# Patient Record
Sex: FEMALE | Race: BLACK OR AFRICAN AMERICAN | Employment: UNEMPLOYED | URBAN - METROPOLITAN AREA
[De-identification: names, ages, dates, MRNs, and addresses within clinical notes are randomized per-mention and may not be internally consistent; named-entity substitution may affect disease eponyms.]

---

## 2017-07-11 ENCOUNTER — HOSPITAL ENCOUNTER (OUTPATIENT)
Dept: MAMMOGRAPHY | Age: 67
Discharge: HOME | End: 2017-07-11
Attending: INTERNAL MEDICINE
Payer: MEDICARE

## 2017-07-11 DIAGNOSIS — Z12.31 VISIT FOR SCREENING MAMMOGRAM: ICD-10-CM

## 2017-07-11 PROCEDURE — 77067 SCR MAMMO BI INCL CAD: CPT

## 2018-07-12 ENCOUNTER — HOSPITAL ENCOUNTER (OUTPATIENT)
Dept: MAMMOGRAPHY | Age: 68
Discharge: HOME | End: 2018-07-12
Attending: INTERNAL MEDICINE
Payer: MEDICARE

## 2018-07-12 DIAGNOSIS — Z12.39 SCREENING BREAST EXAMINATION: ICD-10-CM

## 2018-07-12 PROCEDURE — 77067 SCR MAMMO BI INCL CAD: CPT

## 2018-09-21 ENCOUNTER — HOSPITAL ENCOUNTER (OUTPATIENT)
Dept: PREADMISSION TESTING | Age: 68
Discharge: HOME | End: 2018-09-21
Payer: MEDICARE

## 2018-09-21 ENCOUNTER — ANESTHESIA EVENT (OUTPATIENT)
Dept: SURGERY | Age: 68
End: 2018-09-21
Payer: MEDICARE

## 2018-09-21 VITALS
TEMPERATURE: 98.2 F | OXYGEN SATURATION: 100 % | RESPIRATION RATE: 16 BRPM | HEART RATE: 71 BPM | BODY MASS INDEX: 30.3 KG/M2 | DIASTOLIC BLOOD PRESSURE: 89 MMHG | HEIGHT: 57 IN | WEIGHT: 140.43 LBS | SYSTOLIC BLOOD PRESSURE: 127 MMHG

## 2018-09-21 LAB
ANION GAP SERPL CALC-SCNC: 7 MMOL/L (ref 5–15)
ATRIAL RATE: 62 BPM
BUN SERPL-MCNC: 26 MG/DL (ref 6–20)
BUN/CREAT SERPL: 17 (ref 12–20)
CALCIUM SERPL-MCNC: 9 MG/DL (ref 8.5–10.1)
CALCULATED P AXIS, ECG09: 42 DEGREES
CALCULATED R AXIS, ECG10: 18 DEGREES
CALCULATED T AXIS, ECG11: 38 DEGREES
CHLORIDE SERPL-SCNC: 102 MMOL/L (ref 97–108)
CO2 SERPL-SCNC: 30 MMOL/L (ref 21–32)
CREAT SERPL-MCNC: 1.49 MG/DL (ref 0.55–1.02)
DIAGNOSIS, 93000: NORMAL
ERYTHROCYTE [DISTWIDTH] IN BLOOD BY AUTOMATED COUNT: 12.8 % (ref 11.5–14.5)
GLUCOSE SERPL-MCNC: 99 MG/DL (ref 65–100)
HCT VFR BLD AUTO: 36.2 % (ref 35–47)
HGB BLD-MCNC: 11.4 G/DL (ref 11.5–16)
MCH RBC QN AUTO: 27.5 PG (ref 26–34)
MCHC RBC AUTO-ENTMCNC: 31.5 G/DL (ref 30–36.5)
MCV RBC AUTO: 87.4 FL (ref 80–99)
NRBC # BLD: 0 K/UL (ref 0–0.01)
NRBC BLD-RTO: 0 PER 100 WBC
P-R INTERVAL, ECG05: 124 MS
PLATELET # BLD AUTO: 310 K/UL (ref 150–400)
PMV BLD AUTO: 9.7 FL (ref 8.9–12.9)
POTASSIUM SERPL-SCNC: 4.4 MMOL/L (ref 3.5–5.1)
Q-T INTERVAL, ECG07: 422 MS
QRS DURATION, ECG06: 66 MS
QTC CALCULATION (BEZET), ECG08: 428 MS
RBC # BLD AUTO: 4.14 M/UL (ref 3.8–5.2)
SODIUM SERPL-SCNC: 139 MMOL/L (ref 136–145)
VENTRICULAR RATE, ECG03: 62 BPM
WBC # BLD AUTO: 6.6 K/UL (ref 3.6–11)

## 2018-09-21 PROCEDURE — 80048 BASIC METABOLIC PNL TOTAL CA: CPT | Performed by: PODIATRIST

## 2018-09-21 PROCEDURE — 36415 COLL VENOUS BLD VENIPUNCTURE: CPT | Performed by: PODIATRIST

## 2018-09-21 PROCEDURE — 93005 ELECTROCARDIOGRAM TRACING: CPT

## 2018-09-21 PROCEDURE — 85027 COMPLETE CBC AUTOMATED: CPT | Performed by: PODIATRIST

## 2018-09-21 RX ORDER — CEFAZOLIN SODIUM IN 0.9 % NACL 2 G/100 ML
2 PLASTIC BAG, INJECTION (ML) INTRAVENOUS
Status: CANCELLED | OUTPATIENT
Start: 2018-09-24 | End: 2018-09-25

## 2018-09-21 NOTE — PERIOP NOTES
T[address] 81  Preoperative Instructions      Surgery Date 09/24/2018               Time of Arrival  0830    1. On the day of your surgery, please report to the [facility]OSPITALS Entrance. If arriving prior to 7 AM please enter through the Emergency Room Entrance. 2. You must have a responsible adult to drive you to the hospital, stay at the hospital during your surgery and drive you home. You should have someone stay with you for the first 24 hours after your surgery. You should not drive a car for 24 hours following surgery. 3. Do not have anything to eat or drink ( including water, gum, mints, coffee, juice) after midnight . This may not apply to medications prescribed by your physician. Please note special instructions, if applicable. If you are currently taking Plavix, Coumadin, or other blood-thinning agents, contact your surgeon for instructions. 4. We recommend you do not drink any alcoholic beverages for 24 hours before and after your surgery. 5. Have a list of all current medications, including vitamins, herbal supplements and any other over the counter medications. Stop Asprin and non-steroidal anti-inflammatory drugs (I.e. Advil, Aleve) as directed by your surgeon's office. Stop all vitamins and herbal supplements seven days prior to your surgery. 6. Wear comfortable clothes. Wear glasses instead of contacts. Do not bring any money or jewelry. Do not wear make-up, particularly mascara, the morning of your surgery. Do not wear nail polish, particularly if you are having foot /hand surgery. Wear your hair loose or down, no ponytails, buns, jae pins or clips. All body piercings must be removed. Please shower before surgery with an antibacterial soap (Safeguard/Dial) and use a clean towel. Do not apply any lotions, powders, cologne, perfume or deodorants afterward.     Do not shave the area around your surgical incision for at least two to three days prior to your surgery. If you wear glasses, contacts, dentures and/or hearing aids, they will be removed prior to surgery. 7. You should understand that if you do not follow these instructions your surgery may be cancelled. If your physical condition changes (I.e. fever, cold or flu) please contact your surgeon as soon as possible. 8. It is important that you be on time. If a situation occurs where you may be late, please call [phone]    9. If you are feeling sick before surgery, call your surgeon. He or she will tell you what to do. If you are sick on the day of your surgery please call [phone].     10. If you have any questions and or problems, please call [phone] (Pre-admission Testing). 11. Your surgery time may be subject to change. You will receive a phone call the evening before your surgery if your time changes. Special Instructions: Bathe with antibacterial soap Sunday night and Monday morning    MEDICATIONS TO TAKE THE MORNING OF SURGERY WITH A SIP OF WATER: Amlodipine     I understand a pre-operative phone call will be made to verify my surgery time. In the event that I am not available, I give permission for a message to be left on my answering service and/or with another person?    yes           ___________________      ___________________  _________  (Signature of Patient)          (Witness)                              (Date and Time)

## 2018-09-21 NOTE — PROGRESS NOTES
ekg completed after instruction and transmitted, pt escorted to lobby for labs.  Call placed to Paul Ackerman to note pt needs labs hard copy  ekg to OR Nurse HARSH

## 2018-09-24 ENCOUNTER — HOSPITAL ENCOUNTER (OUTPATIENT)
Age: 68
Setting detail: OUTPATIENT SURGERY
Discharge: HOME | End: 2018-09-24
Attending: PODIATRIST | Admitting: PODIATRIST
Payer: MEDICARE

## 2018-09-24 ENCOUNTER — ANESTHESIA (OUTPATIENT)
Dept: SURGERY | Age: 68
End: 2018-09-24
Payer: MEDICARE

## 2018-09-24 VITALS
TEMPERATURE: 98.1 F | HEART RATE: 67 BPM | WEIGHT: 140.43 LBS | BODY MASS INDEX: 30.3 KG/M2 | DIASTOLIC BLOOD PRESSURE: 74 MMHG | RESPIRATION RATE: 15 BRPM | OXYGEN SATURATION: 99 % | HEIGHT: 57 IN | SYSTOLIC BLOOD PRESSURE: 125 MMHG

## 2018-09-24 DIAGNOSIS — M20.11 HALLUX VALGUS (ACQUIRED), RIGHT FOOT: Primary | ICD-10-CM

## 2018-09-24 PROCEDURE — 76060000033 HC ANESTHESIA 1 TO 1.5 HR: Performed by: PODIATRIST

## 2018-09-24 PROCEDURE — 88311 DECALCIFY TISSUE: CPT | Performed by: PODIATRIST

## 2018-09-24 PROCEDURE — 74011250636 HC RX REV CODE- 250/636: Performed by: ANESTHESIOLOGY

## 2018-09-24 PROCEDURE — 74011250636 HC RX REV CODE- 250/636

## 2018-09-24 PROCEDURE — 77030002933 HC SUT MCRYL J&J -A: Performed by: PODIATRIST

## 2018-09-24 PROCEDURE — 77030013921: Performed by: PODIATRIST

## 2018-09-24 PROCEDURE — 77030018836 HC SOL IRR NACL ICUM -A: Performed by: PODIATRIST

## 2018-09-24 PROCEDURE — 77030000032 HC CUF TRNQT ZIMM -B: Performed by: PODIATRIST

## 2018-09-24 PROCEDURE — 74011000250 HC RX REV CODE- 250: Performed by: PODIATRIST

## 2018-09-24 PROCEDURE — 76210000063 HC OR PH I REC FIRST 0.5 HR: Performed by: PODIATRIST

## 2018-09-24 PROCEDURE — 88304 TISSUE EXAM BY PATHOLOGIST: CPT | Performed by: PODIATRIST

## 2018-09-24 PROCEDURE — 76010000149 HC OR TIME 1 TO 1.5 HR: Performed by: PODIATRIST

## 2018-09-24 PROCEDURE — 77030031139 HC SUT VCRL2 J&J -A: Performed by: PODIATRIST

## 2018-09-24 PROCEDURE — 74011000250 HC RX REV CODE- 250

## 2018-09-24 PROCEDURE — 77030006788 HC BLD SAW OSC STRY -B: Performed by: PODIATRIST

## 2018-09-24 PROCEDURE — 77030011640 HC PAD GRND REM COVD -A: Performed by: PODIATRIST

## 2018-09-24 PROCEDURE — 74011250636 HC RX REV CODE- 250/636: Performed by: PODIATRIST

## 2018-09-24 PROCEDURE — 76210000020 HC REC RM PH II FIRST 0.5 HR: Performed by: PODIATRIST

## 2018-09-24 RX ORDER — LIDOCAINE HYDROCHLORIDE 10 MG/ML
20 INJECTION, SOLUTION EPIDURAL; INFILTRATION; INTRACAUDAL; PERINEURAL ONCE
Status: COMPLETED | OUTPATIENT
Start: 2018-09-24 | End: 2018-09-24

## 2018-09-24 RX ORDER — SODIUM CHLORIDE 0.9 % (FLUSH) 0.9 %
5-10 SYRINGE (ML) INJECTION AS NEEDED
Status: DISCONTINUED | OUTPATIENT
Start: 2018-09-24 | End: 2018-09-24 | Stop reason: HOSPADM

## 2018-09-24 RX ORDER — PROPOFOL 10 MG/ML
INJECTION, EMULSION INTRAVENOUS
Status: DISCONTINUED | OUTPATIENT
Start: 2018-09-24 | End: 2018-09-24 | Stop reason: HOSPADM

## 2018-09-24 RX ORDER — BUPIVACAINE HYDROCHLORIDE 5 MG/ML
30 INJECTION, SOLUTION EPIDURAL; INTRACAUDAL ONCE
Status: COMPLETED | OUTPATIENT
Start: 2018-09-24 | End: 2018-09-24

## 2018-09-24 RX ORDER — SODIUM CHLORIDE, SODIUM LACTATE, POTASSIUM CHLORIDE, CALCIUM CHLORIDE 600; 310; 30; 20 MG/100ML; MG/100ML; MG/100ML; MG/100ML
50 INJECTION, SOLUTION INTRAVENOUS CONTINUOUS
Status: DISCONTINUED | OUTPATIENT
Start: 2018-09-24 | End: 2018-09-24 | Stop reason: HOSPADM

## 2018-09-24 RX ORDER — IBUPROFEN 200 MG
600 TABLET ORAL
Qty: 90 TAB | Refills: 1 | Status: SHIPPED | OUTPATIENT
Start: 2018-09-24 | End: 2020-12-23 | Stop reason: SDUPTHER

## 2018-09-24 RX ORDER — FENTANYL CITRATE 50 UG/ML
25 INJECTION, SOLUTION INTRAMUSCULAR; INTRAVENOUS
Status: DISCONTINUED | OUTPATIENT
Start: 2018-09-24 | End: 2018-09-24 | Stop reason: HOSPADM

## 2018-09-24 RX ORDER — HYDROMORPHONE HYDROCHLORIDE 1 MG/ML
0.5 INJECTION, SOLUTION INTRAMUSCULAR; INTRAVENOUS; SUBCUTANEOUS
Status: DISCONTINUED | OUTPATIENT
Start: 2018-09-24 | End: 2018-09-24 | Stop reason: HOSPADM

## 2018-09-24 RX ORDER — FENTANYL CITRATE 50 UG/ML
INJECTION, SOLUTION INTRAMUSCULAR; INTRAVENOUS AS NEEDED
Status: DISCONTINUED | OUTPATIENT
Start: 2018-09-24 | End: 2018-09-24 | Stop reason: HOSPADM

## 2018-09-24 RX ORDER — MIDAZOLAM HYDROCHLORIDE 1 MG/ML
INJECTION, SOLUTION INTRAMUSCULAR; INTRAVENOUS AS NEEDED
Status: DISCONTINUED | OUTPATIENT
Start: 2018-09-24 | End: 2018-09-24 | Stop reason: HOSPADM

## 2018-09-24 RX ORDER — CEFAZOLIN SODIUM IN 0.9 % NACL 2 G/100 ML
PLASTIC BAG, INJECTION (ML) INTRAVENOUS
Status: COMPLETED
Start: 2018-09-24 | End: 2018-09-24

## 2018-09-24 RX ORDER — SODIUM CHLORIDE 0.9 % (FLUSH) 0.9 %
5-10 SYRINGE (ML) INJECTION EVERY 8 HOURS
Status: DISCONTINUED | OUTPATIENT
Start: 2018-09-24 | End: 2018-09-24 | Stop reason: HOSPADM

## 2018-09-24 RX ORDER — SODIUM CHLORIDE 9 MG/ML
50 INJECTION, SOLUTION INTRAVENOUS CONTINUOUS
Status: DISCONTINUED | OUTPATIENT
Start: 2018-09-24 | End: 2018-09-24 | Stop reason: HOSPADM

## 2018-09-24 RX ORDER — OXYCODONE AND ACETAMINOPHEN 5; 325 MG/1; MG/1
1 TABLET ORAL
Qty: 30 TAB | Refills: 0 | Status: SHIPPED | OUTPATIENT
Start: 2018-09-24 | End: 2020-12-23 | Stop reason: ALTCHOICE

## 2018-09-24 RX ORDER — LIDOCAINE HYDROCHLORIDE 10 MG/ML
0.1 INJECTION, SOLUTION EPIDURAL; INFILTRATION; INTRACAUDAL; PERINEURAL AS NEEDED
Status: DISCONTINUED | OUTPATIENT
Start: 2018-09-24 | End: 2018-09-24 | Stop reason: HOSPADM

## 2018-09-24 RX ORDER — LIDOCAINE HYDROCHLORIDE 10 MG/ML
INJECTION INFILTRATION; PERINEURAL
Status: DISCONTINUED
Start: 2018-09-24 | End: 2018-09-24 | Stop reason: HOSPADM

## 2018-09-24 RX ORDER — EPHEDRINE SULFATE 50 MG/ML
INJECTION, SOLUTION INTRAVENOUS AS NEEDED
Status: DISCONTINUED | OUTPATIENT
Start: 2018-09-24 | End: 2018-09-24 | Stop reason: HOSPADM

## 2018-09-24 RX ORDER — CEFAZOLIN SODIUM IN 0.9 % NACL 2 G/100 ML
PLASTIC BAG, INJECTION (ML) INTRAVENOUS AS NEEDED
Status: DISCONTINUED | OUTPATIENT
Start: 2018-09-24 | End: 2018-09-24 | Stop reason: HOSPADM

## 2018-09-24 RX ADMIN — MIDAZOLAM HYDROCHLORIDE 2 MG: 1 INJECTION, SOLUTION INTRAMUSCULAR; INTRAVENOUS at 11:52

## 2018-09-24 RX ADMIN — FENTANYL CITRATE 50 MCG: 50 INJECTION, SOLUTION INTRAMUSCULAR; INTRAVENOUS at 11:57

## 2018-09-24 RX ADMIN — FENTANYL CITRATE 25 MCG: 50 INJECTION, SOLUTION INTRAMUSCULAR; INTRAVENOUS at 11:52

## 2018-09-24 RX ADMIN — PROPOFOL 50 MCG/KG/MIN: 10 INJECTION, EMULSION INTRAVENOUS at 11:57

## 2018-09-24 RX ADMIN — Medication 2 G: at 11:55

## 2018-09-24 RX ADMIN — FENTANYL CITRATE 25 MCG: 50 INJECTION, SOLUTION INTRAMUSCULAR; INTRAVENOUS at 11:48

## 2018-09-24 RX ADMIN — EPHEDRINE SULFATE 10 MG: 50 INJECTION, SOLUTION INTRAVENOUS at 12:03

## 2018-09-24 RX ADMIN — SODIUM CHLORIDE, POTASSIUM CHLORIDE, SODIUM LACTATE AND CALCIUM CHLORIDE: 600; 310; 30; 20 INJECTION, SOLUTION INTRAVENOUS at 09:22

## 2018-09-24 NOTE — H&P
History and Physical    Subjective:     Zechariah Arias is a 69 y.o.  female who presents with painful right bunion of several months. . Onset of symptoms was gradual with gradually worsening course since that time. The pain is located 1st right bunion. Patient describes the pain as continuous and rated as moderate. Pain has been associated with walking and wearing shoes. Patient denies any type of truama. Symptoms are aggravated by shoes and walking. Previous studies include xray. Past Medical History:   Diagnosis Date    Arthritis     GERD (gastroesophageal reflux disease)     Hypertension       Past Surgical History:   Procedure Laterality Date    ABDOMEN SURGERY PROC UNLISTED      gallbladder    HX BREAST BIOPSY Left     Stereo Bx Yrs ago - BENIGN    HX OTHER SURGICAL Left     bunionectomy     History reviewed. No pertinent family history. Social History   Substance Use Topics    Smoking status: Former Smoker    Smokeless tobacco: Never Used    Alcohol use No       Prior to Admission medications    Medication Sig Start Date End Date Taking? Authorizing Provider   ibuprofen (MOTRIN) 200 mg tablet Take 3 Tabs by mouth two (2) times a day. 8/1/16  Yes Raylene Cheadle, DPM   amLODIPine (NORVASC) 10 mg tablet Take 10 mg by mouth daily. Yes Historical Provider   lisinopril (PRINIVIL, ZESTRIL) 20 mg tablet Take 20 mg by mouth daily. Yes Historical Provider   potassium citrate (UROCIT-K10) 10 mEq (1,080 mg) TbER Take  by mouth. Historical Provider     No Known Allergies     Review of Systems:  A comprehensive review of systems was negative. Objective:      Intake and Output:            Physical Exam:   Visit Vitals    /76 (BP 1 Location: Left arm, BP Patient Position: At rest;Supine)    Pulse 71    Temp 98.6 °F (37 °C)    Resp 16    Ht 4' 9\" (1.448 m)    Wt 63.7 kg (140 lb 6.9 oz)    SpO2 99%    BMI 30.39 kg/m2     General:  Alert, cooperative, no distress, appears stated age. Head:  Normocephalic, without obvious abnormality, atraumatic. Eyes:  Conjunctivae/corneas clear. PERRL,    Ears:  Normal  external ear canals both ears. Nose: Nares normal. Septum midline. Mucosa normal. No drainage or sinus tenderness. Throat: Lips, mucosa, and tongue normal. Teeth and gums normal.   Neck: Supple, symmetrical, trachea midline, no adenopathy, thyroid: no enlargement/tenderness/nodules,    Back:   Symmetric, no curvature. ROM normal.    Lungs:   Clear to auscultation bilaterally. Chest wall:  No tenderness or deformity. Heart:  Regular rate and rhythm, S1, S2 normal, no murmur, click, rub or gallop. Extremities: Extremities normal, atraumatic, no cyanosis or edema. Pulses: 2+ and symmetric all extremities. Skin: Skin color, texture, turgor normal. No rashes or lesions. Lymph nodes: Cervical, supraclavicular, and axillary nodes normal.   Neurologic: CNII-XII intact. Normal strength, sensation and reflexes throughout. LOWER EXTREMITIES:  Palpable pedal pulses with epicritic sensation intact  Good muscle strength   Pain with ROM and medially 1st right MPJ    XRAY:  Minimal joint space narrowing 1st left MPJ; exostosis dorsal 1st met head  1st IM angle 12 degrees       Data Review:   No results found for this or any previous visit (from the past 24 hour(s)). Assessment:     Active Problems:    Hallux valgus (acquired), right foot (9/24/2018)        Plan:   Scheduled for bunionectomy  with possible implant 1st right MPJ. Discuss risks, benefits, expected outcome as well as post op course.       Signed By: Corrine Irby DPM     September 24, 2018

## 2018-09-24 NOTE — BRIEF OP NOTE
BRIEF OPERATIVE NOTE    Date of Procedure: 9/24/2018   Preoperative Diagnosis: HALLUX RIGIDUS RIGHT FOOT  Postoperative Diagnosis: HALLUX RIGIDUS RIGHT FOOT    Procedure(s):  Silver BUNIONECTOMY RIGHT FOOT  Surgeon(s) and Role:     * Lili Quintero DPM - Primary         Surgical Assistant: none    Surgical Staff:  [facility]  Scrub Tech-1: Cristal Edouard  Event Time In   Incision Start 1205   Incision Close 1249     Anesthesia: MAC   Estimated Blood Loss: none  Specimens:   ID Type Source Tests Collected by Time Destination   1 : Bone Right Foot Preservative Bone  Lili Quintero DPM 9/24/2018 1238 Pathology      Findings: arthritic bone  Complications: none  Implants: * No implants in log *

## 2018-09-24 NOTE — IP AVS SNAPSHOT
Mariia Buciod 
 
 
 [address] Patient: Zana Barboza MRN: [id number] :1950 About your hospitalization You were admitted on:  2018 You last received care in the:  [facility] PACU/[facility] You were discharged on:  2018 Why you were hospitalized Your primary diagnosis was:  Not on File Your diagnoses also included:  Hallux Valgus (Acquired), Right Foot Follow-up Information Follow up With Details Comments Contact Info MD [address] 
 
[facility] [phone] 
[phone] Discharge Orders Procedure Order Date Status Priority Quantity Spec Type Associated Dx SURGICAL BOOT 18 Normal Routine 1  Hallux valgus (acquired), right foot [[id number]] Comments:  Dispense surgical shoe right  foot WEIGHT BEARING: SPECIFY 18 Normal Routine 1  Hallux valgus (acquired), right foot [[id number]] Comments:  Do not walk, stand or bear weight without surgical shoe on right. DO NOT CHANGE OR GET DRESSING WET! A check heather indicates which time of day the medication should be taken. My Medications START taking these medications Instructions Each Dose to Equal  
 Morning Noon Evening Bedtime  
 oxyCODONE-acetaminophen 5-325 mg per tablet Commonly known as:  PERCOCET Your last dose was: Your next dose is: Take 1 Tab by mouth every four (4) hours as needed for Pain. Max Daily Amount: 6 Tabs. 1 Tab CHANGE how you take these medications Instructions Each Dose to Equal  
 Morning Noon Evening Bedtime * ibuprofen 200 mg tablet Commonly known as:  MOTRIN What changed:  Another medication with the same name was added. Make sure you understand how and when to take each. Your last dose was: Your next dose is: Take 3 Tabs by mouth two (2) times a day. 600 mg  
    
   
   
   
  
 * ibuprofen 200 mg tablet Commonly known as:  MOTRIN What changed: You were already taking a medication with the same name, and this prescription was added. Make sure you understand how and when to take each. Your last dose was: Your next dose is: Take 3 Tabs by mouth three (3) times daily as needed for Pain. 600 mg * Notice: This list has 2 medication(s) that are the same as other medications prescribed for you. Read the directions carefully, and ask your doctor or other care provider to review them with you. CONTINUE taking these medications Instructions Each Dose to Equal  
 Morning Noon Evening Bedtime  
 amLODIPine 10 mg tablet Commonly known as:  Dione Fraction Your last dose was: Your next dose is: Take 10 mg by mouth daily. 10 mg  
    
   
   
   
  
 lisinopril 20 mg tablet Commonly known as:  Larson Argyle Your last dose was: Your next dose is: Take 20 mg by mouth daily. 20 mg  
    
   
   
   
  
 potassium citrate 10 mEq (1,080 mg) Slade Arroyo Commonly known as:  [place] Your last dose was: Your next dose is: Take  by mouth. Where to Get Your Medications Information on where to get these meds will be given to you by the nurse or doctor. ! Ask your nurse or doctor about these medications  
  ibuprofen 200 mg tablet  
 oxyCODONE-acetaminophen 5-325 mg per tablet Opioid Education Prescription Opioids: What You Need to Know: 
 
Prescription opioids can be used to help relieve moderate-to-severe pain and are often prescribed following a surgery or injury, or for certain health conditions. These medications can be an important part of treatment but also come with serious risks.   Opioids are strong pain medicines. Examples include hydrocodone, oxycodone, fentanyl, and morphine. Heroin is an example of an illegal opioid. It is important to work with your health care provider to make sure you are getting the safest, most effective care. WHAT ARE THE RISKS AND SIDE EFFECTS OF OPIOID USE? Prescription opioids carry serious risks of addiction and overdose, especially with prolonged use. An opioid overdose, often marked by slow breathing, can cause sudden death. The use of prescription opioids can have a number of side effects as well, even when taken as directed. · Tolerance-meaning you might need to take more of a medication for the same pain relief · Physical dependence-meaning you have symptoms of withdrawal when the medication is stopped. Withdrawal symptoms can include nausea, sweating, chills, diarrhea, stomach cramps, and muscle aches. Withdrawal can last up to several weeks, depending on which drug you took and how long you took it. · Increased sensitivity to pain · Constipation · Nausea, vomiting, and dry mouth · Sleepiness and dizziness · Confusion · Depression · Low levels of testosterone that can result in lower sex drive, energy, and strength · Itching and sweating RISKS ARE GREATER WITH:      
· History of drug misuse, substance use disorder, or overdose · Mental health conditions (such as depression or anxiety) · Sleep apnea · Older age (72 years or older) · Pregnancy Avoid alcohol while taking prescription opioids. Also, unless specifically advised by your health care provider, medications to avoid include: · Benzodiazepines (such as Xanax or Valium) · Muscle relaxants (such as Soma or Flexeril) · Hypnotics (such as Ambien or Lunesta) · Other prescription opioids KNOW YOUR OPTIONS Talk to your health care provider about ways to manage your pain that don't involve prescription opioids.   Some of these options may actually work better and have fewer risks and side effects. Consult your physician before adding or stopping any medications, treatments, or physical activity. Options may include: 
· Pain relievers such as acetaminophen, ibuprofen, and naproxen · Some medications that are also used for depression or seizures · Physical therapy and exercise · Counseling to help patients learn how to cope better with triggers of pain and stress. · Application of heat or cold compress · Massage therapy · Relaxation techniques Be Informed Make sure you know the name of your medication, how much and how often to take it, and its potential risks & side effects. IF YOU ARE PRESCRIBED OPIOIDS FOR PAIN: 
· Never take opioids in greater amounts or more often than prescribed. Remember the goal is not to be pain-free but to manage your pain at a tolerable level. · Follow up with your primary care provider to: · Work together to create a plan on how to manage your pain. · Talk about ways to help manage your pain that don't involve prescription opioids. · Talk about any and all concerns and side effects. · Help prevent misuse and abuse. · Never sell or share prescription opioids · Help prevent misuse and abuse. · Store prescription opioids in a secure place and out of reach of others (this may include visitors, children, friends, and family). · Safely dispose of unused/unwanted prescription opioids: Find your community drug take-back program or your pharmacy mail-back program, or flush them down the toilet, following guidance from the Food and Drug Administration (www.fda.gov/Drugs/ResourcesForYou). · Visit www.cdc.gov/drugoverdose to learn about the risks of opioid abuse and overdose. · If you believe you may be struggling with addiction, tell your health care provider and ask for guidance or call [address] [place], [place] [address] at 1-884-204-ZUVU. Discharge Instructions Narcotic-Analgesic/Acetaminophen (Percocet, Norco, Lorcet HD, Lortab 10/325) - (By mouth) Why this medicine is used:  
Relieves pain. Contact a nurse or doctor right away if you have: 
· Extreme weakness, shallow breathing, slow heartbeat · Severe confusion, lightheadedness, dizziness, fainting · Yellow skin or eyes, dark urine or pale stools · Severe constipation, severe stomach pain, nausea, vomiting, loss of appetite · Sweating or cold, clammy skin Common side effects: · Mild constipation, nausea, vomiting · Sleepiness, tiredness · Itching, rash © 2017 [address]  Information is for End User's use only and may not be sold, redistributed or otherwise used for commercial purposes. DISCHARGE SUMMARY from Nurse PATIENT INSTRUCTIONS: 
 
 
F-face looks uneven A-arms unable to move or move unevenly S-speech slurred or non-existent T-time-call 911 as soon as signs and symptoms begin-DO NOT go Back to bed or wait to see if you get better-TIME IS BRAIN. Warning Signs of HEART ATTACK Call 911 if you have these symptoms: 
? Chest discomfort. Most heart attacks involve discomfort in the center of the chest that lasts more than a few minutes, or that goes away and comes back. It can feel like uncomfortable pressure, squeezing, fullness, or pain. ? Discomfort in other areas of the upper body. Symptoms can include pain or discomfort in one or both arms, the back, neck, jaw, or stomach. ? Shortness of breath with or without chest discomfort. ? Other signs may include breaking out in a cold sweat, nausea, or lightheadedness. Don't wait more than five minutes to call [address]! Fast action can save your life. Calling 911 is almost always the fastest way to get lifesaving treatment.  Emergency Medical Services staff can begin treatment when they arrive  up to an hour sooner than if someone gets to the hospital by car. The discharge information has been reviewed with the patient and daughter. The patient and daughter verbalized understanding. Discharge medications reviewed with the patient and daughter and appropriate educational materials and side effects teaching were provided. ___________________________________________________________________________________________________________________________________ Introducing [facility] & HEALTH SERVICES! [facility] introduces Toldo patient portal. Now you can access parts of your medical record, email your doctor's office, and request medication refills online. 1. In your internet browser, go to [email or web address] 2. Click on the First Time User? Click Here link in the Sign In box. You will see the New Member Sign Up page. 3. Enter your Toldo Access Code exactly as it appears below. You will not need to use this code after youve completed the sign-up process. If you do not sign up before the expiration date, you must request a new code. · Toldo Access Code: [email or web address] Expires: 12/20/2018 11:53 AM 
 
4. Enter the last four digits of your Social Security Number (xxxx) and Date of Birth (mm/dd/yyyy) as indicated and click Submit. You will be taken to the next sign-up page. 5. Create a Tabbert ID. This will be your Toldo login ID and cannot be changed, so think of one that is secure and easy to remember. 6. Create a Toldo password. You can change your password at any time. 7. Enter your Password Reset Question and Answer. This can be used at a later time if you forget your password. 8. Enter your e-mail address. You will receive e-mail notification when new information is available in [address] 9. Click Sign Up. You can now view and download portions of your medical record. 10. Click the Download Summary menu link to download a portable copy of your medical information. If you have questions, please visit the Frequently Asked Questions section of the [employer] website. Remember, [employer] is NOT to be used for urgent needs. For medical emergencies, dial 911. Now available from your iPhone and Android! Introducing Tye Cash As a [employer] patient, I wanted to make you aware of our electronic visit tool called Tye Cash. [employer] 24/7 allows you to connect within minutes with a medical provider 24 hours a day, seven days a week via a mobile device or tablet or logging into a secure website from your computer. You can access Tye Cash from anywhere in the [place]. A virtual visit might be right for you when you have a simple condition and feel like you just dont want to get out of bed, or cant get away from work for an appointment, when your regular [employer] provider is not available (evenings, weekends or holidays), or when youre out of town and need minor care. Electronic visits cost only $49 and if the [employer] 24/7 provider determines a prescription is needed to treat your condition, one can be electronically transmitted to a nearby pharmacy*. Please take a moment to enroll today if you have not already done so. The enrollment process is free and takes just a few minutes. To enroll, please download the [employer] 24/7 vera to your tablet or phone, or visit [email or web address] to enroll on your computer. And, as an [address] [place] [address] patient with a [employer] account, the results of your visits will be scanned into your electronic medical record and your primary care provider will be able to view the scanned results. We urge you to continue to see your regular [employer] provider for your ongoing medical care.   And while your primary care provider may not be the one available when you seek a Tye Cash virtual visit, the peace of mind you get from getting a real diagnosis real time can be priceless. For more information on Tye Cash, view our Frequently Asked Questions (FAQs) at [email or web address]. Sincerely, 
 
Jannie Rivera MD 
Chief Medical Officer [employer] *:  certain medications cannot be prescribed via Tye Cash Providers Seen During Your Hospitalization Provider Specialty Primary office phone Nolberto Smith, [address] [facility] [phone] Your Primary Care Physician (PCP) Primary Care Physician Office Phone Office Fax Michelle Tanner [phone] You are allergic to the following No active allergies Recent Documentation Height Weight BMI OB Status Smoking Status 1.448 m 63.7 kg 30.39 kg/m2 Postmenopausal Former Smoker Emergency Contacts Name Discharge Info Relation Home Work Mobile Jayna Leung DISCHARGE CAREGIVER [3] Other Relative [6] [phone] Patient Belongings The following personal items are in your possession at time of discharge: 
  Dental Appliances: Lowers, Uppers  Visual Aid: Glasses      Home Medications: None   Jewelry: Necklace, Earrings  Clothing: Other (comment) (street clothes)    Other Valuables:  (purse, cell phone and wallet left with daughter in waiting room) Please provide this summary of care documentation to your next provider. Signatures-by signing, you are acknowledging that this After Visit Summary has been reviewed with you and you have received a copy. Patient Signature:  ____________________________________________________________ Date:  ____________________________________________________________  
  
Philippe Capellan Provider Signature:  ____________________________________________________________ Date:  ____________________________________________________________

## 2018-09-24 NOTE — ANESTHESIA PREPROCEDURE EVALUATION
Anesthetic History   No history of anesthetic complications            Review of Systems / Medical History  Patient summary reviewed and pertinent labs reviewed    Pulmonary                   Neuro/Psych   Within defined limits           Cardiovascular    Hypertension              Exercise tolerance: <4 METS     GI/Hepatic/Renal     GERD           Endo/Other        Arthritis     Other Findings              Physical Exam    Airway  Mallampati: II  TM Distance: 4 - 6 cm  Neck ROM: normal range of motion   Mouth opening: Normal     Cardiovascular    Rhythm: regular  Rate: normal         Dental    Dentition: Lower partial plate and Upper partial plate     Pulmonary  Breath sounds clear to auscultation               Abdominal  GI exam deferred       Other Findings            Anesthetic Plan    ASA: 2  Anesthesia type: MAC            Anesthetic plan and risks discussed with: Patient

## 2018-09-24 NOTE — DISCHARGE INSTRUCTIONS
Narcotic-Analgesic/Acetaminophen (Percocet, Norco, Lorcet HD, Lortab 10/325) - (By mouth)   Why this medicine is used:   Relieves pain. Contact a nurse or doctor right away if you have:  · Extreme weakness, shallow breathing, slow heartbeat  · Severe confusion, lightheadedness, dizziness, fainting  · Yellow skin or eyes, dark urine or pale stools  · Severe constipation, severe stomach pain, nausea, vomiting, loss of appetite  · Sweating or cold, clammy skin     Common side effects:  · Mild constipation, nausea, vomiting  · Sleepiness, tiredness  · Itching, rash  © 2017 [facility] Information is for End User's use only and may not be sold, redistributed or otherwise used for commercial purposes. DISCHARGE SUMMARY from Nurse    PATIENT INSTRUCTIONS:    After general anesthesia or intravenous sedation, for 24 hours or while taking prescription Narcotics:  · Limit your activities  · Do not drive and operate hazardous machinery  · Do not make important personal or business decisions  · Do  not drink alcoholic beverages  · If you have not urinated within 8 hours after discharge, please contact your surgeon on call. Report the following to your surgeon:  · Excessive pain, swelling, redness or odor of or around the surgical area  · Temperature over 100.5  · Nausea and vomiting lasting longer than 4 hours or if unable to take medications  · Any signs of decreased circulation or nerve impairment to extremity: change in color, persistent  numbness, tingling, coldness or increase pain  · Any questions      *  Please give a list of your current medications to your Primary Care Provider. *  Please update this list whenever your medications are discontinued, doses are      changed, or new medications (including over-the-counter products) are added. *  Please carry medication information at all times in case of emergency situations.     These are general instructions for a healthy lifestyle:    No smoking/ No tobacco products/ Avoid exposure to second hand smoke  Surgeon General's Warning:  Quitting smoking now greatly reduces serious risk to your health. Obesity, smoking, and sedentary lifestyle greatly increases your risk for illness    A healthy diet, regular physical exercise & weight monitoring are important for maintaining a healthy lifestyle    You may be retaining fluid if you have a history of heart failure or if you experience any of the following symptoms:  Weight gain of 3 pounds or more overnight or 5 pounds in a week, increased swelling in our hands or feet or shortness of breath while lying flat in bed. Please call your doctor as soon as you notice any of these symptoms; do not wait until your next office visit. Recognize signs and symptoms of STROKE:    F-face looks uneven    A-arms unable to move or move unevenly    S-speech slurred or non-existent    T-time-call 911 as soon as signs and symptoms begin-DO NOT go       Back to bed or wait to see if you get better-TIME IS BRAIN. Warning Signs of HEART ATTACK     Call 911 if you have these symptoms:   Chest discomfort. Most heart attacks involve discomfort in the center of the chest that lasts more than a few minutes, or that goes away and comes back. It can feel like uncomfortable pressure, squeezing, fullness, or pain.  Discomfort in other areas of the upper body. Symptoms can include pain or discomfort in one or both arms, the back, neck, jaw, or stomach.  Shortness of breath with or without chest discomfort.  Other signs may include breaking out in a cold sweat, nausea, or lightheadedness. Don't wait more than five minutes to call 911 - MINUTES MATTER! Fast action can save your life. Calling 911 is almost always the fastest way to get lifesaving treatment. Emergency Medical Services staff can begin treatment when they arrive -- up to an hour sooner than if someone gets to the hospital by car.      The discharge information has been reviewed with the patient and daughter. The patient and daughter verbalized understanding. Discharge medications reviewed with the patient and daughter and appropriate educational materials and side effects teaching were provided.   ___________________________________________________________________________________________________________________________________

## 2018-09-24 NOTE — PERIOP NOTES
Handoff Report from Operating Room to PACU    Report received from JUAN JOSE Nichols RN and Dr. Juanita Taylor regarding Yuliana Members. Surgeon(s):  Ashley Mahoney DPM  And Procedure(s) (LRB):  BUNIONECTOMY RIGHT FOOT (Right)  confirmed   with allergies and dressings discussed. Anesthesia type, drugs, patient history, complications, estimated blood loss, vital signs, intake and output, and last pain medication, lines and temperature were reviewed.

## 2018-09-24 NOTE — OP NOTES
[facility]  OPERATIVE REPORT    Roldan Borrego  MR#: [id number]  : 1950  ACCOUNT #: [de-identified]   DATE OF SERVICE: 2018    PREOPERATIVE DIAGNOSIS:  Hallux rigidus, right foot. POSTOPERATIVE DIAGNOSIS:  Hallux rigidus, right foot. PROCEDURE:  Silver bunionectomy, right foot. ANESTHESIA:  IV sedation with local.    ESTIMATED BLOOD LOSS:  Minimal.    COMPLICATIONS:  none    SPECIMENS REMOVED:  Bone. IMPLANTS:  none    SURGEON:  Shamika Alfaro DPM     ASSISTANT:  none    INDICATIONS:  This 58-year-old black female presents with painful right bunion for several months. Conservative therapy has failed to alleviate the patient of her symptoms. At this time,  surgical intervention is the treatment of choice by the patient. Medical history and physical has been performed. Patient is released for surgery. Physical examination revealed palpable pedal pulses with fairly good muscle strength in lower extremities bilateral.  Epicritic sensation intact. There is pain medially and in range of motion first right hallux. X-rays taken reveal fairly good joint space, first right MPJ; however exostosis dorsal first metatarsal head and the perimeter of the metatarsal head has osteophyte formation. DESCRIPTION OF PROCEDURE:  The patient was brought to the operating room and placed on the operating table in supine position. Under the influence of IV sedation, anesthesia was achieved to the first right digit using 2% Xylocaine plain. Right foot was now prepped and draped in usual sterile manner. A successful timeout was completed. The right foot was exsanguinated with an Esmarch bandage, elevated above the operating table and after approximately 30 seconds,  pneumatic ankle tourniquet was inflated to 250 mmHg. Esmarch was released from the foot and legs were brought down to the table.   Attention was now directed to the dorsal medial aspect of the 1st right digit where a linear incision was placed centered over the MPJ. The incision was deepened using sharp and blunt dissection, being careful to identify and retract vital structures. Capsular incision was made medially and the medial eminence was resected. The dorsal exostosis was resected as well as all osteophyte formation around the perimeter of the metatarsal head. There was also significant fragmented osteophyte formation around the base of the proximal phalanx. Cartilaginous base of the phalanx and of the head of the metatarsal was intact. A lateral release was performed. The wound was flushed with copious amounts of sterile saline and freed of all debris. All capsular and superficial fascia were reapproximated using 4-0 Vicryl in a running stitch manner and all skin edges were approximated using 5-0 Monocryl in a subcuticular manner. At this time,  the digit appeared to be in proper anatomical alignment with adequate reduction of the deformity and with good range of motion without crepitus. The patient appeared to tolerate anesthesia and procedure well and was transferred from the operating room to recovery with all vital signs stable and vascular status intact to all digits 1-5, right foot. Preoperatively, she was given 2 grams Ancef IV.       WINTER Acuna  D: 09/24/2018 13:38     T: 09/24/2018 19:16  JOB #: [id number]

## 2019-02-26 NOTE — ANESTHESIA POSTPROCEDURE EVALUATION
Procedure(s):  BUNIONECTOMY RIGHT FOOT.     Anesthesia Post Evaluation        Patient location during evaluation: PACU  Patient participation: complete - patient participated  Level of consciousness: awake and alert  Pain management: adequate  Airway patency: patent  Anesthetic complications: no  Cardiovascular status: acceptable  Respiratory status: acceptable  Hydration status: acceptable  Post anesthesia nausea and vomiting:  none      Visit Vitals  /74 (BP 1 Location: Right arm, BP Patient Position: At rest)   Pulse 67   Temp 36.7 °C (98.1 °F)   Resp 15   Ht 4' 9\" (1.448 m)   Wt 63.7 kg (140 lb 6.9 oz)   SpO2 99%   BMI 30.39 kg/m²

## 2019-07-15 ENCOUNTER — HOSPITAL ENCOUNTER (OUTPATIENT)
Dept: MAMMOGRAPHY | Age: 69
Discharge: HOME | End: 2019-07-15
Attending: INTERNAL MEDICINE
Payer: MEDICARE

## 2019-07-15 DIAGNOSIS — Z12.39 BREAST SCREENING: ICD-10-CM

## 2019-07-15 PROCEDURE — 77067 SCR MAMMO BI INCL CAD: CPT

## 2020-01-31 ENCOUNTER — ANESTHESIA EVENT (OUTPATIENT)
Dept: SURGERY | Age: 70
End: 2020-01-31
Payer: MEDICARE

## 2020-02-03 NOTE — H&P
G I Procedure Note           Endoscopy History and Physical               [facility] [address]  [facility] [address] [facility] [id number]  [id number]    1950  68 y.o.  female      Date of Procedure:   Preoperative Diagnosis:       Procedure:    2/4/2020           RECTAL BLEEDING                              Procedure(s):  COLONOSCOPY      Gastroenterologist:  Anesthesia:           MD CRYS Garcia            History and procedure indication:  Maximiliano Cannon is a 68 y.o. BLACK OR  female who presents with: RECTAL BLEEDING   including the additional history of Screening ,Rectal bleeding,,        Past Medical History:   Diagnosis Date    Arthritis     GERD (gastroesophageal reflux disease)     Hypertension     Menopause     LMP-December, 1999      Prior to Admission medications    Medication Sig Start Date End Date Taking? Authorizing Provider   oxyCODONE-acetaminophen (PERCOCET) 5-325 mg per tablet Take 1 Tab by mouth every four (4) hours as needed for Pain. Max Daily Amount: 6 Tabs. 9/24/18   Bib Coats, DPM   ibuprofen (MOTRIN) 200 mg tablet Take 3 Tabs by mouth three (3) times daily as needed for Pain. 9/24/18   Bib Coats, DPM   ibuprofen (MOTRIN) 200 mg tablet Take 3 Tabs by mouth two (2) times a day. 8/1/16   Bib Coats, DPM   amLODIPine (NORVASC) 10 mg tablet Take 10 mg by mouth daily. Provider, Historical   lisinopril (PRINIVIL, ZESTRIL) 20 mg tablet Take 20 mg by mouth daily. Provider, Historical   potassium citrate (UROCIT-K10) 10 mEq (1,080 mg) TbER Take  by mouth.     Provider, Historical     No Known Allergies    Past Surgical History:   Procedure Laterality Date    ABDOMEN SURGERY PROC UNLISTED      gallbladder    HX OTHER SURGICAL Left     bunionectomy    AGAPITO STEREO  BX BREAST LT 1ST LESION W/CLIP AND SPECIMEN Left long ago    Benign     No family history on file. Social History     Tobacco Use    Smoking status: Former Smoker    Smokeless tobacco: Never Used   Substance Use Topics    Alcohol use: No                                                      PHYSICAL EXAM   There were no vitals taken for this visit. General appearance:  alert, well appearing, and in no distress  Mental status:  normal mood, behavior, speech, dress, motor activity and thought processes  Nose:      normal and patent, no erythema, discharge or polyps  Mouth:- mucous membranes moist, pharynx normal without lesions                  [x]  No Loose teeth      []    Loose teeth  Finger opening:  []1     []1.5    [] 2     [] 2.5     [x] 3      [] 3.5     [] 4   Mallampati:         [] Class 1     [x] Class 2    [] Class 3      [] Class 4      Neck - supple,      [x] Full ROM [] Decreased ROM  [] Short Neck no significant adenopathy    Chest - clear to auscultation, no wheezes, rales or rhonchi, symmetric air entry  Heart: normal rate, regular rhythm, normal S1, S2, no murmurs, rubs, clicks or gallops  Abdomen: abdomen soft, bowel sounds  [x] normal  [] increased  [] hypoactive                       [] no tenderness  [] epigastric tenderness  [] LLQ tenderness   [] RLQ tenderness                      No masses, organomegaly or guarding. Rectal exam: negative without mass, lesions or tenderness  Extremities: peripheral pulses normal, no pedal edema, no clubbing or cyanosis  Neurologic: Alert and oriented to person, place, and time; normal strength and tone.                          Normal symmetric reflexes  Normal gait:                                      Assessement:                                 Pre op dx:  RECTAL BLEEDING   Additional medical problems list below   Patient Active Problem List   Diagnosis Code    Hallux rigidus of left foot M20.22    Hallux valgus (acquired), right foot M20.11 This note documentation was performed prior to this planned procedure       after a history and physical was performed in the office. Date: 1 8 2020   Office exam   2/4/2020 Immediate update no changes in H&P                        Pre Procedure Evaluation (per anesthesia or per h&p)                                                Sedation/Assessment:                                                                                               Mallampati Classification                            []Class 1                    []Class 2                    [] Class 3                  [] Class 4                                              ASA classfication         []     Class I: Normally healthy         []     Class II: Patient with mild systemic disease (e.g. hypertension)         []     Class III: Patient with severe systemic disease (e.g. CHF), non-decompensated         []     Class IV: Patient with severe systemic disease, decompensated         []     Class V: Moribund patient, survival unlikely                     Plan:  []  Egd                                 [x] Colonoscopy                               [] with Moderate Sedation /Conscious Sedation                                 [x] MAC          Patient stable for planned procedure. See orders.      Noah Zimmerman MD

## 2020-02-04 ENCOUNTER — ANESTHESIA (OUTPATIENT)
Dept: SURGERY | Age: 70
End: 2020-02-04
Payer: MEDICARE

## 2020-02-04 ENCOUNTER — HOSPITAL ENCOUNTER (OUTPATIENT)
Age: 70
Setting detail: OUTPATIENT SURGERY
Discharge: HOME | End: 2020-02-04
Attending: INTERNAL MEDICINE | Admitting: INTERNAL MEDICINE
Payer: MEDICARE

## 2020-02-04 VITALS
TEMPERATURE: 97.7 F | OXYGEN SATURATION: 99 % | RESPIRATION RATE: 16 BRPM | SYSTOLIC BLOOD PRESSURE: 123 MMHG | HEART RATE: 57 BPM | BODY MASS INDEX: 32.36 KG/M2 | DIASTOLIC BLOOD PRESSURE: 77 MMHG | HEIGHT: 57 IN | WEIGHT: 150 LBS

## 2020-02-04 PROCEDURE — 76210000063 HC OR PH I REC FIRST 0.5 HR: Performed by: INTERNAL MEDICINE

## 2020-02-04 PROCEDURE — 76210000020 HC REC RM PH II FIRST 0.5 HR: Performed by: INTERNAL MEDICINE

## 2020-02-04 PROCEDURE — 74011250636 HC RX REV CODE- 250/636: Performed by: ANESTHESIOLOGY

## 2020-02-04 PROCEDURE — 76010000154 HC OR TIME FIRST 0.5 HR: Performed by: INTERNAL MEDICINE

## 2020-02-04 PROCEDURE — 76060000031 HC ANESTHESIA FIRST 0.5 HR: Performed by: INTERNAL MEDICINE

## 2020-02-04 RX ORDER — SODIUM CHLORIDE 0.9 % (FLUSH) 0.9 %
5-40 SYRINGE (ML) INJECTION AS NEEDED
Status: DISCONTINUED | OUTPATIENT
Start: 2020-02-04 | End: 2020-02-04 | Stop reason: SDUPTHER

## 2020-02-04 RX ORDER — SODIUM CHLORIDE 0.9 % (FLUSH) 0.9 %
5-40 SYRINGE (ML) INJECTION EVERY 8 HOURS
Status: DISCONTINUED | OUTPATIENT
Start: 2020-02-04 | End: 2020-02-04 | Stop reason: SDUPTHER

## 2020-02-04 RX ORDER — LIDOCAINE HYDROCHLORIDE 20 MG/ML
5 SOLUTION OROPHARYNGEAL AS NEEDED
Status: DISCONTINUED | OUTPATIENT
Start: 2020-02-04 | End: 2020-02-04 | Stop reason: HOSPADM

## 2020-02-04 RX ORDER — HYDROMORPHONE HYDROCHLORIDE 1 MG/ML
0.5 INJECTION, SOLUTION INTRAMUSCULAR; INTRAVENOUS; SUBCUTANEOUS
Status: DISCONTINUED | OUTPATIENT
Start: 2020-02-04 | End: 2020-02-04 | Stop reason: HOSPADM

## 2020-02-04 RX ORDER — FLUMAZENIL 0.1 MG/ML
0.2 INJECTION INTRAVENOUS
Status: DISCONTINUED | OUTPATIENT
Start: 2020-02-04 | End: 2020-02-04 | Stop reason: HOSPADM

## 2020-02-04 RX ORDER — DEXTROSE MONOHYDRATE AND SODIUM CHLORIDE 5; .9 G/100ML; G/100ML
100 INJECTION, SOLUTION INTRAVENOUS CONTINUOUS
Status: DISCONTINUED | OUTPATIENT
Start: 2020-02-04 | End: 2020-02-04 | Stop reason: HOSPADM

## 2020-02-04 RX ORDER — SODIUM CHLORIDE 0.9 % (FLUSH) 0.9 %
5-40 SYRINGE (ML) INJECTION AS NEEDED
Status: DISCONTINUED | OUTPATIENT
Start: 2020-02-04 | End: 2020-02-04 | Stop reason: HOSPADM

## 2020-02-04 RX ORDER — DIPHENHYDRAMINE HYDROCHLORIDE 50 MG/ML
50 INJECTION, SOLUTION INTRAMUSCULAR; INTRAVENOUS ONCE
Status: DISCONTINUED | OUTPATIENT
Start: 2020-02-04 | End: 2020-02-04 | Stop reason: HOSPADM

## 2020-02-04 RX ORDER — NALOXONE HYDROCHLORIDE 0.4 MG/ML
0.4 INJECTION, SOLUTION INTRAMUSCULAR; INTRAVENOUS; SUBCUTANEOUS
Status: DISCONTINUED | OUTPATIENT
Start: 2020-02-04 | End: 2020-02-04 | Stop reason: HOSPADM

## 2020-02-04 RX ORDER — FENTANYL CITRATE 50 UG/ML
25 INJECTION, SOLUTION INTRAMUSCULAR; INTRAVENOUS
Status: DISCONTINUED | OUTPATIENT
Start: 2020-02-04 | End: 2020-02-04 | Stop reason: HOSPADM

## 2020-02-04 RX ORDER — DEXTROMETHORPHAN/PSEUDOEPHED 2.5-7.5/.8
1.2 DROPS ORAL
Status: DISCONTINUED | OUTPATIENT
Start: 2020-02-04 | End: 2020-02-04 | Stop reason: HOSPADM

## 2020-02-04 RX ORDER — SODIUM CHLORIDE 9 MG/ML
50 INJECTION, SOLUTION INTRAVENOUS CONTINUOUS
Status: DISCONTINUED | OUTPATIENT
Start: 2020-02-04 | End: 2020-02-04 | Stop reason: HOSPADM

## 2020-02-04 RX ORDER — SODIUM CHLORIDE 9 MG/ML
100 INJECTION, SOLUTION INTRAVENOUS CONTINUOUS
Status: DISCONTINUED | OUTPATIENT
Start: 2020-02-04 | End: 2020-02-04 | Stop reason: HOSPADM

## 2020-02-04 RX ORDER — MIDAZOLAM HYDROCHLORIDE 1 MG/ML
5 INJECTION, SOLUTION INTRAMUSCULAR; INTRAVENOUS
Status: DISCONTINUED | OUTPATIENT
Start: 2020-02-04 | End: 2020-02-04 | Stop reason: HOSPADM

## 2020-02-04 RX ORDER — SODIUM CHLORIDE 0.9 % (FLUSH) 0.9 %
5-40 SYRINGE (ML) INJECTION EVERY 8 HOURS
Status: DISCONTINUED | OUTPATIENT
Start: 2020-02-04 | End: 2020-02-04 | Stop reason: HOSPADM

## 2020-02-04 RX ORDER — PROPOFOL 10 MG/ML
INJECTION, EMULSION INTRAVENOUS
Status: DISCONTINUED | OUTPATIENT
Start: 2020-02-04 | End: 2020-02-04 | Stop reason: HOSPADM

## 2020-02-04 RX ORDER — ATROPINE SULFATE 0.1 MG/ML
0.5 INJECTION INTRAVENOUS
Status: DISCONTINUED | OUTPATIENT
Start: 2020-02-04 | End: 2020-02-04 | Stop reason: HOSPADM

## 2020-02-04 RX ORDER — PROPOFOL 10 MG/ML
INJECTION, EMULSION INTRAVENOUS AS NEEDED
Status: DISCONTINUED | OUTPATIENT
Start: 2020-02-04 | End: 2020-02-04 | Stop reason: HOSPADM

## 2020-02-04 RX ORDER — FENTANYL CITRATE 50 UG/ML
100 INJECTION, SOLUTION INTRAMUSCULAR; INTRAVENOUS ONCE
Status: DISCONTINUED | OUTPATIENT
Start: 2020-02-04 | End: 2020-02-04 | Stop reason: HOSPADM

## 2020-02-04 RX ORDER — EPINEPHRINE 0.1 MG/ML
1 INJECTION INTRACARDIAC; INTRAVENOUS
Status: DISCONTINUED | OUTPATIENT
Start: 2020-02-04 | End: 2020-02-04 | Stop reason: HOSPADM

## 2020-02-04 RX ORDER — SODIUM CHLORIDE, SODIUM LACTATE, POTASSIUM CHLORIDE, CALCIUM CHLORIDE 600; 310; 30; 20 MG/100ML; MG/100ML; MG/100ML; MG/100ML
50 INJECTION, SOLUTION INTRAVENOUS CONTINUOUS
Status: DISCONTINUED | OUTPATIENT
Start: 2020-02-04 | End: 2020-02-04 | Stop reason: HOSPADM

## 2020-02-04 RX ORDER — LIDOCAINE HYDROCHLORIDE 10 MG/ML
0.1 INJECTION, SOLUTION EPIDURAL; INFILTRATION; INTRACAUDAL; PERINEURAL AS NEEDED
Status: DISCONTINUED | OUTPATIENT
Start: 2020-02-04 | End: 2020-02-04 | Stop reason: HOSPADM

## 2020-02-04 RX ORDER — LORAZEPAM 2 MG/ML
2 INJECTION INTRAMUSCULAR AS NEEDED
Status: DISCONTINUED | OUTPATIENT
Start: 2020-02-04 | End: 2020-02-04 | Stop reason: HOSPADM

## 2020-02-04 RX ADMIN — PROPOFOL 30 MG: 10 INJECTION, EMULSION INTRAVENOUS at 11:19

## 2020-02-04 RX ADMIN — PROPOFOL 50 MG: 10 INJECTION, EMULSION INTRAVENOUS at 11:17

## 2020-02-04 RX ADMIN — PROPOFOL 100 MCG/KG/MIN: 10 INJECTION, EMULSION INTRAVENOUS at 11:13

## 2020-02-04 NOTE — PROCEDURES
G I Procedure Note            COLONOSCOPY   Dr. Putnam [place] office   [address]                                   [phone]                                  [id number]   1950                                      68 y.o.                                    female      Procedure Date: 2/4/2020   [x]  Anesthesia MAC                                                                                                Pre Op Diagnosis:    Indications:                   1. RECTAL BLEEDING                                                                                                                                                                          Post Op Diagnosis:                    1.   RARE DIVERTUCULOSIS                                                           2.   Internal hemorrhoids                           H&p completed: Yes            Anesthesia Assessment: Performed prior to procedure:      No change  Anesthesia Plan: Performed prior to procedure:                   No change       Medications: See Reviewed List and Reconcilation           Informed consent was obtained     Risk Statement:  Prior to the procedure the risks were explained to the patient and/or to the family including but not limited to perforation, bleeding, adverse drug reaction, aspiration, and even the need for possible surgery. A colonoscopy exam is not 100% accurate which may be related to preparation or blind spots during the exam.The possibility that an abnormality and /or cancer could be missed was also discussed as well as alternative x-ray options.          Instrument:    Olympus adult Videocolonoscope                                   Immediate Procedure Reassessment Completed     With the patient in the left lateral position, a rectal examination was performed and the findings were: negative without mass, lesions or tenderness   The Olympus Video colonoscope was inserted under direct vision into the rectum. The colonoscope was passed from the rectum to the cecum, which was identified by the ileocecal valve. The colon findings demonstrated:  ANUS: Anal exam reveals no masses or external hemorrhoids, sphincter tone is normal.   RECTUM: Rectal exam reveals no masses  . SIGMOID COLON: The sigmoid was unremarkable except as noted below   Findings below   DESCENDING COLON:  The videoscolonoscope was advanced carefully. Findings below  SPLENIC FLEXURE: The splenic flexure is normal.   TRANSVERSE COLON:  The typical triangular pattern was noted. Findings below      HEPATIC FLEXURE: The hepatic flexure is normal.   ASCENDING COLON:  No  bleeding Findings below     CECUM:  The ileocecal valve appears normal.   TERMINAL ILEUM: The terminal ileum was not entered. Finding noted      [] mucosa normal      [] Diverticulosis     [] avm     [] Additional findings:       . The colonoscope was slowly withdrawn >6 minute period and the instrument was retroflexed in the rectum. The rectal findings were:Protruding lesions:     -Internal Hemorrhoids  The patient tolerated the entire procedure well. Blood Loss nil  No complications  Anesthesia  MAC  No crystalloids  No Implants  Assistants : per nursing documentation team members     For biopsy  Specimen verification by physician and nurse two sources, name,           social security numbers     Colon preparation was good    Recommendations:     - For colon cancer screening in this average-risk patient, colonoscopy may be repeated in 10 years.       Copies sent to   Je Chin MD  CC:  Marimar Montes De Oca MD

## 2020-02-04 NOTE — ANESTHESIA PREPROCEDURE EVALUATION
Anesthetic History   No history of anesthetic complications            Review of Systems / Medical History  Patient summary reviewed and pertinent labs reviewed    Pulmonary                   Neuro/Psych   Within defined limits           Cardiovascular    Hypertension              Exercise tolerance: <4 METS     GI/Hepatic/Renal     GERD           Endo/Other        Arthritis     Other Findings              Physical Exam    Airway  Mallampati: II  TM Distance: 4 - 6 cm  Neck ROM: normal range of motion   Mouth opening: Normal     Cardiovascular    Rhythm: regular  Rate: normal         Dental    Dentition: Lower partial plate and Upper partial plate     Pulmonary  Breath sounds clear to auscultation               Abdominal  GI exam deferred       Other Findings            Anesthetic Plan    ASA: 2  Anesthesia type: MAC          Induction: Intravenous  Anesthetic plan and risks discussed with: Patient

## 2020-02-04 NOTE — DISCHARGE INSTRUCTIONS
Endoscopy Discharge Instructions     Dr. Michelle Murphy office                                            NAME: Rowan ALARCON XULEJZ:[id number]    AGE:  68 y.o. YOB: 1950                                                              FINAL Discharge Procedure and Diagnosis:       Procedure(s):  COLONOSCOPY       FINDINGS:     Hemorrhoids  Rare diverticulae                                        MEDICATIONS    [x] CONTINUE CURRENT MEDICATIONS     [] NEW MEDICATIONS           1.    2.    3.         Testing   Schedule              Colonoscopy Screening                                   Recommendations       []  Repeat colonoscopy in 6-12 month 2nd        to Inadequate  prep    []  Repeat colonoscopy in 3 years    []  Repeat colonoscopy in 5 years    [x]  Repeat colonoscopy in 10 years         New additional  Tests  Call the office   ([phone]) for the appointment time      []      []      []                                     YOUR NEXT APPOINTMENT WITH DR Esme Cortez:                                                                                                                                [x]   None follow up with pcp   []  1 week       []   2 week    []  1 month    Always keep Richmond Robles MD for regular medical follow up                                                                                                                         If you had a colonoscopy the \"C\" indicates specific instructions        x                                           Diet Instructions :   Ordinarily you may resume your previous diet but your initial diet should be       Light your discharge nurse will go over this with you. Large meals can cause  abdominal discomfort after these procedures.                                                                            Specific Diet Recommendations:        [x] High fiber diet. https://www.[employer]. com/diets/        [] GERD diet: avoid fried and fatty foods, peppermint, chocolate, alcohol,               coffee, citrus fruits and juices, and tomato products. Avoid lying down for            2 to 3  hours after eating. https://www.hernandez.com/. com/diets/            []  FODMAP DIET  DeathUnit.nl              []  All diets eg high fiber, gastroparesis. , weight loss , gluten free             1. Red Zebra              2.  https://www.[employer]. TweetDeck/diets/           __x__  Deadra Fossa may feel quite tired and need to rest and recuperate for several hours    following these procedures. __x__  Due to the fact that sedation was administered for this procedure, do not drive,   operate machinery or sign legal documents for the next 24 hours. __x__  Mild abdominal pain may be experienced after your procedure, but is should   disappear after several hours. Notify your physician if you have persistent pain,   tenderness or abdominal distension. __x__  C    Many patients for the first few hours following the exam may experience         belching or passing gas through the rectum. Walking may help to relieve        distention and gas pains. A warm bath or shower will often help with abdominal  cramping.                                                                                            __x__   Deadra Fossa may return to your normal routine tomorrow, according to how you feel        and depending on your doctors instructions. Be sure to call your doctor to make  an appointment for a post-surgery check-up on the date your doctor has   requested. __x__ C     Rectal bleeding or spotting in small amounts may occur with the first bowel   movement following a colonoscopy or sigmoidoscopy.  If a large amount of blood is noted call immediately     __x__  You may experience a numbness or lack of sensation in throat. If present, do not     eat or drink. Before eating, test your ability to drink with small sips of water. Y     You may try clear liquids or soups. If you tolerate these, you may then eat solid     food which is not greasy or spicy. __x__ C     IF POLYPS REMOVED: Avoid any blood thinning medication such as plavix,   aspirin or coumadin  NSAIDS (like advil or alleve) for 7 days. __x__  Notify your physician if you cough or vomit blood or experience chest pain. Your biopsy or testing result should be available in 7-10 days                                                                                                                      Prescription will be electronically sent to your pharmacy you must     let your nurse know your pharmacy:                                                                                                                                          [address] [place] [address]. TO HELP ENSURE A SMOOTH RECOVERY,       IT IS IMPORTANT TO FOLLOW THEM. _x___Pamphlet /Educational Information provided for diagnostic findings     Additional education information can assessed at the sites below:   [facility]   http://www.digestive. niddk.nih.gov/ddiseases/a-z.asp      Web MD patient information                                                                                                Signature of individual given instructions :   Date: 2/4/2020                                                                                                                                   Bladder Training: Care Instructions  Your Care Instructions    Bladder training is used to treat urge incontinence and stress incontinence.  Urge incontinence means that the need to urinate comes on so fast that you can't get to a toilet in time. Stress incontinence means that you leak urine because of pressure on your bladder. For example, it may happen when you laugh, cough, or lift something heavy. Bladder training can increase how long you can wait before you have to urinate. It can also help your bladder hold more urine. And it can give you better control over the urge to urinate. It is important to remember that bladder training takes a few weeks to a few months to make a difference. You may not see results right away, but don't give up. Follow-up care is a key part of your treatment and safety. Be sure to make and go to all appointments, and call your doctor if you are having problems. It's also a good idea to know your test results and keep a list of the medicines you take. How can you care for yourself at home? Work with your doctor to come up with a bladder training program that is right for you. You may use one or more of the following methods. Delayed urination  · In the beginning, try to keep from urinating for 5 minutes after you first feel the need to go. · While you wait, take deep, slow breaths to relax. Kegel exercises can also help you delay the need to go to the bathroom. · After some practice, when you can easily wait 5 minutes to urinate, try to wait 10 minutes before you urinate. · Slowly increase the waiting period until you are able to control when you have to urinate. Scheduled urination  · Empty your bladder when you first wake up in the morning. · Schedule times throughout the day when you will urinate. · Start by going to the bathroom every hour, even if you don't need to go. · Slowly increase the time between trips to the bathroom. · When you have found a schedule that works well for you, keep doing it. · If you wake up during the night and have to urinate, do it. Apply your schedule to waking hours only.   Kegel exercises  These tighten and strengthen pelvic muscles, which can help you control the flow of urine. To do Kegel exercises:  · Squeeze the same muscles you would use to stop your urine. Your belly and thighs should not move. · Hold the squeeze for 3 seconds, and then relax for 3 seconds. · Start with 3 seconds. Then add 1 second each week until you are able to squeeze for 10 seconds. · Repeat the exercise 10 to 15 times a session. Do three or more sessions a day. When should you call for help? Watch closely for changes in your health, and be sure to contact your doctor if:    · Your incontinence is getting worse.     · You do not get better as expected. Where can you learn more? Go to http://yoshi-ben.info/. Enter Y028 in the search box to learn more about \"Bladder Training: Care Instructions. \"  Current as of: December 19, 2018  Content Version: 12.2  © 3852-1974 [employer] Care instructions adapted under license by [employer] (which disclaims liability or warranty for this information). If you have questions about a medical condition or this instruction, always ask your healthcare professional. [address] any warranty or liability for your use of this information. DISCHARGE SUMMARY from Nurse    PATIENT INSTRUCTIONS:    After general anesthesia or intravenous sedation, for 24 hours or while taking prescription Narcotics:  · Limit your activities  · Do not drive and operate hazardous machinery  · Do not make important personal or business decisions  · Do  not drink alcoholic beverages  · If you have not urinated within 8 hours after discharge, please contact your surgeon on call. *  Please update this list whenever your medications are discontinued, doses are      changed, or new medications (including over-the-counter products) are added. *  Please carry medication information at all times in case of emergency situations.     These are general instructions for a healthy lifestyle:    No smoking/ No tobacco products/ Avoid exposure to second hand smoke  Surgeon General's Warning:  Quitting smoking now greatly reduces serious risk to your health. Obesity, smoking, and sedentary lifestyle greatly increases your risk for illness    A healthy diet, regular physical exercise & weight monitoring are important for maintaining a healthy lifestyle    You may be retaining fluid if you have a history of heart failure or if you experience any of the following symptoms:  Weight gain of 3 pounds or more overnight or 5 pounds in a week, increased swelling in our hands or feet or shortness of breath while lying flat in bed. Please call your doctor as soon as you notice any of these symptoms; do not wait until your next office visit. The discharge information has been reviewed with the patient and spouse. The patient and spouse verbalized understanding. Discharge medications reviewed with the patient and spouse and appropriate educational materials and side effects teaching were provided.   ___________________________________________________________________________________________________________________________________

## 2020-02-04 NOTE — PERIOP NOTES
Handoff Report from Operating Room to PACU    Report received from JUAN JOSE Bueno RN and Nathan Casey MD regarding Sona Sarabia. Surgeon(s):  Nilesh Gonzalez MD  And Procedure(s) (LRB):  COLONOSCOPY (N/A)  confirmed with allergies discussed. Anesthesia type, drugs, patient history, complications, estimated blood loss, vital signs, intake and output, and lines and temperature were reviewed.

## 2020-02-04 NOTE — ANESTHESIA POSTPROCEDURE EVALUATION
Procedure(s):  COLONOSCOPY. MAC    Anesthesia Post Evaluation      Multimodal analgesia: multimodal analgesia used between 6 hours prior to anesthesia start to PACU discharge  Patient location during evaluation: PACU  Patient participation: complete - patient participated  Level of consciousness: awake and alert  Pain management: adequate  Airway patency: patent  Anesthetic complications: no  Cardiovascular status: acceptable  Respiratory status: acceptable  Hydration status: acceptable  Post anesthesia nausea and vomiting:  none      Vitals Value Taken Time   /83 2/4/2020 11:41 AM   Temp 36.7 °C (98.1 °F) 2/4/2020 11:41 AM   Pulse 60 2/4/2020 11:41 AM   Resp 19 2/4/2020 11:41 AM   SpO2 99 % 2/4/2020 11:41 AM   Vitals shown include unvalidated device data.

## 2020-02-04 NOTE — PERIOP NOTES
Patient discharged home to self care. Discharge teaching conducted with the patient and her . Discharge teaching included follow up appointments, diet and activity recommendations, safety after anesthesia, and when to seek medical attention; no new medications were prescribed.   The patient and her  verbalized understanding of all discharge teaching provided and had no further questions or concerns expressed at the time of departure from the hospital.

## 2020-06-10 ENCOUNTER — HOSPITAL ENCOUNTER (OUTPATIENT)
Dept: GENERAL RADIOLOGY | Age: 70
Discharge: HOME | End: 2020-06-10
Payer: MEDICARE

## 2020-06-10 DIAGNOSIS — M54.2 NECK PAIN: ICD-10-CM

## 2020-06-10 PROCEDURE — 72050 X-RAY EXAM NECK SPINE 4/5VWS: CPT

## 2020-12-02 ENCOUNTER — TRANSCRIBE ORDER (OUTPATIENT)
Dept: REGISTRATION | Age: 70
End: 2020-12-02

## 2020-12-02 DIAGNOSIS — Z12.31 VISIT FOR SCREENING MAMMOGRAM: Primary | ICD-10-CM

## 2020-12-09 ENCOUNTER — HOSPITAL ENCOUNTER (OUTPATIENT)
Dept: MAMMOGRAPHY | Age: 70
Discharge: HOME | End: 2020-12-09
Attending: INTERNAL MEDICINE
Payer: MEDICAID

## 2020-12-09 DIAGNOSIS — Z12.31 VISIT FOR SCREENING MAMMOGRAM: ICD-10-CM

## 2020-12-09 PROCEDURE — 77067 SCR MAMMO BI INCL CAD: CPT

## 2020-12-23 ENCOUNTER — OFFICE VISIT (OUTPATIENT)
Dept: PRIMARY CARE CLINIC | Age: 70
End: 2020-12-23
Payer: MEDICAID

## 2020-12-23 VITALS
HEIGHT: 57 IN | HEART RATE: 73 BPM | SYSTOLIC BLOOD PRESSURE: 146 MMHG | RESPIRATION RATE: 14 BRPM | OXYGEN SATURATION: 98 % | BODY MASS INDEX: 33.48 KG/M2 | TEMPERATURE: 98.1 F | WEIGHT: 155.2 LBS | DIASTOLIC BLOOD PRESSURE: 82 MMHG

## 2020-12-23 DIAGNOSIS — Z11.59 NEED FOR HEPATITIS C SCREENING TEST: ICD-10-CM

## 2020-12-23 DIAGNOSIS — D17.1 LIPOMA OF BACK: ICD-10-CM

## 2020-12-23 DIAGNOSIS — M19.90 ARTHRITIS: ICD-10-CM

## 2020-12-23 DIAGNOSIS — K64.4 EXTERNAL HEMORRHOID: ICD-10-CM

## 2020-12-23 DIAGNOSIS — I10 ESSENTIAL HYPERTENSION: Primary | ICD-10-CM

## 2020-12-23 DIAGNOSIS — L30.9 ECZEMA, UNSPECIFIED TYPE: ICD-10-CM

## 2020-12-23 DIAGNOSIS — M81.0 POSTMENOPAUSAL BONE LOSS: ICD-10-CM

## 2020-12-23 PROCEDURE — 99204 OFFICE O/P NEW MOD 45 MIN: CPT | Performed by: INTERNAL MEDICINE

## 2020-12-23 RX ORDER — LISINOPRIL AND HYDROCHLOROTHIAZIDE 20; 25 MG/1; MG/1
TABLET ORAL
COMMUNITY
Start: 2020-12-08 | End: 2021-01-15 | Stop reason: ALTCHOICE

## 2020-12-23 NOTE — PROGRESS NOTES
Written by Clayton Watson, as dictated by Dr. Claudio Walker MD.    Alyx Lay is a 65 y.o. female. HPI  Pt presents today to establish care. She had previously been seeing Dr. Bala Downs. She is here to discuss hemorrhoids which have been giving her trouble for over 2 years now. She has been referred to a surgeon before, but they were not in her insurance network. She requests a referral to a surgeon in her network. She has a knot on her R side which has been getting larger. She showed it to SARA Lebron in his practice before but was told not to worry. She takes Prinzide 20-25 mg every day but missed it today, and her BP is elevated today in office at 143/82, 146/82 on manual repeat in L arm while sitting down. She has a BP cuff at home and checks her BP occasionally. She has taken this medication for years and tends to get HA if her BP is high. She has fhx of heart disease (father). She has constant aches and pains in her joints and is feeling like she has developed arthritis. At night they wake her up from time to time. She had her eye exam done last week with Dr. Mami Ha and has been told she is developing cataracts. She has no glaucoma. She has eczema which flares up from time to time. She applies hydrocortisone ointment to it but has been having trouble finding kind she used to buy OTC anymore. It is not currently bothering her today. Her R forearm has been swollen since January or February of 2020 and she is not sure why. She inquires what is causing this. She does not get flu vaccines and is not interested in getting one this year. She does not think she has ever received a Tdap vaccine, she does not want the Shingrix vaccine at this time, and she will think about the pneumonia vaccine. She prefers not to get vaccines unless they are absolutely necessary.      Patient Active Problem List   Diagnosis Code    Hallux rigidus of left foot M20.22    Hallux valgus (acquired), right foot M20.11    Eczema L30.9    Essential hypertension I10    Arthritis M19.90        Current Outpatient Medications on File Prior to Visit   Medication Sig Dispense Refill    lisinopril-hydroCHLOROthiazide (PRINZIDE, ZESTORETIC) 20-25 mg per tablet       amLODIPine (NORVASC) 10 mg tablet Take 10 mg by mouth daily.  [DISCONTINUED] oxyCODONE-acetaminophen (PERCOCET) 5-325 mg per tablet Take 1 Tab by mouth every four (4) hours as needed for Pain. Max Daily Amount: 6 Tabs. 30 Tab 0    [DISCONTINUED] ibuprofen (MOTRIN) 200 mg tablet Take 3 Tabs by mouth three (3) times daily as needed for Pain. 90 Tab 1    [DISCONTINUED] ibuprofen (MOTRIN) 200 mg tablet Take 3 Tabs by mouth two (2) times a day. 60 Tab 1    potassium citrate (UROCIT-K10) 10 mEq (1,080 mg) TbER Take  by mouth.  [DISCONTINUED] lisinopril (PRINIVIL, ZESTRIL) 20 mg tablet Take 20 mg by mouth daily. No current facility-administered medications on file prior to visit. No Known Allergies    Past Medical History:   Diagnosis Date    Arthritis     GERD (gastroesophageal reflux disease)     Hypertension     Menopause     LMP-December, 1999       Past Surgical History:   Procedure Laterality Date    ABDOMEN SURGERY PROC UNLISTED      gallbladder    COLONOSCOPY N/A 2/4/2020    COLONOS[facility] at [facility] HX OTHER SURGICAL Left     bunionectomy    AGAPITO STEREO  BX BREAST LT 1ST LESION W/CLIP AND SPECIMEN Left long ago    Benign       No family history on file.     Social History     Socioeconomic History    Marital status: LEGALLY      Spouse name: Not on file    Number of children: Not on file    Years of education: Not on file    Highest education level: Not on file   Occupational History    Not on file   Social Needs    Financial resource strain: Not on file    Food insecurity     Worry: Not on file     Inability: Not on file   Mandy Narayanan Transportation needs     Medical: Not on file     Non-medical: Not on file   Tobacco Use    Smoking status: Former Smoker    Smokeless tobacco: Never Used   Substance and Sexual Activity    Alcohol use: No    Drug use: No    Sexual activity: Not on file   Lifestyle    Physical activity     Days per week: Not on file     Minutes per session: Not on file    Stress: Not on file   Relationships    Social connections     Talks on phone: Not on file     Gets together: Not on file     Attends Mandaeism service: Not on file     Active member of club or organization: Not on file     Attends meetings of clubs or organizations: Not on file     Relationship status: Not on file    Intimate partner violence     Fear of current or ex partner: Not on file     Emotionally abused: Not on file     Physically abused: Not on file     Forced sexual activity: Not on file   Other Topics Concern    Not on file   Social History Narrative    Not on file       No visits with results within 3 Month(s) from this visit.    Latest known visit with results is:   Hospital Outpatient Visit on 09/21/2018   Component Date Value Ref Range Status    Ventricular Rate 09/21/2018 62  BPM Final    Atrial Rate 09/21/2018 62  BPM Final    P-R Interval 09/21/2018 124  ms Final    QRS Duration 09/21/2018 66  ms Final    Q-T Interval 09/21/2018 422  ms Final    QTC Calculation (Bezet) 09/21/2018 428  ms Final    Calculated P Axis 09/21/2018 42  degrees Final    Calculated R Axis 09/21/2018 18  degrees Final    Calculated T Axis 09/21/2018 38  degrees Final    Diagnosis 09/21/2018    Final                    Value:Normal sinus rhythm  Normal ECG    Confirmed by Claderon Lauren ([id number]) on 9/21/2018 11:21:54 AM      WBC 09/21/2018 6.6  3.6 - 11.0 K/uL Final    Comment: Due to mathematical rounding between the [address], and the new [employer] analyzers, the reported automated differential may vary by up to +/- 0.5% per cell line. This finding may produce a result that is 100% +/- 3%, which is clinically insignificant.  RBC 09/21/2018 4.14  3.80 - 5.20 M/uL Final    HGB 09/21/2018 11.4* 11.5 - 16.0 g/dL Final    HCT 09/21/2018 36.2  35.0 - 47.0 % Final    MCV 09/21/2018 87.4  80.0 - 99.0 FL Final    MCH 09/21/2018 27.5  26.0 - 34.0 PG Final    MCHC 09/21/2018 31.5  30.0 - 36.5 g/dL Final    RDW 09/21/2018 12.8  11.5 - 14.5 % Final    PLATELET 86/72/6116 777  150 - 400 K/uL Final    MPV 09/21/2018 9.7  8.9 - 12.9 FL Final    NRBC 09/21/2018 0.0  0  WBC Final    ABSOLUTE NRBC 09/21/2018 0.00  0.00 - 0.01 K/uL Final    Sodium 09/21/2018 139  136 - 145 mmol/L Final    Potassium 09/21/2018 4.4  3.5 - 5.1 mmol/L Final    Chloride 09/21/2018 102  97 - 108 mmol/L Final    CO2 09/21/2018 30  21 - 32 mmol/L Final    Anion gap 09/21/2018 7  5 - 15 mmol/L Final    Glucose 09/21/2018 99  65 - 100 mg/dL Final    BUN 09/21/2018 26* 6 - 20 MG/DL Final    Creatinine 09/21/2018 1.49* 0.55 - 1.02 MG/DL Final    BUN/Creatinine ratio 09/21/2018 17  12 - 20   Final    GFR est AA 09/21/2018 42* >60 ml/min/1.73m2 Final    GFR est non-AA 09/21/2018 35* >60 ml/min/1.73m2 Final    Comment: Estimated GFR is calculated using the IDMS-traceable Modification of Diet in Renal Disease (MDRD) Study equation, reported for both  Americans (GFRAA) and non- Americans (GFRNA), and normalized to 1.73m2 body surface area. The physician must decide which value applies to the patient. The MDRD study equation should only be used in individuals age 25 or older. It has not been validated for the following: pregnant women, patients with serious comorbid conditions, or on certain medications, or persons with extremes of body size, muscle mass, or nutritional status.  Calcium 09/21/2018 9.0  8.5 - 10.1 MG/DL Final     Review of Systems   Constitutional: Negative for malaise/fatigue and weight loss.    HENT: Negative for congestion and sore throat. Eyes: Negative for blurred vision and double vision. Respiratory: Negative for cough and shortness of breath. Cardiovascular: Negative for chest pain and leg swelling. Gastrointestinal: Negative for constipation and heartburn. +hemorrhoids   Genitourinary: Negative for frequency and urgency. Musculoskeletal: Positive for joint pain. Negative for back pain and myalgias. Skin:        +bump on R side   Neurological: Negative for dizziness and headaches. Psychiatric/Behavioral: Negative for depression. The patient is not nervous/anxious and does not have insomnia. Visit Vitals  BP (!) 146/82 (BP 1 Location: Left arm, BP Patient Position: Sitting)   Pulse 73   Temp 98.1 °F (36.7 °C) (Oral)   Resp 14   Ht 4' 9\" (1.448 m)   Wt 155 lb 3.2 oz (70.4 kg)   SpO2 98%   BMI 33.58 kg/m²     Physical Exam  Vitals signs and nursing note reviewed. Constitutional:       General: She is not in acute distress. Appearance: Normal appearance. She is well-developed and well-groomed. She is not diaphoretic. HENT:      Right Ear: External ear normal.      Left Ear: External ear normal.   Eyes:      General: No scleral icterus. Right eye: No discharge. Left eye: No discharge. Extraocular Movements: Extraocular movements intact. Neck:      Musculoskeletal: Normal range of motion and neck supple. Cardiovascular:      Rate and Rhythm: Normal rate and regular rhythm. Pulmonary:      Effort: Pulmonary effort is normal.      Breath sounds: Normal breath sounds. No wheezing. Musculoskeletal:        Arms:       Right lower leg: No edema. Left lower leg: No edema. Lymphadenopathy:      Cervical: No cervical adenopathy. Neurological:      Mental Status: She is alert and oriented to person, place, and time.    Psychiatric:         Mood and Affect: Mood and affect normal.         Behavior: Behavior normal.       ASSESSMENT and PLAN ICD-10-CM ICD-9-CM    1. Essential hypertension  P94 747.1 METABOLIC PANEL, COMPREHENSIVE      CBC W/O DIFF      LIPID PANEL    Ordered fasting labs for pt to complete today in office. Waiting on results. 2. External hemorrhoid  K64.4 455.3 REFERRAL TO GENERAL SURGERY    I provided a referral to general surgery for her hemorrhoids. 3. Need for hepatitis C screening test  Z11.59 V73.89 HEPATITIS C AB    Labs drawn in office today. 4. Arthritis  M19.90 716.90 I ordered a DEXA scan to evaluate for underlying bone density loss. 5. Postmenopausal bone loss  M81.0 733.01 DEXA BONE DENSITY STUDY AXIAL    I ordered a DEXA scan for health maintenance. 6. Eczema, unspecified type  L30.9 692.9 I let her know that managing this with OTC hydrocortisone cream is fine. She is not having any flare up right now. 7. Lipoma of back  D17.1 214.8 I explained that the bump on her R side is a lipoma and is nothing to be concerned about. This plan was reviewed with the patient and patient agrees. All questions were answered. This scribe documentation was reviewed by me and accurately reflects the examination and decisions made by me.

## 2020-12-23 NOTE — PROGRESS NOTES
Chief Complaint   Patient presents with    Other     hemorrhoids  since 2 1/2 years     1. Have you been to the ER, urgent care clinic since your last visit? Hospitalized since your last visit? No    2. Have you seen or consulted any other health care providers outside of the [address] [place] [address] since your last visit? Include any pap smears or colon screening.  No     Health Maintenance Due   Topic Date Due    Hepatitis C Screening  1950    DTaP/Tdap/Td series (1 - Tdap) 10/11/1969    Lipid Screen  10/11/1988    Shingrix Vaccine Age 50> (1 of 2) 10/11/1998    GLAUCOMA SCREENING Q2Y  10/11/2013    Bone Densitometry (Dexa) Screening  10/11/2013    Pneumococcal 65+ years (1 of 1 - PPSV23) 10/11/2013    Flu Vaccine (1) 09/01/2020

## 2020-12-28 ENCOUNTER — OFFICE VISIT (OUTPATIENT)
Dept: SURGERY | Age: 70
End: 2020-12-28
Payer: MEDICAID

## 2020-12-28 VITALS
TEMPERATURE: 98.1 F | WEIGHT: 153 LBS | HEART RATE: 82 BPM | RESPIRATION RATE: 16 BRPM | OXYGEN SATURATION: 98 % | HEIGHT: 57 IN | SYSTOLIC BLOOD PRESSURE: 107 MMHG | BODY MASS INDEX: 33.01 KG/M2 | DIASTOLIC BLOOD PRESSURE: 73 MMHG

## 2020-12-28 DIAGNOSIS — K64.2 GRADE III HEMORRHOIDS: Primary | ICD-10-CM

## 2020-12-28 PROBLEM — K64.9 HEMORRHOIDS: Status: ACTIVE | Noted: 2020-12-28

## 2020-12-28 PROCEDURE — 99201 PR OFFICE OUTPATIENT NEW 10 MINUTES: CPT | Performed by: SURGERY

## 2020-12-28 NOTE — PROGRESS NOTES
Jay Mistry is a 65 y.o. female who is referred by Dr. Nhung Zuñiga for further evaluation of hemorrhoids. Ms. Virgen Anderson tells me that she has been having problems with hemorrhoids for several years now. The hemorrhoids \"come out\" but she is able to \"push them back in. \" Associated bleeding. No constipation or diarrhea. Colonoscopy in February, 2020 was significant only for internal hemorrhoids. Next colonoscopy in ten years. She has otherwise been in her usual state of health. Past Medical History:   Diagnosis Date    Arthritis     GERD (gastroesophageal reflux disease)     Hemorrhoids 12/28/2020    Hypertension     Menopause     LMP-December, 1999     Past Surgical History:   Procedure Laterality Date    ABDOMEN SURGERY PROC UNLISTED      gallbladder    COLONOSCOPY N/A 2/4/2020    COLONOSCOPY performed by Billy Fernando [facility] at [facility] HX OTHER SURGICAL Left     bunionectomy    AGAPITO STEREO  BX BREAST LT 1ST LESION W/CLIP AND SPECIMEN Left long ago    Benign     History reviewed. No pertinent family history. Social History     Socioeconomic History    Marital status: LEGALLY      Spouse name: Not on file    Number of children: Not on file    Years of education: Not on file    Highest education level: Not on file   Tobacco Use    Smoking status: Former Smoker    Smokeless tobacco: Never Used   Substance and Sexual Activity    Alcohol use: No    Drug use: No     Review of systems negative except as noted. Review of Systems   Constitutional: Negative for chills and fever. Gastrointestinal: Negative for constipation and diarrhea. Physical Exam  Vitals signs reviewed. Constitutional:       General: She is not in acute distress. Appearance: Normal appearance. She is obese. HENT:      Head: Normocephalic and atraumatic. Eyes:      General: No scleral icterus. Cardiovascular:      Rate and Rhythm: Normal rate and regular rhythm.    Pulmonary:      Effort: Pulmonary effort is normal.      Breath sounds: Normal breath sounds. Abdominal:      General: There is no distension. Palpations: Abdomen is soft. Tenderness: There is no abdominal tenderness. Genitourinary:     Comments: No apparent perianal abscess or fistula-in-ano. No external hemorrhoids. On anoscopy an internal hemorrhoid was noted. No active bleeding. The hemorrhoid was reducible. Musculoskeletal: Normal range of motion. Neurological:      General: No focal deficit present. Mental Status: She is alert. ASSESSMENT and PLAN  In view of the findings on H and P, Ms. Paramjit Terry should benefit from examination under anesthesia, rigid sigmoidoscopy and hemorrhoidectomy. Discussed procedure with her including risks of bleeding and infection. She understands and wishes to proceed. I have tentatively scheduled Ms. Paramjit Terry for surgery on January 12, 2021 at [facility] and will see her back in the office postoperatively. She is agreeable to this plan of action and is most certainly free to contact the office should any questions or concerns arise. Follow up with Dr. Doylene Ormond as scheduled.        CC: Alberto Alexander MD

## 2020-12-28 NOTE — PROGRESS NOTES
1. Have you been to the ER, urgent care clinic since your last visit? Hospitalized since your last visit? No    2. Have you seen or consulted any other health care providers outside of the [address] [place] [address] since your last visit? Include any pap smears or colon screening.  No

## 2020-12-29 ENCOUNTER — HOSPITAL ENCOUNTER (OUTPATIENT)
Dept: PREADMISSION TESTING | Age: 70
Discharge: HOME | End: 2020-12-29
Payer: MEDICAID

## 2020-12-29 VITALS
SYSTOLIC BLOOD PRESSURE: 124 MMHG | HEIGHT: 57 IN | HEART RATE: 66 BPM | TEMPERATURE: 97.7 F | DIASTOLIC BLOOD PRESSURE: 79 MMHG | BODY MASS INDEX: 32.63 KG/M2 | WEIGHT: 151.24 LBS

## 2020-12-29 LAB
ATRIAL RATE: 66 BPM
CALCULATED P AXIS, ECG09: 60 DEGREES
CALCULATED R AXIS, ECG10: -5 DEGREES
CALCULATED T AXIS, ECG11: 39 DEGREES
DIAGNOSIS, 93000: NORMAL
P-R INTERVAL, ECG05: 92 MS
Q-T INTERVAL, ECG07: 438 MS
QRS DURATION, ECG06: 70 MS
QTC CALCULATION (BEZET), ECG08: 459 MS
VENTRICULAR RATE, ECG03: 66 BPM

## 2020-12-29 PROCEDURE — 93005 ELECTROCARDIOGRAM TRACING: CPT

## 2020-12-29 NOTE — PERIOP NOTES
PATIENT MADE AWARE OF NEED FOR COVID-19 TESTING WITHIN 96 HOURS OF SURGERY. PATIENT INSTRUCTED TO EXPECT A CALL TO SCHEDULE APPT FOR TEST. PATIENT INSTRUCTED TO SELF QUARANTINE BETWEEN TESTING AND ARRIVAL TIME DAY OF SURGERY. Patient verbalizes understanding of preoperative instructions:  Given skin prep chlorhexidine wipes-given written and verbal instructions on use. Pre-Operative Instructions        Patient given surgical site infection FAQs handout and hand hygiene tips sheet. Pre-operative instructions reviewed and patient verbalizes understanding of instructions. Patient has been given the opportunity to ask additional questions.

## 2020-12-30 RX ORDER — BUPIVACAINE HYDROCHLORIDE 2.5 MG/ML
30 INJECTION, SOLUTION EPIDURAL; INFILTRATION; INTRACAUDAL ONCE
Status: CANCELLED | OUTPATIENT
Start: 2020-12-30 | End: 2020-12-30

## 2020-12-30 RX ORDER — ACETAMINOPHEN 325 MG/1
1000 TABLET ORAL ONCE
Status: CANCELLED | OUTPATIENT
Start: 2020-12-30 | End: 2020-12-30

## 2021-01-06 ENCOUNTER — HOSPITAL ENCOUNTER (OUTPATIENT)
Dept: MAMMOGRAPHY | Age: 71
Discharge: HOME | End: 2021-01-06
Attending: INTERNAL MEDICINE
Payer: MEDICAID

## 2021-01-06 DIAGNOSIS — M81.0 POSTMENOPAUSAL BONE LOSS: ICD-10-CM

## 2021-01-06 PROCEDURE — 77080 DXA BONE DENSITY AXIAL: CPT

## 2021-01-08 ENCOUNTER — TRANSCRIBE ORDER (OUTPATIENT)
Dept: REGISTRATION | Age: 71
End: 2021-01-08

## 2021-01-08 ENCOUNTER — HOSPITAL ENCOUNTER (OUTPATIENT)
Dept: PREADMISSION TESTING | Age: 71
Discharge: HOME | End: 2021-01-08
Payer: MEDICAID

## 2021-01-08 DIAGNOSIS — Z01.812 PRE-PROCEDURE LAB EXAM: ICD-10-CM

## 2021-01-08 DIAGNOSIS — Z01.812 PRE-PROCEDURE LAB EXAM: Primary | ICD-10-CM

## 2021-01-08 PROCEDURE — 87635 SARS-COV-2 COVID-19 AMP PRB: CPT

## 2021-01-09 LAB — SARS-COV-2, COV2NT: NOT DETECTED

## 2021-01-10 NOTE — PROGRESS NOTES
Ronny Schreiber, hope you are enjoying your weekend. Please make a follow up appointment with me after your procedure to discuss Bone density result.

## 2021-01-12 ENCOUNTER — ANESTHESIA EVENT (OUTPATIENT)
Dept: SURGERY | Age: 71
End: 2021-01-12
Payer: MEDICARE

## 2021-01-12 ENCOUNTER — HOSPITAL ENCOUNTER (OUTPATIENT)
Age: 71
Setting detail: OUTPATIENT SURGERY
Discharge: HOME | End: 2021-01-12
Attending: SURGERY | Admitting: SURGERY
Payer: MEDICARE

## 2021-01-12 ENCOUNTER — ANESTHESIA (OUTPATIENT)
Dept: SURGERY | Age: 71
End: 2021-01-12
Payer: MEDICARE

## 2021-01-12 VITALS
TEMPERATURE: 96.3 F | WEIGHT: 151.24 LBS | BODY MASS INDEX: 32.63 KG/M2 | DIASTOLIC BLOOD PRESSURE: 54 MMHG | RESPIRATION RATE: 20 BRPM | HEART RATE: 81 BPM | HEIGHT: 57 IN | SYSTOLIC BLOOD PRESSURE: 85 MMHG | OXYGEN SATURATION: 98 %

## 2021-01-12 DIAGNOSIS — K64.2 GRADE III HEMORRHOIDS: Primary | ICD-10-CM

## 2021-01-12 PROCEDURE — 74011250637 HC RX REV CODE- 250/637

## 2021-01-12 PROCEDURE — 74011250637 HC RX REV CODE- 250/637: Performed by: SURGERY

## 2021-01-12 PROCEDURE — 76010000149 HC OR TIME 1 TO 1.5 HR: Performed by: SURGERY

## 2021-01-12 PROCEDURE — 74011000250 HC RX REV CODE- 250

## 2021-01-12 PROCEDURE — 74011000250 HC RX REV CODE- 250: Performed by: SURGERY

## 2021-01-12 PROCEDURE — 77030002888 HC SUT CHRMC J&J -A: Performed by: SURGERY

## 2021-01-12 PROCEDURE — 76210000000 HC OR PH I REC 2 TO 2.5 HR: Performed by: SURGERY

## 2021-01-12 PROCEDURE — 2709999900 HC NON-CHARGEABLE SUPPLY: Performed by: SURGERY

## 2021-01-12 PROCEDURE — 77030042556 HC PNCL CAUT -B: Performed by: SURGERY

## 2021-01-12 PROCEDURE — 74011250636 HC RX REV CODE- 250/636: Performed by: ANESTHESIOLOGY

## 2021-01-12 PROCEDURE — 74011000250 HC RX REV CODE- 250: Performed by: ANESTHESIOLOGY

## 2021-01-12 PROCEDURE — 88304 TISSUE EXAM BY PATHOLOGIST: CPT

## 2021-01-12 PROCEDURE — 74011000250 HC RX REV CODE- 250: Performed by: NURSE ANESTHETIST, CERTIFIED REGISTERED

## 2021-01-12 PROCEDURE — 74011250636 HC RX REV CODE- 250/636: Performed by: NURSE ANESTHETIST, CERTIFIED REGISTERED

## 2021-01-12 PROCEDURE — 77030031753 HC SHR ENDO COAG HARM J&J -E: Performed by: SURGERY

## 2021-01-12 PROCEDURE — 74011000258 HC RX REV CODE- 258: Performed by: SURGERY

## 2021-01-12 PROCEDURE — 77030040361 HC SLV COMPR DVT MDII -B: Performed by: SURGERY

## 2021-01-12 PROCEDURE — 77030019908 HC STETH ESOPH SIMS -A: Performed by: NURSE ANESTHETIST, CERTIFIED REGISTERED

## 2021-01-12 PROCEDURE — 76060000033 HC ANESTHESIA 1 TO 1.5 HR: Performed by: SURGERY

## 2021-01-12 PROCEDURE — P9045 ALBUMIN (HUMAN), 5%, 250 ML: HCPCS | Performed by: NURSE ANESTHETIST, CERTIFIED REGISTERED

## 2021-01-12 PROCEDURE — 77030026438 HC STYL ET INTUB CARD -A: Performed by: NURSE ANESTHETIST, CERTIFIED REGISTERED

## 2021-01-12 PROCEDURE — 46255 REMOVE INT/EXT HEM 1 GROUP: CPT | Performed by: SURGERY

## 2021-01-12 PROCEDURE — 77030040922 HC BLNKT HYPOTHRM STRY -A

## 2021-01-12 PROCEDURE — 77030008684 HC TU ET CUF COVD -B: Performed by: NURSE ANESTHETIST, CERTIFIED REGISTERED

## 2021-01-12 PROCEDURE — 2709999900 HC NON-CHARGEABLE SUPPLY

## 2021-01-12 RX ORDER — SODIUM CHLORIDE 9 MG/ML
25 INJECTION, SOLUTION INTRAVENOUS CONTINUOUS
Status: DISCONTINUED | OUTPATIENT
Start: 2021-01-12 | End: 2021-01-12 | Stop reason: HOSPADM

## 2021-01-12 RX ORDER — SODIUM CHLORIDE 0.9 % (FLUSH) 0.9 %
5-40 SYRINGE (ML) INJECTION EVERY 8 HOURS
Status: DISCONTINUED | OUTPATIENT
Start: 2021-01-12 | End: 2021-01-12 | Stop reason: HOSPADM

## 2021-01-12 RX ORDER — EPHEDRINE SULFATE/0.9% NACL/PF 50 MG/5 ML
SYRINGE (ML) INTRAVENOUS
Status: COMPLETED
Start: 2021-01-12 | End: 2021-01-12

## 2021-01-12 RX ORDER — EPHEDRINE SULFATE/0.9% NACL/PF 50 MG/5 ML
SYRINGE (ML) INTRAVENOUS AS NEEDED
Status: DISCONTINUED | OUTPATIENT
Start: 2021-01-12 | End: 2021-01-12 | Stop reason: HOSPADM

## 2021-01-12 RX ORDER — ROPIVACAINE HYDROCHLORIDE 5 MG/ML
30 INJECTION, SOLUTION EPIDURAL; INFILTRATION; PERINEURAL ONCE
Status: DISCONTINUED | OUTPATIENT
Start: 2021-01-12 | End: 2021-01-12 | Stop reason: HOSPADM

## 2021-01-12 RX ORDER — LIDOCAINE HYDROCHLORIDE 20 MG/ML
INJECTION, SOLUTION EPIDURAL; INFILTRATION; INTRACAUDAL; PERINEURAL AS NEEDED
Status: DISCONTINUED | OUTPATIENT
Start: 2021-01-12 | End: 2021-01-12 | Stop reason: HOSPADM

## 2021-01-12 RX ORDER — OXYCODONE HYDROCHLORIDE 5 MG/1
5 TABLET ORAL
Qty: 10 TAB | Refills: 0 | Status: SHIPPED | OUTPATIENT
Start: 2021-01-12 | End: 2021-01-15 | Stop reason: ALTCHOICE

## 2021-01-12 RX ORDER — MORPHINE SULFATE 10 MG/ML
2 INJECTION, SOLUTION INTRAMUSCULAR; INTRAVENOUS
Status: DISCONTINUED | OUTPATIENT
Start: 2021-01-12 | End: 2021-01-12 | Stop reason: HOSPADM

## 2021-01-12 RX ORDER — ROCURONIUM BROMIDE 10 MG/ML
INJECTION, SOLUTION INTRAVENOUS AS NEEDED
Status: DISCONTINUED | OUTPATIENT
Start: 2021-01-12 | End: 2021-01-12 | Stop reason: HOSPADM

## 2021-01-12 RX ORDER — PROPOFOL 10 MG/ML
INJECTION, EMULSION INTRAVENOUS AS NEEDED
Status: DISCONTINUED | OUTPATIENT
Start: 2021-01-12 | End: 2021-01-12 | Stop reason: HOSPADM

## 2021-01-12 RX ORDER — ACETAMINOPHEN 500 MG
1000 TABLET ORAL ONCE
Status: COMPLETED | OUTPATIENT
Start: 2021-01-12 | End: 2021-01-12

## 2021-01-12 RX ORDER — OXYCODONE HYDROCHLORIDE 5 MG/1
TABLET ORAL
Status: COMPLETED
Start: 2021-01-12 | End: 2021-01-12

## 2021-01-12 RX ORDER — ONDANSETRON 2 MG/ML
4 INJECTION INTRAMUSCULAR; INTRAVENOUS AS NEEDED
Status: DISCONTINUED | OUTPATIENT
Start: 2021-01-12 | End: 2021-01-12 | Stop reason: HOSPADM

## 2021-01-12 RX ORDER — ACETAMINOPHEN 325 MG/1
650 TABLET ORAL ONCE
Status: DISCONTINUED | OUTPATIENT
Start: 2021-01-12 | End: 2021-01-12 | Stop reason: SDUPTHER

## 2021-01-12 RX ORDER — MIDAZOLAM HYDROCHLORIDE 1 MG/ML
1 INJECTION, SOLUTION INTRAMUSCULAR; INTRAVENOUS AS NEEDED
Status: DISCONTINUED | OUTPATIENT
Start: 2021-01-12 | End: 2021-01-12 | Stop reason: HOSPADM

## 2021-01-12 RX ORDER — FENTANYL CITRATE 50 UG/ML
50 INJECTION, SOLUTION INTRAMUSCULAR; INTRAVENOUS AS NEEDED
Status: DISCONTINUED | OUTPATIENT
Start: 2021-01-12 | End: 2021-01-12 | Stop reason: HOSPADM

## 2021-01-12 RX ORDER — DIPHENHYDRAMINE HYDROCHLORIDE 50 MG/ML
12.5 INJECTION, SOLUTION INTRAMUSCULAR; INTRAVENOUS AS NEEDED
Status: DISCONTINUED | OUTPATIENT
Start: 2021-01-12 | End: 2021-01-12 | Stop reason: HOSPADM

## 2021-01-12 RX ORDER — ALBUMIN HUMAN 50 G/1000ML
SOLUTION INTRAVENOUS AS NEEDED
Status: DISCONTINUED | OUTPATIENT
Start: 2021-01-12 | End: 2021-01-12 | Stop reason: HOSPADM

## 2021-01-12 RX ORDER — SODIUM CHLORIDE, SODIUM LACTATE, POTASSIUM CHLORIDE, CALCIUM CHLORIDE 600; 310; 30; 20 MG/100ML; MG/100ML; MG/100ML; MG/100ML
125 INJECTION, SOLUTION INTRAVENOUS CONTINUOUS
Status: DISCONTINUED | OUTPATIENT
Start: 2021-01-12 | End: 2021-01-12 | Stop reason: HOSPADM

## 2021-01-12 RX ORDER — GLYCOPYRROLATE 0.2 MG/ML
INJECTION INTRAMUSCULAR; INTRAVENOUS AS NEEDED
Status: DISCONTINUED | OUTPATIENT
Start: 2021-01-12 | End: 2021-01-12 | Stop reason: HOSPADM

## 2021-01-12 RX ORDER — EPHEDRINE SULFATE/0.9% NACL/PF 50 MG/5 ML
10 SYRINGE (ML) INTRAVENOUS ONCE
Status: COMPLETED | OUTPATIENT
Start: 2021-01-12 | End: 2021-01-12

## 2021-01-12 RX ORDER — HYDROMORPHONE HYDROCHLORIDE 1 MG/ML
0.2 INJECTION, SOLUTION INTRAMUSCULAR; INTRAVENOUS; SUBCUTANEOUS
Status: DISCONTINUED | OUTPATIENT
Start: 2021-01-12 | End: 2021-01-12 | Stop reason: HOSPADM

## 2021-01-12 RX ORDER — DEXMEDETOMIDINE HYDROCHLORIDE 100 UG/ML
INJECTION, SOLUTION INTRAVENOUS AS NEEDED
Status: DISCONTINUED | OUTPATIENT
Start: 2021-01-12 | End: 2021-01-12 | Stop reason: HOSPADM

## 2021-01-12 RX ORDER — BUPIVACAINE HYDROCHLORIDE 2.5 MG/ML
30 INJECTION, SOLUTION EPIDURAL; INFILTRATION; INTRACAUDAL ONCE
Status: DISCONTINUED | OUTPATIENT
Start: 2021-01-12 | End: 2021-01-12 | Stop reason: HOSPADM

## 2021-01-12 RX ORDER — FENTANYL CITRATE 50 UG/ML
25 INJECTION, SOLUTION INTRAMUSCULAR; INTRAVENOUS
Status: DISCONTINUED | OUTPATIENT
Start: 2021-01-12 | End: 2021-01-12 | Stop reason: HOSPADM

## 2021-01-12 RX ORDER — LIDOCAINE HYDROCHLORIDE 10 MG/ML
0.1 INJECTION, SOLUTION EPIDURAL; INFILTRATION; INTRACAUDAL; PERINEURAL AS NEEDED
Status: DISCONTINUED | OUTPATIENT
Start: 2021-01-12 | End: 2021-01-12 | Stop reason: HOSPADM

## 2021-01-12 RX ORDER — MIDAZOLAM HYDROCHLORIDE 1 MG/ML
0.5 INJECTION, SOLUTION INTRAMUSCULAR; INTRAVENOUS
Status: DISCONTINUED | OUTPATIENT
Start: 2021-01-12 | End: 2021-01-12 | Stop reason: HOSPADM

## 2021-01-12 RX ORDER — ONDANSETRON 2 MG/ML
INJECTION INTRAMUSCULAR; INTRAVENOUS AS NEEDED
Status: DISCONTINUED | OUTPATIENT
Start: 2021-01-12 | End: 2021-01-12 | Stop reason: HOSPADM

## 2021-01-12 RX ORDER — FENTANYL CITRATE 50 UG/ML
INJECTION, SOLUTION INTRAMUSCULAR; INTRAVENOUS AS NEEDED
Status: DISCONTINUED | OUTPATIENT
Start: 2021-01-12 | End: 2021-01-12 | Stop reason: HOSPADM

## 2021-01-12 RX ORDER — BUPIVACAINE HYDROCHLORIDE AND EPINEPHRINE 2.5; 5 MG/ML; UG/ML
30 INJECTION, SOLUTION EPIDURAL; INFILTRATION; INTRACAUDAL; PERINEURAL ONCE
Status: CANCELLED | OUTPATIENT
Start: 2021-01-12 | End: 2021-01-12

## 2021-01-12 RX ORDER — EPHEDRINE SULFATE/0.9% NACL/PF 50 MG/5 ML
25 SYRINGE (ML) INTRAVENOUS ONCE
Status: COMPLETED | OUTPATIENT
Start: 2021-01-12 | End: 2021-01-12

## 2021-01-12 RX ORDER — SUCCINYLCHOLINE CHLORIDE 20 MG/ML
INJECTION INTRAMUSCULAR; INTRAVENOUS AS NEEDED
Status: DISCONTINUED | OUTPATIENT
Start: 2021-01-12 | End: 2021-01-12 | Stop reason: HOSPADM

## 2021-01-12 RX ORDER — SODIUM CHLORIDE 0.9 % (FLUSH) 0.9 %
5-40 SYRINGE (ML) INJECTION AS NEEDED
Status: DISCONTINUED | OUTPATIENT
Start: 2021-01-12 | End: 2021-01-12 | Stop reason: HOSPADM

## 2021-01-12 RX ORDER — PHENYLEPHRINE HCL IN 0.9% NACL 0.4MG/10ML
SYRINGE (ML) INTRAVENOUS AS NEEDED
Status: DISCONTINUED | OUTPATIENT
Start: 2021-01-12 | End: 2021-01-12 | Stop reason: HOSPADM

## 2021-01-12 RX ORDER — SODIUM CHLORIDE, SODIUM LACTATE, POTASSIUM CHLORIDE, CALCIUM CHLORIDE 600; 310; 30; 20 MG/100ML; MG/100ML; MG/100ML; MG/100ML
75 INJECTION, SOLUTION INTRAVENOUS CONTINUOUS
Status: DISCONTINUED | OUTPATIENT
Start: 2021-01-12 | End: 2021-01-12 | Stop reason: HOSPADM

## 2021-01-12 RX ORDER — DEXAMETHASONE SODIUM PHOSPHATE 4 MG/ML
INJECTION, SOLUTION INTRA-ARTICULAR; INTRALESIONAL; INTRAMUSCULAR; INTRAVENOUS; SOFT TISSUE AS NEEDED
Status: DISCONTINUED | OUTPATIENT
Start: 2021-01-12 | End: 2021-01-12 | Stop reason: HOSPADM

## 2021-01-12 RX ORDER — OXYCODONE HYDROCHLORIDE 5 MG/1
5 TABLET ORAL
Status: COMPLETED | OUTPATIENT
Start: 2021-01-12 | End: 2021-01-12

## 2021-01-12 RX ORDER — EPHEDRINE SULFATE/0.9% NACL/PF 50 MG/5 ML
5 SYRINGE (ML) INTRAVENOUS ONCE
Status: COMPLETED | OUTPATIENT
Start: 2021-01-12 | End: 2021-01-12

## 2021-01-12 RX ORDER — PHENYLEPHRINE HCL IN 0.9% NACL 0.4MG/10ML
SYRINGE (ML) INTRAVENOUS
Status: DISCONTINUED | OUTPATIENT
Start: 2021-01-12 | End: 2021-01-12 | Stop reason: HOSPADM

## 2021-01-12 RX ADMIN — Medication 80 MCG: at 09:21

## 2021-01-12 RX ADMIN — FENTANYL CITRATE 25 MCG: 50 INJECTION, SOLUTION INTRAMUSCULAR; INTRAVENOUS at 09:15

## 2021-01-12 RX ADMIN — SODIUM CHLORIDE, POTASSIUM CHLORIDE, SODIUM LACTATE AND CALCIUM CHLORIDE 500 ML: 600; 310; 30; 20 INJECTION, SOLUTION INTRAVENOUS at 11:10

## 2021-01-12 RX ADMIN — Medication 10 MG: at 11:34

## 2021-01-12 RX ADMIN — Medication 5 MG: at 11:10

## 2021-01-12 RX ADMIN — ONDANSETRON HYDROCHLORIDE 4 MG: 2 INJECTION, SOLUTION INTRAMUSCULAR; INTRAVENOUS at 09:26

## 2021-01-12 RX ADMIN — OXYCODONE HYDROCHLORIDE 5 MG: 5 TABLET ORAL at 10:51

## 2021-01-12 RX ADMIN — Medication 40 MCG/MIN: at 09:27

## 2021-01-12 RX ADMIN — LIDOCAINE HYDROCHLORIDE 50 MG: 20 INJECTION, SOLUTION EPIDURAL; INFILTRATION; INTRACAUDAL; PERINEURAL at 09:15

## 2021-01-12 RX ADMIN — Medication 120 MCG: at 09:19

## 2021-01-12 RX ADMIN — Medication 40 MCG: at 09:14

## 2021-01-12 RX ADMIN — CEFOTETAN DISODIUM 2 G: 2 INJECTION, POWDER, FOR SOLUTION INTRAMUSCULAR; INTRAVENOUS at 09:30

## 2021-01-12 RX ADMIN — GLYCOPYRROLATE 0.2 MG: 0.2 INJECTION, SOLUTION INTRAMUSCULAR; INTRAVENOUS at 09:42

## 2021-01-12 RX ADMIN — Medication 80 MCG: at 10:11

## 2021-01-12 RX ADMIN — Medication 80 MCG: at 09:26

## 2021-01-12 RX ADMIN — DEXMEDETOMIDINE HYDROCHLORIDE 5 MCG: 100 INJECTION, SOLUTION, CONCENTRATE INTRAVENOUS at 09:50

## 2021-01-12 RX ADMIN — Medication 40 MCG: at 09:15

## 2021-01-12 RX ADMIN — DEXMEDETOMIDINE HYDROCHLORIDE 5 MCG: 100 INJECTION, SOLUTION, CONCENTRATE INTRAVENOUS at 09:53

## 2021-01-12 RX ADMIN — FENTANYL CITRATE 25 MCG: 50 INJECTION, SOLUTION INTRAMUSCULAR; INTRAVENOUS at 09:57

## 2021-01-12 RX ADMIN — PROPOFOL 20 MG: 10 INJECTION, EMULSION INTRAVENOUS at 09:16

## 2021-01-12 RX ADMIN — SUCCINYLCHOLINE CHLORIDE 120 MG: 20 INJECTION, SOLUTION INTRAMUSCULAR; INTRAVENOUS at 09:16

## 2021-01-12 RX ADMIN — PROPOFOL 100 MG: 10 INJECTION, EMULSION INTRAVENOUS at 09:15

## 2021-01-12 RX ADMIN — Medication 40 MCG: at 09:13

## 2021-01-12 RX ADMIN — ONDANSETRON 4 MG: 2 INJECTION INTRAMUSCULAR; INTRAVENOUS at 10:50

## 2021-01-12 RX ADMIN — ALBUMIN (HUMAN) 250 ML: 12.5 INJECTION, SOLUTION INTRAVENOUS at 09:40

## 2021-01-12 RX ADMIN — OXYCODONE 5 MG: 5 TABLET ORAL at 10:51

## 2021-01-12 RX ADMIN — Medication 10 MG: at 09:21

## 2021-01-12 RX ADMIN — SODIUM CHLORIDE, POTASSIUM CHLORIDE, SODIUM LACTATE AND CALCIUM CHLORIDE 125 ML/HR: 600; 310; 30; 20 INJECTION, SOLUTION INTRAVENOUS at 08:56

## 2021-01-12 RX ADMIN — ACETAMINOPHEN 1000 MG: 500 TABLET ORAL at 08:55

## 2021-01-12 RX ADMIN — ROCURONIUM BROMIDE 10 MG: 10 SOLUTION INTRAVENOUS at 09:15

## 2021-01-12 RX ADMIN — Medication 25 MG: at 11:40

## 2021-01-12 RX ADMIN — DEXAMETHASONE SODIUM PHOSPHATE 4 MG: 4 INJECTION, SOLUTION INTRAMUSCULAR; INTRAVENOUS at 09:26

## 2021-01-12 NOTE — H&P
Date of Surgery Update:  Lyla Dutton was seen and examined. History and physical has been reviewed. The patient has been examined. There have been no significant clinical changes since the completion of the originally dated History and Physical.    Signed By: Fausto Franks MD     2021 9:00 AM         Please note from the office and include the additional information below:    Past Medical History  Past Medical History:   Diagnosis Date    Arthritis     GERD (gastroesophageal reflux disease)     Hemorrhoids 2020    Hypertension     Menopause     LMP-        Past Surgical History  Past Surgical History:   Procedure Laterality Date    COLONOSCOPY N/A 2020    COLONOSCOPY performed by Stevie [facility] at [facility] HX OTHER SURGICAL Bilateral     bunionectomy    AGAPITO STEREO  BX BREAST LT 1ST LESION W/CLIP AND SPECIMEN Left long ago    Benign    NM ABDOMEN SURGERY [address] UNLISTED      gallbladder        Social History  The patient Lyla Dutton  reports that she has never smoked. She has never used smokeless tobacco. She reports that she does not drink alcohol or use drugs.      Family History  Family History   Problem Relation Age of Onset   [address] Hypertension Mother     Heart Disease Father          OF HEART ATTACK IN EARLY 60'S    Heart Disease Sister     Hypertension Sister     Hypertension Brother     Alcohol abuse Sister     Alcohol abuse Sister     Gout Sister     Stroke Sister     Heart Disease Sister     Other Sister         ADDICTION TO NARCOTICS    No Known Problems Sister     Stroke Sister     Heart Disease Sister     Hypertension Sister     No Known Problems Sister     Cancer Sister         OVARIAN    Anesth Problems Neg Hx

## 2021-01-12 NOTE — ANESTHESIA POSTPROCEDURE EVALUATION
Post-Anesthesia Evaluation and Assessment    Patient: Yvonne Parks MRN: [id number]  SSN: [id number]    YOB: 1950  Age: 65 y.o. Sex: female      I have evaluated the patient and they are stable and ready for discharge from the PACU. Cardiovascular Function/Vital Signs  Visit Vitals  /61   Pulse 95   Temp (!) 35.7 °C (96.3 °F)   Resp 16   Ht 4' 9\" (1.448 m)   Wt 68.6 kg (151 lb 3.8 oz)   SpO2 100%   BMI 32.73 kg/m²       Patient is status post General anesthesia for Procedure(s):  RECTAL EXAM UNDER ANESTHESIA, RIGID SIGMOIDOSCOPY, HEMORRHOIDECTOY. Nausea/Vomiting: None    Postoperative hydration reviewed and adequate. Pain:  Pain Scale 1: Numeric (0 - 10) (01/12/21 0846)  Pain Intensity 1: 0 (01/12/21 0846)   Managed    Neurological Status:   Neuro (WDL): Within Defined Limits (01/12/21 0852)   At baseline    Mental Status, Level of Consciousness: Alert and  oriented to person, place, and time    Pulmonary Status:   O2 Device: CO2 nasal cannula (01/12/21 1023)   Adequate oxygenation and airway patent    Complications related to anesthesia: None    Post-anesthesia assessment completed. No concerns    Signed By: Vivien Hudson MD     January 12, 2021              Procedure(s):  RECTAL EXAM UNDER ANESTHESIA, RIGID SIGMOIDOSCOPY, HEMORRHOIDECTOY. general    <BSHSIANPOST>    INITIAL Post-op Vital signs:   Vitals Value Taken Time   BP 87/49 01/12/21 1030   Temp 35.7 °C (96.3 °F) 01/12/21 1023   Pulse 88 01/12/21 1032   Resp 18 01/12/21 1032   SpO2 100 % 01/12/21 1032   Vitals shown include unvalidated device data.

## 2021-01-12 NOTE — DISCHARGE INSTRUCTIONS
Patient Discharge Instructions    Keisha Oliver / [id number] : 1950    Admitted 2021 Discharged: 2021       · It is important that you take the medication exactly as they are prescribed. · Keep your medication in the bottles provided by the pharmacist and keep a list of the medication names, dosages, and times to be taken in your wallet. · Do not take other medications without consulting your doctor. What to do at Home    Recommended diet: Regular. Recommended activity: No Restrictions. No Driving While Taking Oxycodone. Tylenol 1000mg every 6 hours as needed for pain. Oxycodone as needed for severe pain. May Take Shower or [place] Roxo after removing dressing. Can remove dressing later today. If you experience any of the following symptoms Fevers, Chills, Nausea, Vomitting, Redness or Drainage at Surgical Site(s) or Any Other Questions or Concerns Please Call -  [phone]. Follow-up with Dr. Jeanette Reina in 10-14 days. Information obtained by :  I understand that if any problems occur once I am at home I am to contact my physician. I understand and acknowledge receipt of the instructions indicated above.                                                                                                                                            Physician's or R.N.'s Signature                                                                  Date/Time                                                                                                                                              Patient or Representative Signature                                                          Date/Time        ______________________________________________________________________    Anesthesia Discharge Instructions    After general anesthesia or intervenous sedation, for 24 hours or while taking prescription Narcotics:  · Limit your activities  · Do not drive or operate hazardous machinery  · If you have not urinated within 8 hours after discharge, please contact your surgeon on call. · Do not make important personal or business decisions  · Do not drink alcoholic beverages    Report the following to your surgeon:  · Excessive pain, swelling, redness or odor of or around the surgical area  · Temperature over 100.5 degrees  · Nausea and vomiting lasting longer than 4 hours or if unable to take medication  · Any signs of decreased circulation or nerve impairment to extremity:  Change in color, persistent numbness, tingling, coldness or increased pain.   · Any questions      ** You were given Roxicodone 5mg in the recovery room at 10:51 am.    ** You were given Tylenol 1,000 mg at 8:55 am.

## 2021-01-12 NOTE — OP NOTES
[facility]  OPERATIVE REPORT    Name:  Louise Alfaro  MR#:  [id number]  :  1950  ACCOUNT #:  [de-identified]  DATE OF SERVICE:  2021      PREOPERATIVE DIAGNOSIS:  Hemorrhoids. POSTOPERATIVE DIAGNOSIS:  Hemorrhoids. PROCEDURES PERFORMED:  1. Examination under anesthesia. 2.  Rigid sigmoidoscopy. 3.  Hemorrhoidectomy. SURGEON:  Karolina Morejon MD    ASSISTANT:  Antony Gamble RN    ANESTHESIA:  General endotracheal.    COMPLICATIONS:  None. SPECIMENS REMOVED:  Hemorrhoid to Pathology. ESTIMATED BLOOD LOSS:  Approximately 10 mL. IV FLUIDS:  Crystalloid 500 mL, albumin 250 mL. DRAINS:  None. INDICATIONS FOR SURGERY:  The patient is a 78-year-old female with a symptomatic internal hemorrhoid. Ms. Leighton Ferrer is brought to the operating room at this time for examination under anesthesia, rigid sigmoidoscopy and hemorrhoidectomy. The risks of the procedure, including but not limited to, infection, bleeding and recurrence were discussed in detail with the patient. Ms. Leighton Ferrer understood and wished to proceed. PROCEDURE:  After consent was obtained, the patient was brought to the operating room where she was intubated while in the supine position on the stretcher. Following the induction of an adequate level of general anesthesia via endotracheal tube, compression devices were placed on both lower extremities. The skin was then placed in the prone position on the operating room table. After ensuring that all pressure points were well padded, the buttocks were taped apart, prepped with Betadine and draped as a sterile field. On examination, there did not appear to be a perianal abscess or fistula in ano. Furthermore, no external hemorrhoids were identified. Digital rectal examination was performed and no suspicious mass lesions were identified. The anal speculum was then brought on the field and carefully inserted.   A nonbleeding internal hemorrhoid was identified. There were no other mucosal lesions. The rigid sigmoidoscope was then brought on the field and carefully advanced per rectum to approximately 10 cm. As the scope was withdrawn, the mucosa was inspected. The mucosa appeared grossly normal.  No suspicious-appearing mass lesions were identified. The rigid sigmoidoscope was removed and the anal speculum inserted again. The internal hemorrhoid was grasped and excised with the Harmonic scalpel. The specimen was passed off the field and submitted for histopathologic evaluation. The mucosa was then closed with a running locked 3-0 chromic suture. Pressure was held and the suture line was inspected and found to be intact and hemostatic. Local anesthetic was infiltrated. A piece of Gelfoam was placed over the suture line and a dry dressing applied. The patient was then returned to the supine position on the stretcher. She was awakened from her general anesthetic and extubated in the operating room. The patient was brought to the recovery room in stable condition, having tolerated the procedure well. At the conclusion of the procedure, all sponge counts, instrument counts and needle counts were reported as correct x2. Linette Ya MD      [id number]  D:  01/12/2021 10:24  T:  01/12/2021 13:55  JOB #:  [id number]  CC:   Ganga House MD

## 2021-01-12 NOTE — BRIEF OP NOTE
Brief Postoperative Note    Patient: Star Dias  YOB: 1950  MRN: [id number]    Date of Procedure: 1/12/2021     Pre-Op Diagnosis:  Hemorrhoids. Post-Op Diagnosis:  Same. Procedure(s):   Examination Under Anesthesia. Rigid Sigmoidoscopy. Hemorrhoidectomy. Surgeon(s):  Imani Plata MD    Surgical Assistant:  Bertha Sinclair RN    Anesthesia: General     Estimated Blood Loss (mL): Approximately 10 ml. Complications: None    Specimens:   ID Type Source Tests Collected by Time Destination   1 : Hemorrhoid Fresh Other                  Imani Plata MD 1/12/2021 2059 Pathology        Implants: * No implants in log *    Drains: * No LDAs found *    Findings: No perianal abscess. No fistula-in-ano. No external hemorrhoids. Internal hemorrhoid. Rigid sigmoidoscopy to approximately 10 cm.     Electronically Signed by Asmita Couch MD on 1/12/2021 at 10:17 AM

## 2021-01-12 NOTE — ANESTHESIA PREPROCEDURE EVALUATION
Anesthetic History   No history of anesthetic complications            Review of Systems / Medical History  Patient summary reviewed and pertinent labs reviewed    Pulmonary                   Neuro/Psych   Within defined limits           Cardiovascular    Hypertension              Exercise tolerance: <4 METS     GI/Hepatic/Renal     GERD           Endo/Other        Arthritis     Other Findings              Physical Exam    Airway  Mallampati: II  TM Distance: 4 - 6 cm  Neck ROM: normal range of motion   Mouth opening: Normal     Cardiovascular    Rhythm: regular  Rate: normal         Dental    Dentition: Lower partial plate and Upper partial plate     Pulmonary  Breath sounds clear to auscultation               Abdominal  GI exam deferred       Other Findings            Anesthetic Plan    ASA: 2  Anesthesia type: general          Induction: Intravenous  Anesthetic plan and risks discussed with: Patient

## 2021-01-14 ENCOUNTER — TRANSCRIBE ORDER (OUTPATIENT)
Dept: FAMILY MEDICINE CLINIC | Age: 71
End: 2021-01-14

## 2021-01-14 ENCOUNTER — TELEPHONE (OUTPATIENT)
Dept: SURGERY | Age: 71
End: 2021-01-14

## 2021-01-14 NOTE — TELEPHONE ENCOUNTER
Patient identified with two patient identifiers. Patient questioning if she has packing states she hadn't moved her bowels because she thought she had packing s/p hemorrhoidectomy. Patient informed she does not have packing she has dissolvable sutures. Patient informed she needs to start over the counter stool softener now to help with moving bowels. Patient informed moving bowels will be painful at first due to incision and sutures patient instructed to performs sitz baths and or soaks as needed. She has no current complaints of pain, purulent drainage, fever, or increased swelling. Patient expressed understanding will return call if any other questions or concerns.

## 2021-01-15 ENCOUNTER — OFFICE VISIT (OUTPATIENT)
Dept: PRIMARY CARE CLINIC | Age: 71
End: 2021-01-15
Payer: MEDICARE

## 2021-01-15 VITALS
DIASTOLIC BLOOD PRESSURE: 75 MMHG | HEIGHT: 57 IN | BODY MASS INDEX: 32.97 KG/M2 | TEMPERATURE: 97.1 F | RESPIRATION RATE: 18 BRPM | HEART RATE: 70 BPM | SYSTOLIC BLOOD PRESSURE: 114 MMHG | OXYGEN SATURATION: 100 % | WEIGHT: 152.8 LBS

## 2021-01-15 DIAGNOSIS — Z98.890 S/P HEMORRHOIDECTOMY: ICD-10-CM

## 2021-01-15 DIAGNOSIS — Z87.19 S/P HEMORRHOIDECTOMY: ICD-10-CM

## 2021-01-15 DIAGNOSIS — M85.89 OSTEOPENIA OF MULTIPLE SITES: ICD-10-CM

## 2021-01-15 DIAGNOSIS — E66.9 OBESITY (BMI 30.0-34.9): ICD-10-CM

## 2021-01-15 DIAGNOSIS — E78.2 MIXED HYPERLIPIDEMIA: ICD-10-CM

## 2021-01-15 DIAGNOSIS — R79.89 ELEVATED SERUM CREATININE: ICD-10-CM

## 2021-01-15 DIAGNOSIS — I10 ESSENTIAL HYPERTENSION: Primary | ICD-10-CM

## 2021-01-15 PROCEDURE — 1090F PRES/ABSN URINE INCON ASSESS: CPT | Performed by: INTERNAL MEDICINE

## 2021-01-15 PROCEDURE — G8752 SYS BP LESS 140: HCPCS | Performed by: INTERNAL MEDICINE

## 2021-01-15 PROCEDURE — 99214 OFFICE O/P EST MOD 30 MIN: CPT | Performed by: INTERNAL MEDICINE

## 2021-01-15 PROCEDURE — G8427 DOCREV CUR MEDS BY ELIG CLIN: HCPCS | Performed by: INTERNAL MEDICINE

## 2021-01-15 PROCEDURE — G9899 SCRN MAM PERF RSLTS DOC: HCPCS | Performed by: INTERNAL MEDICINE

## 2021-01-15 PROCEDURE — G8417 CALC BMI ABV UP PARAM F/U: HCPCS | Performed by: INTERNAL MEDICINE

## 2021-01-15 PROCEDURE — G8510 SCR DEP NEG, NO PLAN REQD: HCPCS | Performed by: INTERNAL MEDICINE

## 2021-01-15 PROCEDURE — 1101F PT FALLS ASSESS-DOCD LE1/YR: CPT | Performed by: INTERNAL MEDICINE

## 2021-01-15 PROCEDURE — 3017F COLORECTAL CA SCREEN DOC REV: CPT | Performed by: INTERNAL MEDICINE

## 2021-01-15 PROCEDURE — G8754 DIAS BP LESS 90: HCPCS | Performed by: INTERNAL MEDICINE

## 2021-01-15 PROCEDURE — G8536 NO DOC ELDER MAL SCRN: HCPCS | Performed by: INTERNAL MEDICINE

## 2021-01-15 PROCEDURE — G8399 PT W/DXA RESULTS DOCUMENT: HCPCS | Performed by: INTERNAL MEDICINE

## 2021-01-15 RX ORDER — LISINOPRIL AND HYDROCHLOROTHIAZIDE 10; 12.5 MG/1; MG/1
1 TABLET ORAL DAILY
Qty: 90 TAB | Refills: 0 | Status: SHIPPED | OUTPATIENT
Start: 2021-01-15 | End: 2021-04-15

## 2021-01-15 NOTE — PROGRESS NOTES
Written by James Nowak, as dictated by Dr. Delmis Brown MD.    Julee Naylor (: 1950) is a 65 y.o. female, established patient, here for evaluation of the following chief complaint(s):  Follow-up (disuss lab work/post surgery )       SUBJECTIVE/OBJECTIVE:  HPI  Pt presents today to follow up on her rectal exam under anesthesia done with Dr. Liza Valverde. She had this rigid sigmoidoscopy and hemorrhoidectomy,  done on 21 and is feeling much better now that she had it remove. Her labs drawn on 20 showed elevated BUN (22) and creatinine (1.31) and depressed GFR est AA (48) and GFR est non-AA (40). She notes that she drinks plenty of water. She is currently taking lisinopril-HCTZ 20-25 mg and amlodipine 10 mg every day. Her lipid panel from 20 showed elevated total cholesterol (234) and LDL (153.8). She would prefer to work on diet and exercise before taking a medication for this. She has been getting severe cramps in her hands and abdomen. She inquires what she can take to help with these. Patient Active Problem List   Diagnosis Code    Hallux rigidus of left foot M20.22    Hallux valgus (acquired), right foot M20.11    Eczema L30.9    Essential hypertension I10    Arthritis M19.90    Hemorrhoids K64.9        Current Outpatient Medications on File Prior to Visit   Medication Sig Dispense Refill    amLODIPine (NORVASC) 10 mg tablet Take 10 mg by mouth daily.  oxyCODONE IR (ROXICODONE) 5 mg immediate release tablet Take 1 Tab by mouth every four (4) hours as needed for Pain for up to 3 days. Max Daily Amount: 30 mg. 10 Tab 0    [DISCONTINUED] lisinopril-hydroCHLOROthiazide (PRINZIDE, ZESTORETIC) 20-25 mg per tablet        No current facility-administered medications on file prior to visit.         Allergies   Allergen Reactions    Aspirin Other (comments)     \"TIGHTNESS OF CHEST\"       Past Medical History:   Diagnosis Date    Arthritis     GERD (gastroesophageal reflux disease)     Hemorrhoids 2020    Hypertension     Menopause     LMP-       Past Surgical History:   Procedure Laterality Date    COLONOSCOPY N/A 2020    COLONOS[facility] performed by [facility]a [facility]on [facility] at [facility] HX OTHER SURGICAL Bilateral     bunionectomy    AGAPITO STEREO  BX BREAST LT 1ST LESION W/CLIP AND SPECIMEN Left long ago    Benign    MN ABDOMEN SURGERY PROC UNLISTED      gallbladder       Family History   Problem Relation Age of Onset    Hypertension Mother     Heart Disease Father          OF HEART ATTACK IN EARLY 60'S    Heart Disease Sister     Hypertension Sister     Hypertension Brother     Alcohol abuse Sister     Alcohol abuse Sister     Gout Sister     Stroke Sister     Heart Disease Sister     Other Sister         ADDICTION TO NARCOTICS    No Known Problems Sister     Stroke Sister     Heart Disease Sister     Hypertension Sister     No Known Problems Sister     Cancer Sister         OVARIAN    Anesth Problems Neg Hx        Social History     Socioeconomic History    Marital status:      Spouse name: Not on file    Number of children: Not on file    Years of education: Not on file    Highest education level: Not on file   Occupational History    Not on file   Social Needs    Financial resource strain: Not on file    Food insecurity     Worry: Not on file     Inability: Not on file    Transportation needs     Medical: Not on file     Non-medical: Not on file   Tobacco Use    Smoking status: Never Smoker    Smokeless tobacco: Never Used    Tobacco comment: \"TRIED A FEW TIMES\"   Substance and Sexual Activity    Alcohol use: No    Drug use: No    Sexual activity: Not on file   Lifestyle    Physical activity     Days per week: Not on file     Minutes per session: Not on file    Stress: Not on file   Relationships    Social connections     Talks on phone: Not on file     Gets together: Not on file Attends Alevism service: Not on file     Active member of club or organization: Not on file     Attends meetings of clubs or organizations: Not on file     Relationship status: Not on file    Intimate partner violence     Fear of current or ex partner: Not on file     Emotionally abused: Not on file     Physically abused: Not on file     Forced sexual activity: Not on file   Other Topics Concern    Not on file   Social History Narrative    Not on file       Hospital Outpatient Visit on 01/08/2021   Component Date Value Ref Range Status    SARS-CoV-2 01/08/2021 Not Detected  Not Detected   Final    Comment: (NOTE)  This nucleic acid amplification test was developed and its  performance characteristics determined by [facility]. Nucleic acid amplification tests include PCR and TMA. This test has  not been FDA cleared or approved. This test has been authorized by  FDA under an Emergency Use Authorization (EUA). This test is only  authorized for the duration of time the declaration that  circumstances exist justifying the authorization of the emergency use  of in vitro diagnostic tests for detection of SARS-CoV-2 virus and/or  diagnosis of COVID-19 infection under section 564(b)(1) of the Act,  21 U. S.C. 156HXK-3(R) (1), unless the authorization is terminated or  revoked sooner. When diagnostic testing is negative, the possibility of a false  negative result should be considered in the context of a patient's  recent exposures and the presence of clinical signs and symptoms  consistent with COVID-19. An individual without symptoms of COVID-  19 and who is not shedding SARS-CoV-2 vi                           lakeisha would expect to have a  negative (not detected) result in this assay.   Performed At: [address] [id number]  KALI Rosa 97 [id number]     Hospital Outpatient Visit on 12/29/2020   Component Date Value Ref Range Status    Ventricular Rate 12/29/2020 66  BPM Final    Atrial Rate 12/29/2020 66  BPM Final    P-R Interval 12/29/2020 92  ms Final    QRS Duration 12/29/2020 70  ms Final    Q-T Interval 12/29/2020 438  ms Final    QTC Calculation (Bezet) 12/29/2020 459  ms Final    Calculated P Axis 12/29/2020 60  degrees Final    Calculated R Axis 12/29/2020 -5  degrees Final    Calculated T Axis 12/29/2020 39  degrees Final    Diagnosis 12/29/2020    Final                    Value:Sinus rhythm with short VA  Voltage criteria for left ventricular hypertrophy  Nonspecific ST and T wave abnormality  Abnormal ECG  When compared with ECG of 21-SEP-2018 10:10,  ST now depressed in Anterior leads  Confirmed by Lin Pyle MD, Sterling Purvis ([id number]) on 12/29/2020 7:39:58 PM     Orders Only on 12/23/2020   Component Date Value Ref Range Status    Hep C virus Ab Interp. 12/23/2020 NONREACTIVE  NONREACTIVE   Final    Hep C  virus Ab comment 12/23/2020 Method used is [place] [employer]    Final    LIPID PROFILE 12/23/2020        Final    Cholesterol, total 12/23/2020 234* <200 MG/DL Final    Triglyceride 12/23/2020 106  <150 MG/DL Final    Comment: Based on NCEP-ATP III:  Triglycerides <150 mg/dL  is considered normal, 150-199  mg/dL  borderline high,  200-499 mg/dL high and  greater than or equal to 500  mg/dL very high.  HDL Cholesterol 12/23/2020 59  MG/DL Final    Comment: Based on NCEP ATP III, HDL Cholesterol <40 mg/dL is considered low and >60  mg/dL is elevated.       LDL, calculated 12/23/2020 153.8* 0 - 100 MG/DL Final    Comment: Based on the NCEP-ATP: LDL-C concentrations are considered  optimal <100 mg/dL,  near optimal/above Normal 100-129 mg/dL Borderline High: 130-159, High: 160-189  mg/dL Very High: Greater than or equal to 190 mg/dL      VLDL, calculated 12/23/2020 21.2  MG/DL Final    CHOL/HDL Ratio 12/23/2020 4.0  0.0 - 5.0   Final    WBC 12/23/2020 7.0  3.6 - 11.0 K/uL Final    RBC 12/23/2020 4.48  3.80 - 5.20 M/uL Final    HGB 12/23/2020 12.4  11.5 - 16.0 g/dL Final    HCT 12/23/2020 39.7  35.0 - 47.0 % Final    MCV 12/23/2020 88.6  80.0 - 99.0 FL Final    MCH 12/23/2020 27.7  26.0 - 34.0 PG Final    MCHC 12/23/2020 31.2  30.0 - 36.5 g/dL Final    RDW 12/23/2020 12.5  11.5 - 14.5 % Final    PLATELET 54/82/8858 771  150 - 400 K/uL Final    MPV 12/23/2020 10.5  8.9 - 12.9 FL Final    NRBC 12/23/2020 0.0  0  WBC Final    ABSOLUTE NRBC 12/23/2020 0.00  0.00 - 0.01 K/uL Final    Sodium 12/23/2020 136  136 - 145 mmol/L Final    Potassium 12/23/2020 4.2  3.5 - 5.1 mmol/L Final    Chloride 12/23/2020 102  97 - 108 mmol/L Final    CO2 12/23/2020 29  21 - 32 mmol/L Final    Anion gap 12/23/2020 5  5 - 15 mmol/L Final    Glucose 12/23/2020 100  65 - 100 mg/dL Final    BUN 12/23/2020 22* 6 - 20 MG/DL Final    Creatinine 12/23/2020 1.31* 0.55 - 1.02 MG/DL Final    BUN/Creatinine ratio 12/23/2020 17  12 - 20   Final    GFR est AA 12/23/2020 48* >60 ml/min/1.73m2 Final    GFR est non-AA 12/23/2020 40* >60 ml/min/1.73m2 Final    Comment: Estimated GFR is calculated using the IDMS-traceable Modification of Diet in  Renal Disease (MDRD) Study equation, reported for both  Americans  (GFRAA) and non- Americans (GFRNA), and normalized to 1.73m2 body  surface area. The physician must decide which value applies to the patient.  Calcium 12/23/2020 9.4  8.5 - 10.1 MG/DL Final    Bilirubin, total 12/23/2020 0.8  0.2 - 1.0 MG/DL Final    ALT (SGPT) 12/23/2020 30  12 - 78 U/L Final    AST (SGOT) 12/23/2020 19  15 - 37 U/L Final    Alk. phosphatase 12/23/2020 84  45 - 117 U/L Final    Protein, total 12/23/2020 8.9* 6.4 - 8.2 g/dL Final    Albumin 12/23/2020 4.5  3.5 - 5.0 g/dL Final    Globulin 12/23/2020 4.4* 2.0 - 4.0 g/dL Final    A-G Ratio 12/23/2020 1.0* 1.1 - 2.2   Final     Review of Systems   Constitutional: Negative for diaphoresis and fatigue.    Respiratory: Negative for chest tightness and shortness of breath. Cardiovascular: Negative for chest pain and leg swelling. Gastrointestinal: Negative for abdominal pain and blood in stool. Genitourinary: Negative for dysuria and frequency. Musculoskeletal: Negative for back pain and joint swelling. Neurological: Negative for dizziness and headaches. Psychiatric/Behavioral: Negative for behavioral problems. The patient is not nervous/anxious. Visit Vitals  /75 (BP 1 Location: Left arm, BP Patient Position: Sitting)   Pulse 70   Temp 97.1 °F (36.2 °C) (Temporal)   Resp 18   Ht 4' 9\" (1.448 m)   Wt 152 lb 12.8 oz (69.3 kg)   SpO2 100%   BMI 33.07 kg/m²     Physical Exam  Nursing note reviewed. Exam conducted with a chaperone present. Constitutional:       Appearance: Normal appearance. She is obese. HENT:      Nose: Nose normal. No congestion. Eyes:      General:         Right eye: No discharge. Left eye: No discharge. Pupils: Pupils are equal, round, and reactive to light. Neck:      Musculoskeletal: Normal range of motion and neck supple. Cardiovascular:      Rate and Rhythm: Normal rate and regular rhythm. Abdominal:      General: Bowel sounds are normal.      Palpations: Abdomen is soft. There is no mass. Musculoskeletal: Normal range of motion. General: No tenderness. Skin:     General: Skin is warm and dry. Coloration: Skin is not jaundiced. Neurological:      General: No focal deficit present. Mental Status: She is oriented to person, place, and time. Cranial Nerves: No cranial nerve deficit. Psychiatric:         Mood and Affect: Mood normal.         Behavior: Behavior normal.       ASSESSMENT/PLAN:  1. Essential hypertension  -     lisinopril-hydroCHLOROthiazide (PRINZIDE, ZESTORETIC) 10-12.5 mg per tablet; Take 1 Tab by mouth daily for 90 days. , Normal, Disp-90 Tab, R-0  -     METABOLIC PANEL, COMPREHENSIVE;  Future  I decreased her dose of lisinopril-HCTZ from 20-25 mg to 10-12.5 mg every day. Repeat labs in 8 weeks. 2. Osteopenia of multiple sites  Bone density scan result reviewed with her. I instructed her to take calcium 500 mg twice a week and vitamin D3 1000 iu daily. 3. Obesity (BMI 30.0-34. 9)  She will start working on low carb diet & exercise. 4. Mixed hyperlipidemia  She wants to try diet and exercise for few months if no improvement then will consider medication.  -     LIPID PANEL; Future    5. Elevated serum creatinine  -     METABOLIC PANEL, COMPREHENSIVE; Future  -     PROTEIN ELECTROPHORESIS; Future    6. S/P hemorrhoidectomy   Doing well after the surgery. Follow up with surgeon in 2 weeks. I instructed her to start taking vitamin B12 1000 mcg every day to help with her cramping    This plan was reviewed with the patient and patient agrees. All questions were answered. This scribe documentation was reviewed by me and accurately reflects the examination and decisions made by me. An electronic signature was used to authenticate this note.   -- Prasanth Zaldivar

## 2021-01-27 ENCOUNTER — OFFICE VISIT (OUTPATIENT)
Dept: SURGERY | Age: 71
End: 2021-01-27
Payer: MEDICARE

## 2021-01-27 VITALS
RESPIRATION RATE: 16 BRPM | HEART RATE: 78 BPM | OXYGEN SATURATION: 96 % | SYSTOLIC BLOOD PRESSURE: 113 MMHG | TEMPERATURE: 98.4 F | BODY MASS INDEX: 32.1 KG/M2 | WEIGHT: 148.8 LBS | HEIGHT: 57 IN | DIASTOLIC BLOOD PRESSURE: 77 MMHG

## 2021-01-27 DIAGNOSIS — K64.2 GRADE III HEMORRHOIDS: Primary | ICD-10-CM

## 2021-01-27 DIAGNOSIS — Z09 POSTOPERATIVE EXAMINATION: ICD-10-CM

## 2021-01-27 DIAGNOSIS — Z51.89 ENCOUNTER FOR WOUND CARE: ICD-10-CM

## 2021-01-27 PROCEDURE — 99024 POSTOP FOLLOW-UP VISIT: CPT | Performed by: NURSE PRACTITIONER

## 2021-01-27 NOTE — PROGRESS NOTES
1. Have you been to the ER, urgent care clinic since your last visit? Hospitalized since your last visit? No    2. Have you seen or consulted any other health care providers outside of the [address] [place] [address] since your last visit? Include any pap smears or colon screening. No    Julius Salcedo  Body composition    female  65 y.o. Vitals:    01/27/21 1009   BP: 113/77   Pulse: 78   Resp: 16   Temp: 98.4 °F (36.9 °C)   TempSrc: Oral   SpO2: 96%   Weight: 148 lb 12.8 oz (67.5 kg)   Height: 4' 9\" (1.448 m)     Body mass index is 32.2 kg/m².

## 2021-01-27 NOTE — PROGRESS NOTES
Subjective:    Blossom Romberg is a 65 y.o. female presents for postop care following hemorrhoidectomy, EUS, rigid sigmoidectomy by Dr. Glen Chance. She is receiving wound care by self who reports no problems with wound care. The patient is not having any pain. She never had any pain after surgery. She has been able to move her bowels without straining during this post op period. Has been eating healthier. Objective:     Visit Vitals  /77 (BP 1 Location: Left arm, BP Patient Position: Sitting)   Pulse 78   Temp 98.4 °F (36.9 °C) (Oral)   Resp 16   Ht 4' 9\" (1.448 m)   Wt 148 lb 12.8 oz (67.5 kg)   SpO2 96%   BMI 32.20 kg/m²         Wound:  Location: rectum  clean, dry, no drainage, healed  periwound skin intact, no erythema or induration        Assessment:     S/P same. Doing well postoperatively. Plan:     1. Wound care discussed. Healing well. 2. Pt is to increase activities as tolerated. Continue high fiber, high fluid intake and avoid straining for at least the next month, preferably indefinitely. No heavy lifting for another 2 weeks. 3. Follow-up prn    Ms. Mena Vu has a reminder for a \"due or due soon\" health maintenance. I have asked that she contact her primary care provider for follow-up on this health maintenance. Patient verbalized understanding and agreement.

## 2021-01-27 NOTE — LETTER
1/27/2021 Patient: Dennys Walker YOB: 1950 Date of Visit: 1/27/2021 Yee Foster MD 
[address] [place] [id number] [address] [employer] Dear Yee Foster MD, Thank you for referring Ms. Antwon Yates to [facility] [address] [facility] for evaluation. My notes for this consultation are attached. If you have questions, please do not hesitate to call me. I look forward to following your patient along with you. Sincerely, Belle Casey NP

## 2021-01-27 NOTE — PATIENT INSTRUCTIONS
Hemorrhoidectomy: What to Expect at [facility]     After you have hemorrhoids removed, you can expect to feel better each day. Your anal area will be painful or ache for 2 to 4 weeks. And you may need pain medicine. It is common to have some light bleeding and clear or yellow fluids from your anus. This is most likely when you have a bowel movement. These symptoms may last for 1 to 2 months after surgery. After 1 to 2 weeks, you should be able to do most of your normal activities. But don't do things that require a lot of effort. It is important to avoid heavy lifting and straining with bowel movements while you recover. This care sheet gives you a general idea about how long it will take for you to recover. But each person recovers at a different pace. Follow the steps below to get better as quickly as possible. How can you care for yourself at home? Activity    · Rest when you feel tired.     · Be active. Walking is a good choice.     · Allow your body to heal. Don't move quickly or lift anything heavy until you are feeling better.     · You may take showers and baths as usual. Pat your anal area dry when you are done.     · You will probably need to take 1 to 2 weeks off work. It depends on the type of work you do and how you feel. Diet    · Follow your doctor's instructions about eating after surgery.     · Start adding high-fiber foods to your diet 2 or 3 days after your surgery. This will make bowel movements easier. And it lowers the chance that you will get hemorrhoids again.     · If your bowel movements are not regular right after surgery, try to avoid constipation and straining. Drink plenty of water. Your doctor may suggest fiber, a stool softener, or a mild laxative. Medications    · Your doctor will tell you if and when you can restart your medicines.  He or she will also give you instructions about taking any new medicines.     · If you take aspirin or some other blood thinner, ask your doctor if and when to start taking it again. Make sure that you understand exactly what your doctor wants you to do.     · Be safe with medicines. Read and follow all instructions on the label. ? If the doctor gave you a prescription medicine for pain, take it as prescribed. ? If you are not taking a prescription pain medicine, ask your doctor if you can take an over-the-counter medicine.     · If your doctor prescribed antibiotics, take them as directed. Do not stop taking them just because you feel better. You need to take the full course of antibiotics.     · You may apply numbing medicines before and after bowel movements to relieve pain. Other instructions    · Sit in a few inches of warm water (sitz bath) for 15 to 20 minutes 3 times a day and after bowel movements. Then pat the area dry. Do this as long as you have pain in your anal area.     · Avoid sitting on the toilet for long periods of time or straining during bowel movements.     · Keep your anal area clean.     · Support your feet with a small step stool when you sit on the toilet. This helps flex your hips and places your pelvis in a squatting position. This can make bowel movements easier after surgery.     · Use baby wipes or medicated pads, such as Tucks, instead of toilet paper after a bowel movement. These products do not irritate the anus.     · If your doctor recommends it, use an over-the-counter hydrocortisone cream on the skin in your anal area. This can reduce pain and itching after surgery.     · Apply ice several times a day for 10 minutes at a time.     · Try lying on your stomach with a pillow under your hips to decrease swelling. Follow-up care is a key part of your treatment and safety. Be sure to make and go to all appointments, and call your doctor if you are having problems. It's also a good idea to know your test results and keep a list of the medicines you take. When should you call for help?    Call 911 anytime you think you may need emergency care. For example, call if:    · You passed out (lost consciousness).     · You are short of breath. Call your doctor now or seek immediate medical care if:    · You have signs of infection, such as:  ? Increased pain, swelling, warmth, or redness. ? Red streaks leading from the area. ? Pus draining from the area. ? A fever.     · You have pain that does not get better after you take your pain medicine.     · You are sick to your stomach and cannot keep fluids down.     · You have signs of a blood clot in your leg (called a deep vein thrombosis), such as:  ? Pain in your calf, back of the knee, thigh, or groin. ? Redness and swelling in your leg or groin.     · You cannot pass stools or gas. Watch closely for changes in your health, and be sure to contact your doctor if you have any problems. Where can you learn more? Go to http://www.gray.com/  Enter C417 in the search box to learn more about \"Hemorrhoidectomy: What to Expect at Home. \"  Current as of: April 15, 2020               Content Version: 12.6  © 5106-2965 [employer]. Care instructions adapted under license by [employer] (which disclaims liability or warranty for this information). If you have questions about a medical condition or this instruction, always ask your healthcare professional. [address] any warranty or liability for your use of this information.

## 2021-04-06 ENCOUNTER — APPOINTMENT (OUTPATIENT)
Dept: GENERAL RADIOLOGY | Age: 71
End: 2021-04-06
Attending: EMERGENCY MEDICINE
Payer: MEDICARE

## 2021-04-06 ENCOUNTER — HOSPITAL ENCOUNTER (EMERGENCY)
Age: 71
Discharge: HOME | End: 2021-04-06
Attending: EMERGENCY MEDICINE
Payer: MEDICARE

## 2021-04-06 VITALS
HEIGHT: 57 IN | OXYGEN SATURATION: 100 % | DIASTOLIC BLOOD PRESSURE: 76 MMHG | TEMPERATURE: 97.5 F | SYSTOLIC BLOOD PRESSURE: 109 MMHG | WEIGHT: 143 LBS | HEART RATE: 64 BPM | BODY MASS INDEX: 30.85 KG/M2 | RESPIRATION RATE: 16 BRPM

## 2021-04-06 DIAGNOSIS — M70.32 BURSITIS OF LEFT ELBOW, UNSPECIFIED BURSA: Primary | ICD-10-CM

## 2021-04-06 PROCEDURE — 99282 EMERGENCY DEPT VISIT SF MDM: CPT

## 2021-04-06 PROCEDURE — 73080 X-RAY EXAM OF ELBOW: CPT

## 2021-04-06 NOTE — ED NOTES
MD Tera William reviewed discharge instructions with the patient. The patient verbalized understanding.

## 2021-04-06 NOTE — ED PROVIDER NOTES
HPI .  Patient has a history of arthritis, GERD, hemorrhoids, hypertension. Patient banged her left lateral elbow on the bathroom sink about 5 days ago. Pain initially was quite slight. Yesterday pain increased. Pain is localized over a small area lateral to the olecranon. There is no significant swelling. Extending elbow increases the pain. Direct palpation increases the pain. Pain is described as moderate. Patient has no history of gout.     Past Medical History:   Diagnosis Date    Arthritis     GERD (gastroesophageal reflux disease)     Hemorrhoids 2020    Hypertension     Menopause     LMP-       Past Surgical History:   Procedure Laterality Date    COLONOSCOPY N/A 2020    COLONOSCOPY performed by Efren Riley MD at [facility] HX OTHER SURGICAL Bilateral     bunionectomy    HX OTHER SURGICAL  2021    EUS,  Rigid sigmoidoscopy, Hemorrhoidectomy-Dr. Fela James AGAPITO STEREO  BX BREAST LT 1ST LESION W/CLIP AND SPECIMEN Left long ago    Benign    MT ABDOMEN SURGERY PROC UNLISTED      gallbladder         Family History:   Problem Relation Age of Onset    Hypertension Mother     Heart Disease Father          OF HEART ATTACK IN EARLY 60'S    Heart Disease Sister     Hypertension Sister     Hypertension Brother     Alcohol abuse Sister     Alcohol abuse Sister     Gout Sister     Stroke Sister     Heart Disease Sister     Other Sister         ADDICTION TO NARCOTICS    No Known Problems Sister     Stroke Sister     Heart Disease Sister     Hypertension Sister     No Known Problems Sister     Cancer Sister         OVARIAN    Anesth Problems Neg Hx        Social History     Socioeconomic History    Marital status:      Spouse name: Not on file    Number of children: Not on file    Years of education: Not on file    Highest education level: Not on file   Occupational History    Not on file   Social Needs    Financial resource strain: Not on file    Food insecurity     Worry: Not on file     Inability: Not on file    Transportation needs     Medical: Not on file     Non-medical: Not on file   Tobacco Use    Smoking status: Never Smoker    Smokeless tobacco: Never Used    Tobacco comment: \"TRIED A FEW TIMES\"   Substance and Sexual Activity    Alcohol use: No    Drug use: No    Sexual activity: Not on file   Lifestyle    Physical activity     Days per week: Not on file     Minutes per session: Not on file    Stress: Not on file   Relationships    Social connections     Talks on phone: Not on file     Gets together: Not on file     Attends Shinto service: Not on file     Active member of club or organization: Not on file     Attends meetings of clubs or organizations: Not on file     Relationship status: Not on file    Intimate partner violence     Fear of current or ex partner: Not on file     Emotionally abused: Not on file     Physically abused: Not on file     Forced sexual activity: Not on file   Other Topics Concern    Not on file   Social History Narrative    Not on file         ALLERGIES: Aspirin    Review of Systems   All other systems reviewed and are negative. Vitals:    04/06/21 1612   BP: 109/76   Pulse: 64   Resp: 16   Temp: 97.5 °F (36.4 °C)   SpO2: 100%   Weight: 64.9 kg (143 lb)   Height: 4' 9\" (1.448 m)            Physical Exam  Vitals signs and nursing note reviewed. Constitutional:       Appearance: She is well-developed. HENT:      Head: Normocephalic and atraumatic. Eyes:      Pupils: Pupils are equal, round, and reactive to light. Neck:      Musculoskeletal: Normal range of motion and neck supple. Cardiovascular:      Rate and Rhythm: Normal rate and regular rhythm. Heart sounds: Normal heart sounds. No murmur. No friction rub. No gallop. Pulmonary:      Effort: Pulmonary effort is normal. No respiratory distress. Breath sounds: No wheezing or rales.    Abdominal:      Palpations: Abdomen is soft. Tenderness: There is no abdominal tenderness. There is no rebound. Musculoskeletal:         General: No tenderness. Comments: Left elbow tender lateral to the olecranon; passive extension increases the pain   Skin:     Findings: No erythema. Neurological:      Mental Status: She is alert. Cranial Nerves: No cranial nerve deficit.       Comments: Motor; symmetric   Psychiatric:         Behavior: Behavior normal.          MDM       Procedures

## 2021-04-06 NOTE — ED TRIAGE NOTES
Arrives with cc of elbow pain that started yesterday and radiates toward wrist. Reports hitting her elbow against the sink last week. Took a \"pill they gave me after surgery\" for the pain with no relief. No deformities noted.

## 2021-04-06 NOTE — DISCHARGE INSTRUCTIONS
Apply moist heat 3 times a day; take Tylenol every 4 hours as needed for pain; gentle range of motion 3 times a day

## 2021-04-12 ENCOUNTER — OFFICE VISIT (OUTPATIENT)
Dept: INTERNAL MEDICINE CLINIC | Age: 71
End: 2021-04-12
Payer: MEDICARE

## 2021-04-12 DIAGNOSIS — Z23 ENCOUNTER FOR IMMUNIZATION: Primary | ICD-10-CM

## 2021-04-12 PROCEDURE — 0001A COVID-19, MRNA, LNP-S, PF, 30MCG/0.3ML DOSE(PFIZER): CPT | Performed by: FAMILY MEDICINE

## 2021-04-12 PROCEDURE — 91300 COVID-19, MRNA, LNP-S, PF, 30MCG/0.3ML DOSE(PFIZER): CPT | Performed by: FAMILY MEDICINE

## 2021-04-15 ENCOUNTER — TELEPHONE (OUTPATIENT)
Dept: PRIMARY CARE CLINIC | Age: 71
End: 2021-04-15

## 2021-04-15 ENCOUNTER — OFFICE VISIT (OUTPATIENT)
Dept: PRIMARY CARE CLINIC | Age: 71
End: 2021-04-15
Payer: MEDICARE

## 2021-04-15 VITALS
HEIGHT: 57 IN | SYSTOLIC BLOOD PRESSURE: 118 MMHG | DIASTOLIC BLOOD PRESSURE: 77 MMHG | BODY MASS INDEX: 31.63 KG/M2 | RESPIRATION RATE: 16 BRPM | HEART RATE: 58 BPM | TEMPERATURE: 97.8 F | WEIGHT: 146.6 LBS | OXYGEN SATURATION: 99 %

## 2021-04-15 DIAGNOSIS — R77.9 ELEVATED SERUM PROTEIN LEVEL: ICD-10-CM

## 2021-04-15 DIAGNOSIS — Z71.89 ACP (ADVANCE CARE PLANNING): ICD-10-CM

## 2021-04-15 DIAGNOSIS — L30.9 ECZEMA OF BOTH HANDS: ICD-10-CM

## 2021-04-15 DIAGNOSIS — M70.22 OLECRANON BURSITIS OF LEFT ELBOW: ICD-10-CM

## 2021-04-15 DIAGNOSIS — R79.89 ELEVATED SERUM CREATININE: ICD-10-CM

## 2021-04-15 DIAGNOSIS — E78.2 MIXED HYPERLIPIDEMIA: ICD-10-CM

## 2021-04-15 DIAGNOSIS — I10 ESSENTIAL HYPERTENSION: ICD-10-CM

## 2021-04-15 DIAGNOSIS — Z00.00 MEDICARE ANNUAL WELLNESS VISIT, SUBSEQUENT: Primary | ICD-10-CM

## 2021-04-15 LAB
ALBUMIN SERPL-MCNC: 4.4 G/DL (ref 3.5–5)
ALBUMIN/GLOB SERPL: 1.1 {RATIO} (ref 1.1–2.2)
ALP SERPL-CCNC: 75 U/L (ref 45–117)
ALT SERPL-CCNC: 21 U/L (ref 12–78)
ANION GAP SERPL CALC-SCNC: 8 MMOL/L (ref 5–15)
AST SERPL-CCNC: 13 U/L (ref 15–37)
BILIRUB SERPL-MCNC: 0.6 MG/DL (ref 0.2–1)
BUN SERPL-MCNC: 16 MG/DL (ref 6–20)
BUN/CREAT SERPL: 15 (ref 12–20)
CALCIUM SERPL-MCNC: 10.3 MG/DL (ref 8.5–10.1)
CHLORIDE SERPL-SCNC: 104 MMOL/L (ref 97–108)
CHOLEST SERPL-MCNC: 203 MG/DL
CO2 SERPL-SCNC: 28 MMOL/L (ref 21–32)
CREAT SERPL-MCNC: 1.08 MG/DL (ref 0.55–1.02)
ERYTHROCYTE [DISTWIDTH] IN BLOOD BY AUTOMATED COUNT: 12 % (ref 11.5–14.5)
GLOBULIN SER CALC-MCNC: 4.1 G/DL (ref 2–4)
GLUCOSE SERPL-MCNC: 105 MG/DL (ref 65–100)
HCT VFR BLD AUTO: 39.6 % (ref 35–47)
HDLC SERPL-MCNC: 49 MG/DL
HDLC SERPL: 4.1 {RATIO} (ref 0–5)
HGB BLD-MCNC: 12.1 G/DL (ref 11.5–16)
LDLC SERPL CALC-MCNC: 128.4 MG/DL (ref 0–100)
LIPID PROFILE,FLP: ABNORMAL
MCH RBC QN AUTO: 28.3 PG (ref 26–34)
MCHC RBC AUTO-ENTMCNC: 30.6 G/DL (ref 30–36.5)
MCV RBC AUTO: 92.5 FL (ref 80–99)
NRBC # BLD: 0 K/UL (ref 0–0.01)
NRBC BLD-RTO: 0 PER 100 WBC
PLATELET # BLD AUTO: 329 K/UL (ref 150–400)
PMV BLD AUTO: 10.4 FL (ref 8.9–12.9)
POTASSIUM SERPL-SCNC: 4.4 MMOL/L (ref 3.5–5.1)
PROT SERPL-MCNC: 8.5 G/DL (ref 6.4–8.2)
RBC # BLD AUTO: 4.28 M/UL (ref 3.8–5.2)
SODIUM SERPL-SCNC: 140 MMOL/L (ref 136–145)
TRIGL SERPL-MCNC: 128 MG/DL (ref ?–150)
VLDLC SERPL CALC-MCNC: 25.6 MG/DL
WBC # BLD AUTO: 6.8 K/UL (ref 3.6–11)

## 2021-04-15 PROCEDURE — 99214 OFFICE O/P EST MOD 30 MIN: CPT | Performed by: INTERNAL MEDICINE

## 2021-04-15 PROCEDURE — G9899 SCRN MAM PERF RSLTS DOC: HCPCS | Performed by: INTERNAL MEDICINE

## 2021-04-15 PROCEDURE — G0438 PPPS, INITIAL VISIT: HCPCS | Performed by: INTERNAL MEDICINE

## 2021-04-15 PROCEDURE — 1101F PT FALLS ASSESS-DOCD LE1/YR: CPT | Performed by: INTERNAL MEDICINE

## 2021-04-15 PROCEDURE — 3017F COLORECTAL CA SCREEN DOC REV: CPT | Performed by: INTERNAL MEDICINE

## 2021-04-15 PROCEDURE — G8510 SCR DEP NEG, NO PLAN REQD: HCPCS | Performed by: INTERNAL MEDICINE

## 2021-04-15 PROCEDURE — G8417 CALC BMI ABV UP PARAM F/U: HCPCS | Performed by: INTERNAL MEDICINE

## 2021-04-15 PROCEDURE — 1090F PRES/ABSN URINE INCON ASSESS: CPT | Performed by: INTERNAL MEDICINE

## 2021-04-15 PROCEDURE — G8536 NO DOC ELDER MAL SCRN: HCPCS | Performed by: INTERNAL MEDICINE

## 2021-04-15 PROCEDURE — G8427 DOCREV CUR MEDS BY ELIG CLIN: HCPCS | Performed by: INTERNAL MEDICINE

## 2021-04-15 PROCEDURE — G8754 DIAS BP LESS 90: HCPCS | Performed by: INTERNAL MEDICINE

## 2021-04-15 PROCEDURE — G8752 SYS BP LESS 140: HCPCS | Performed by: INTERNAL MEDICINE

## 2021-04-15 PROCEDURE — G8399 PT W/DXA RESULTS DOCUMENT: HCPCS | Performed by: INTERNAL MEDICINE

## 2021-04-15 RX ORDER — AMLODIPINE BESYLATE 5 MG/1
5 TABLET ORAL DAILY
Qty: 90 TAB | Refills: 0 | Status: SHIPPED | OUTPATIENT
Start: 2021-04-15 | End: 2021-07-20 | Stop reason: SDUPTHER

## 2021-04-15 RX ORDER — DICLOFENAC SODIUM 10 MG/G
2 GEL TOPICAL 4 TIMES DAILY
Qty: 100 G | Refills: 0 | Status: SHIPPED | OUTPATIENT
Start: 2021-04-15 | End: 2021-05-15

## 2021-04-15 RX ORDER — HYDROCORTISONE 25 MG/G
CREAM TOPICAL 2 TIMES DAILY
Qty: 30 G | Refills: 0 | Status: SHIPPED | OUTPATIENT
Start: 2021-04-15 | End: 2022-07-10 | Stop reason: ALTCHOICE

## 2021-04-15 RX ORDER — LISINOPRIL 10 MG/1
10 TABLET ORAL DAILY
Qty: 90 TAB | Refills: 0 | Status: SHIPPED | OUTPATIENT
Start: 2021-04-15 | End: 2021-06-15 | Stop reason: SDUPTHER

## 2021-04-15 NOTE — PROGRESS NOTES
Chief Complaint   Patient presents with    Hypertension     Visit Vitals  /77 (BP 1 Location: Left arm, BP Patient Position: Sitting, BP Cuff Size: Small adult)   Pulse (!) 58   Temp 97.8 °F (36.6 °C) (Oral)   Resp 16   Ht 4' 9\" (1.448 m)   Wt 146 lb 9.6 oz (66.5 kg)   SpO2 99%   BMI 31.72 kg/m²       3 most recent PHQ Screens 4/15/2021   Little interest or pleasure in doing things Not at all   Feeling down, depressed, irritable, or hopeless Not at all   Total Score PHQ 2 0     Abuse Screening Questionnaire 4/15/2021   Do you ever feel afraid of your partner? N   Are you in a relationship with someone who physically or mentally threatens you? N   Is it safe for you to go home? Y             1. Have you been to the ER, urgent care clinic since your last visit? Hospitalized since your last visit? yes 04/06/21 left side of elbow    2. Have you seen or consulted any other health care providers outside of the [address] [place] [address] since your last visit? Include any pap smears or colon screening.  no

## 2021-04-15 NOTE — PROGRESS NOTES
Giovanni Flannery is a 65 y.o. female and presents for Annual Medicare Wellness Visit. Assessment of cognitive impairment: Alert and oriented x 3    Depression Screen:   3 most recent PHQ Screens 4/15/2021   Little interest or pleasure in doing things Not at all   Feeling down, depressed, irritable, or hopeless Not at all   Total Score PHQ 2 0       Fall Risk Assessment:    Fall Risk Assessment, last 12 mths 4/15/2021   Able to walk? Yes   Fall in past 12 months? 0   Do you feel unsteady? -   Are you worried about falling -   Is the gait abnormal? -   Number of falls in past 12 months -   Fall with injury? -       Abuse Screen:   Abuse Screening Questionnaire 4/15/2021   Do you ever feel afraid of your partner? N   Are you in a relationship with someone who physically or mentally threatens you? N   Is it safe for you to go home? Y       Activities of Daily Living:  Self-care. Requires assistance with: no ADLs  Patient handle his/her own medications  yes Use of pill box  no  Activities of Daily Living:   ADL Assessment 4/15/2021   Feeding yourself No Help Needed   Getting from bed to chair No Help Needed   Getting dressed No Help Needed   Bathing or showering No Help Needed   Walk across the room (includes cane/walker) No Help Needed   Using the telphone No Help Needed   Taking your medications No Help Needed   Preparing meals No Help Needed   Managing money (expenses/bills) No Help Needed   Moderately strenuous housework (laundry) No Help Needed   Shopping for personal items (toiletries/medicines) No Help Needed   Shopping for groceries No Help Needed   Driving No Help Needed   Climbing a flight of stairs No Help Needed   Getting to places beyond walking distances No Help Needed       Health Maintenance:  Daily Aspirin: no   Bone Density: up to date   Glaucoma Screening: yes, done in 01/21   Immunizations:    Tetanus: patient declines. Influenza: patient declines. Shingles: patient declines.   PPSV-23: patient declines. Prevnar-13: patient declines . Cancer screening:    Cervical: no longer required. Breast: up to date. Colon: up to date in 02/20    Alcohol Risk Screen:   On any occasion during the past 3 months, have you had more than 3 drinks(female) or 4 drinks (male) containing alcohol in one? No  Do you average more than 7 drinks (female) or 14 drinks (male) per week? No  Type and amount:n/a    Hearing Loss:  Hearing is good. denies any hearing loss wears hearing aides    Vision Loss:   Wears glasses, contact lenses, or have any other visual impairment  Yes glasses    Adult Nutrition Screen:  No risk factors noted. Advance Care Planning:   End of Life Planning: has NO advanced directive  - add't info requested. Referral to : yes,   Vy Zapata ACP-Facilitator appointment yes      Medications/Allergies: Reviewed with patient  Prior to Admission medications    Medication Sig Start Date End Date Taking? Authorizing Provider   lisinopril-hydroCHLOROthiazide (PRINZIDE, ZESTORETIC) 10-12.5 mg per tablet Take 1 Tab by mouth daily for 90 days. 1/15/21 4/15/21 Yes Silvino Mcmahon MD   amLODIPine (NORVASC) 10 mg tablet Take 10 mg by mouth daily. Yes Provider, Historical       Allergies   Allergen Reactions    Aspirin Other (comments)     \"TIGHTNESS OF CHEST\"       Objective:  Visit Vitals  /77 (BP 1 Location: Left arm, BP Patient Position: Sitting, BP Cuff Size: Small adult)   Pulse (!) 58   Temp 97.8 °F (36.6 °C) (Oral)   Resp 16   Ht 4' 9\" (1.448 m)   Wt 146 lb 9.6 oz (66.5 kg)   SpO2 99%   BMI 31.72 kg/m²    Body mass index is 31.72 kg/m². Problem List: Reviewed with patient and discussed risk factors.     Patient Active Problem List   Diagnosis Code    Hallux rigidus of left foot M20.22    Hallux valgus (acquired), right foot M20.11    Eczema L30.9    Essential hypertension I10    Arthritis M19.90    Hemorrhoids K64.9       PSH: Reviewed with patient  Past Surgical History:   Procedure Laterality Date    COLONOSCOPY N/A 2020    COLON[facility] performed [facility] at [facility] HX HEMORRHOIDECTOMY      HX OTHER SURGICAL Bilateral     bunionectomy    HX OTHER SURGICAL  2021    EUS,  Rigid sigmoidoscopy, Hemorrhoidectomy-Dr. Fela James AGAPITO STEREO  BX BREAST LT 1ST LESION W/CLIP AND SPECIMEN Left long ago    Benign    VT ABDOMEN SURGERY PROC UNLISTED      gallbladder        SH: Reviewed with patient  Social History     Tobacco Use    Smoking status: Never Smoker    Smokeless tobacco: Never Used    Tobacco comment: \"TRIED A FEW TIMES\"   Substance Use Topics    Alcohol use: No    Drug use: No       FH: Reviewed with patient  Family History   Problem Relation Age of Onset    Hypertension Mother     Heart Disease Father          OF HEART ATTACK IN EARLY 60'S    Heart Disease Sister     Hypertension Sister     Hypertension Brother     Alcohol abuse Sister     Alcohol abuse Sister     Gout Sister     Stroke Sister     Heart Disease Sister     Other Sister         ADDICTION TO NARCOTICS    No Known Problems Sister     Stroke Sister     Heart Disease Sister     Hypertension Sister     No Known Problems Sister     Cancer Sister         OVARIAN    Anesth Problems Neg Hx        Current medical providers:    Patient Care Team:  Tasha Aaron MD as PCP - General (Internal Medicine)  Tasha Aaron [facility]  Kaylin Olmos MD (General Surgery)    Plan:    Diagnoses and all orders for this visit:    Medicare annual wellness visit, subsequent  Immunization and health screening discussed with her. She declines vaccinations and is aware of the consequences of not getting vaccines. She has received Moderna Ist dose.      ACP (advance care planning)  -     REFERRAL TO ACP CLINICAL SPECIALIST    Health Maintenance   Topic Date Due    Medicare Yearly Exam  Never done    Pneumococcal 65+ years (1 of 1 - PPSV23) 2021 (Originally 10/11/2013)    Flu Vaccine (Season Ended) 2024 (Originally 2021)    Shingrix Vaccine Age 50> (1 of 2) 2024 (Originally 10/11/1998)    DTaP/Tdap/Td series (1 - Tdap) 2025 (Originally 10/11/1969)    COVID-19 Vaccine (2 - Pfizer 2-dose series) 2021    Breast Cancer Screen Mammogram  2022    Lipid Screen  2025    Colorectal Cancer Screening Combo  2030    Hepatitis C Screening  Completed    Bone Densitometry (Dexa) Screening  Completed          Urinary/ Fecal Incontinence: none    Regular physical exercise: yes walking on treadmill and stationary bike    Patient verbalized understanding of information presented. AVS and Medicare Part B Preventive Services Table printed and given to pt and reviewed. See table for findings under Recommendation and Scheduled. All of the patient's questions were answered. ritten by Karen Ramos, as dictated by Dr. Eduardo Steven MD.    Jules Barclay (: 1950) is a 65 y.o. female, established patient, here for evaluation of the following chief complaint(s):  Hypertension, Arm Pain (left side of elbo-ER visit 21), and Annual Wellness Visit       ASSESSMENT/PLAN:  1. Essential hypertension  -     CBC W/O DIFF; Future  -     lisinopriL (PRINIVIL, ZESTRIL) 10 mg tablet; Take 1 Tab by mouth daily for 90 days. , Normal, Disp-90 Tab, R-0  -     amLODIPine (NORVASC) 5 mg tablet; Take 1 Tab by mouth daily for 90 days. , Normal, Disp-90 Tab, R-0 sent to pharmacy. I stopped her lisinopril-HCTZ and switched her to taking lisinopril only. I also decreased her amlodipine from 10 mg to 5 mg every day and instructed her to continue checking her BP. If her BP is still running low at home, she can take a half tablet of lisinopril every day. Labs drawn in office today for medication management. 2. Olecranon bursitis of left elbow  -     diclofenac (VOLTAREN) 1 % gel;  Apply 2 g to affected area four (4) times daily for 30 days. , Normal, Disp-100 g, R-0 sent to pharmacy. Potential side effects were discussed. I prescribed Voltaren gel and instructed her to apply it to her elbow for pain. She should let me know if her pain persists so I can refer her to orthopedics for injections. 3. Mixed hyperlipidemia  -     LIPID PANEL; Future  Ordered fasting labs for pt to complete today in office. Waiting on results. 4. Elevated serum creatinine  -     METABOLIC PANEL, COMPREHENSIVE; Future  -     GAMMOPATHY EVAL, SPEP/MOLLY, IG QT/FLC; Future  I ordered labs to monitor her elevated serum creatinine. 5. Elevated serum protein level  -     GAMMOPATHY EVAL, SPEP/MOLLY, IG QT/FLC; Future  I ordered labs to monitor her elevated serum protein level. 6. Eczema of both hands  -     hydrocortisone (HYTONE) 2.5 % topical cream; Apply  to affected area two (2) times a day. use thin layer, Normal, Disp-30 g, R-0 sent to pharmacy. Potential side effects were discussed. I prescribed hydrocortisone cream for her eczema. SUBJECTIVE/OBJECTIVE:  HPI  Pt presents today to follow up. She was recently seen in the [place] ED on 04/06/21 for L elbow pain. She was diagnosed with bursitis and has been continuing to have pain. She takes vitamin D every day and calcium 2x a week. She had her hemorrhoidectomy done with Dr. Malika Sanchez and has been doing well since then. She has been taking a half tablet of lisinopril-HCTZ every day recently and her BP is normal in office today at 118/77. Her last labs done on 12/23/20 showed elevated total cholesterol (234) and LDL (153.8). She has been walking regularly for exercise and watching her diet.      Her eczema has been flaring up on her hands, and she requests a prescription for hydrocortisone cream.    Patient Active Problem List   Diagnosis Code    Hallux rigidus of left foot M20.22    Hallux valgus (acquired), right foot M20.11    Eczema L30.9    Essential hypertension I10    Arthritis M19.90    Hemorrhoids K64.9        Current Outpatient Medications on File Prior to Visit   Medication Sig Dispense Refill    lisinopril-hydroCHLOROthiazide (PRINZIDE, ZESTORETIC) 10-12.5 mg per tablet Take 1 Tab by mouth daily for 90 days. 90 Tab 0    [DISCONTINUED] amLODIPine (NORVASC) 10 mg tablet Take 10 mg by mouth daily. No current facility-administered medications on file prior to visit.         Allergies   Allergen Reactions    Aspirin Other (comments)     \"TIGHTNESS OF CHEST\"       Past Medical History:   Diagnosis Date    Arthritis     GERD (gastroesophageal reflux disease)     Hemorrhoids 2020    Hypertension     Menopause     LMP-       Past Surgical History:   Procedure Laterality Date    COLONOSCOPY N/A 2020    [facility] HX HEMORRHOIDECTOMY      HX OTHER SURGICAL Bilateral     bunionectomy    HX OTHER SURGICAL  2021    EUS,  Rigid sigmoidoscopy, Hemorrhoidectomy-Dr. Fela James AGAPITO STEREO  BX BREAST LT 1ST LESION W/CLIP AND SPECIMEN Left long ago    Benign    VT ABDOMEN SURGERY PROC UNLISTED      gallbladder       Family History   Problem Relation Age of Onset    Hypertension Mother     Heart Disease Father          OF HEART ATTACK IN EARLY 60'S    Heart Disease Sister     Hypertension Sister     Hypertension Brother     Alcohol abuse Sister     Alcohol abuse Sister     Gout Sister     Stroke Sister     Heart Disease Sister     Other Sister         ADDICTION TO NARCOTICS    No Known Problems Sister     Stroke Sister     Heart Disease Sister     Hypertension Sister     No Known Problems Sister     Cancer Sister         OVARIAN    Anesth Problems Neg Hx        Social History     Socioeconomic History    Marital status:      Spouse name: Not on file    Number of children: Not on file    Years of education: Not on file    Highest education level: Not on file Occupational History    Not on file   Social Needs    Financial resource strain: Not on file    Food insecurity     Worry: Not on file     Inability: Not on file    Transportation needs     Medical: Not on file     Non-medical: Not on file   Tobacco Use    Smoking status: Never Smoker    Smokeless tobacco: Never Used    Tobacco comment: \"TRIED A FEW TIMES\"   Substance and Sexual Activity    Alcohol use: No    Drug use: No    Sexual activity: Not on file   Lifestyle    Physical activity     Days per week: Not on file     Minutes per session: Not on file    Stress: Not on file   Relationships    Social connections     Talks on phone: Not on file     Gets together: Not on file     Attends Synagogue service: Not on file     Active member of club or organization: Not on file     Attends meetings of clubs or organizations: Not on file     Relationship status: Not on file    Intimate partner violence     Fear of current or ex partner: Not on file     Emotionally abused: Not on file     Physically abused: Not on file     Forced sexual activity: Not on file   Other Topics Concern    Not on file   Social History Narrative    Not on file       No visits with results within 3 Month(s) from this visit. Latest known visit with results is:   Hospital Outpatient Visit on 01/08/2021   Component Date Value Ref Range Status    SARS-CoV-2 01/08/2021 Not Detected  Not Detected   Final    Comment: (NOTE)  This nucleic acid amplification test was developed and its  performance characteristics determined by "[employer]. Nucleic acid amplification tests include PCR and TMA. This test has  not been FDA cleared or approved. This test has been authorized by  FDA under an Emergency Use Authorization (EUA).  This test is only  authorized for the duration of time the declaration that  circumstances exist justifying the authorization of the emergency use  of in vitro diagnostic tests for detection of SARS-CoV-2 virus and/or  diagnosis of COVID-19 infection under section 564(b)(1) of the Act,  21 U. S.C. 962JYZ-5(Y) (1), unless the authorization is terminated or  revoked sooner. When diagnostic testing is negative, the possibility of a false  negative result should be considered in the context of a patient's  recent exposures and the presence of clinical signs and symptoms  consistent with COVID-19. An individual without symptoms of COVID-  19 and who is not shedding SARS-CoV-2 vi                           lakeisha would expect to have a  negative (not detected) result in this assay. Performed At: [address] [id number]  Westley Mccarthy MD QB:[id number]       Review of Systems   Constitutional: Negative for activity change, fatigue and unexpected weight change. HENT: Negative for congestion, hearing loss, rhinorrhea and sore throat. Eyes: Negative for discharge. Respiratory: Negative for cough, chest tightness and shortness of breath. Cardiovascular: Negative for leg swelling. Gastrointestinal: Negative for abdominal pain, constipation and diarrhea. Genitourinary: Negative for dysuria, flank pain, frequency and urgency. Musculoskeletal: Positive for arthralgias (L elbow). Negative for back pain and myalgias. Skin: Positive for rash (eczema). Negative for color change. Neurological: Negative for dizziness, light-headedness and headaches. Psychiatric/Behavioral: Negative for dysphoric mood and sleep disturbance. The patient is not nervous/anxious. Visit Vitals  /77 (BP 1 Location: Left arm, BP Patient Position: Sitting, BP Cuff Size: Small adult)   Pulse (!) 58   Temp 97.8 °F (36.6 °C) (Oral)   Resp 16   Ht 4' 9\" (1.448 m)   Wt 146 lb 9.6 oz (66.5 kg)   SpO2 99%   BMI 31.72 kg/m²     Physical Exam  Vitals signs and nursing note reviewed. Constitutional:       General: She is not in acute distress. Appearance: Normal appearance. She is normal weight.  She is not diaphoretic. HENT:      Right Ear: Tympanic membrane, ear canal and external ear normal.      Left Ear: Tympanic membrane, ear canal and external ear normal.      Mouth/Throat:      Mouth: Mucous membranes are moist.      Pharynx: Oropharynx is clear. Eyes:      General:         Right eye: No discharge. Left eye: No discharge. Extraocular Movements: Extraocular movements intact. Conjunctiva/sclera: Conjunctivae normal.   Neck:      Thyroid: No thyromegaly. Cardiovascular:      Rate and Rhythm: Normal rate and regular rhythm. Pulses: Normal pulses. Dorsalis pedis pulses are 2+ on the right side and 2+ on the left side. Pulmonary:      Effort: Pulmonary effort is normal.      Breath sounds: Normal breath sounds. No wheezing. Abdominal:      General: Bowel sounds are normal. There is no distension. Palpations: Abdomen is soft. Tenderness: There is no abdominal tenderness. Musculoskeletal:      Left elbow: She exhibits decreased range of motion. Tenderness found. Olecranon process tenderness noted. Right lower leg: No edema. Left lower leg: No edema. Comments: B/L knees without crepitus   Lymphadenopathy:      Cervical: No cervical adenopathy. Skin:     Findings: Rash (B/L hands) present. Neurological:      Deep Tendon Reflexes:      Reflex Scores:       Patellar reflexes are 2+ on the right side and 2+ on the left side. Psychiatric:         Mood and Affect: Mood and affect normal.         An electronic signature was used to authenticate this note.   -- Torito Patel

## 2021-04-18 NOTE — PROGRESS NOTES
Errol Thomas you are doing well. Your blood report is back & cholesterol numbers have improved significantly. Kidney numbers are improving as well. I am still waiting for few more labs and will inform you as soon as I get it.

## 2021-04-19 ENCOUNTER — TELEPHONE (OUTPATIENT)
Dept: CASE MANAGEMENT | Age: 71
End: 2021-04-19

## 2021-04-19 LAB
ALBUMIN SERPL ELPH-MCNC: 4.1 G/DL (ref 2.9–4.4)
ALBUMIN/GLOB SERPL: 1.1 {RATIO} (ref 0.7–1.7)
ALPHA1 GLOB SERPL ELPH-MCNC: 0.2 G/DL (ref 0–0.4)
ALPHA2 GLOB SERPL ELPH-MCNC: 0.7 G/DL (ref 0.4–1)
B-GLOBULIN SERPL ELPH-MCNC: 1.3 G/DL (ref 0.7–1.3)
GAMMA GLOB SERPL ELPH-MCNC: 1.8 G/DL (ref 0.4–1.8)
GLOBULIN SER-MCNC: 4 G/DL (ref 2.2–3.9)
IGA SERPL-MCNC: 353 MG/DL (ref 64–422)
IGG SERPL-MCNC: 1907 MG/DL (ref 586–1602)
IGM SERPL-MCNC: 84 MG/DL (ref 26–217)
INTERPRETATION SERPL IEP-IMP: ABNORMAL
KAPPA LC FREE SER-MCNC: 32.1 MG/L (ref 3.3–19.4)
KAPPA LC FREE/LAMBDA FREE SER: 1.36 {RATIO} (ref 0.26–1.65)
LAMBDA LC FREE SERPL-MCNC: 23.6 MG/L (ref 5.7–26.3)
M PROTEIN SERPL ELPH-MCNC: ABNORMAL G/DL
PROT SERPL-MCNC: 8.1 G/DL (ref 6–8.5)

## 2021-04-19 NOTE — ACP (ADVANCE CARE PLANNING)
Ambulatory ACP Specialist Patient Outreach    Date:  4/19/2021  ACP Specialist: Bon Borges LCSW      Outreach call to patient in follow-up to ACP Specialist referral from:  [x] PCP  [] Provider   [] Ambulatory Care Management [] Other for:  [x] Continued Conversation for ACP decision making / Goals of Care  [] Code Status Discussion  [] Completion of Adv Directive  [] Completion of Portable DNR order  [] Other (Specify)    Date Referral Received:4/16/2021  Todays Outreach:  [x] First   [] Second  [] Third                               Third outreach made by []  phone  [] email []   [employer]   Intervention:  [x] Spoke with Patient  [] Left VM requesting return call    Outcome: Pt agreed to receive information to review.      Next Step:   [] ACP scheduled conversation  [x] Outreach again in one week               [x] Email / Mail ACP Info Sheets  [] Email / Mail Advance Directive  Bon Borges LCSW

## 2021-04-19 NOTE — TELEPHONE ENCOUNTER
Date:  4/19/2021  ACP Specialist: Taryn Knowles LCSW      Outreach call to patient in follow-up to ACP Specialist referral from:  [x] PCP  [] Provider   [] Ambulatory Care Management [] Other for:  [x] Continued Conversation for ACP decision making / Goals of Care  [] Code Status Discussion  [] Completion of Adv Directive  [] Completion of Portable DNR order  [] Other (Specify)    Date Referral Received:4/16/2021  Todays Outreach:  [x] First   [] Second  [] Third                               Third outreach made by []  phone  [] email []   Redeemhart   Intervention:  [x] Spoke with Patient  [] Left VM requesting return call    Outcome: Pt agreed to receive information to review.      Next Step:   [] ACP scheduled conversation  [x] Outreach again in one week               [x] Email / Mail ACP Info Sheets  [] Email / Mail Advance Directive  Taryn Knowles LCSW

## 2021-04-20 DIAGNOSIS — R77.9 ELEVATED BLOOD PROTEIN: Primary | ICD-10-CM

## 2021-04-20 DIAGNOSIS — D47.2 GAMMOPATHY: ICD-10-CM

## 2021-05-03 ENCOUNTER — IMMUNIZATION (OUTPATIENT)
Dept: INTERNAL MEDICINE CLINIC | Age: 71
End: 2021-05-03
Payer: MEDICARE

## 2021-05-03 DIAGNOSIS — Z23 ENCOUNTER FOR IMMUNIZATION: Primary | ICD-10-CM

## 2021-05-03 PROCEDURE — 91300 COVID-19, MRNA, LNP-S, PF, 30MCG/0.3ML DOSE(PFIZER): CPT | Performed by: FAMILY MEDICINE

## 2021-05-03 PROCEDURE — 0002A COVID-19, MRNA, LNP-S, PF, 30MCG/0.3ML DOSE(PFIZER): CPT | Performed by: FAMILY MEDICINE

## 2021-05-17 ENCOUNTER — TELEPHONE (OUTPATIENT)
Dept: PRIMARY CARE CLINIC | Age: 71
End: 2021-05-17

## 2021-05-17 NOTE — TELEPHONE ENCOUNTER
Elza Martinezorest called on behalf of her mom to request that an order be sent to the lab for bloodwork to be taken. She would like to re-do the test that was done about a month ago prior to going to the oncologist next month.

## 2021-05-18 ENCOUNTER — DOCUMENTATION ONLY (OUTPATIENT)
Dept: CASE MANAGEMENT | Age: 71
End: 2021-05-18

## 2021-05-18 NOTE — ACP (ADVANCE CARE PLANNING)
Advance Care Planning   Ambulatory ACP Specialist Patient Outreach    Date:  5/18/2021    ACP Specialist:  Bon Borges LCSW    Outreach call to patient in follow-up to ACP Specialist referral from:    [x] PCP  [] Provider   [] Ambulatory Care Management [] Other     For:                  [x] Continued Conversation for ACP decision making / Goals of Care             [] Code Status Discussion             [] Completion of Adv Directive             [] Completion of Portable DNR order             [] Other (Specify)    Date Referral Received:4/16/2021    Today's Outreach:  [] First   [] Second  [x] Third                                           Third outreach made by [x]  phone  [] email []   Zencoderhart     Intervention:  [x] Spoke with Patient   [] Left VM requesting return call      Outcome:pt and daughter called on 4/20/2021 for clarification of ACP service. I called and spoke with pt who had received the materials via email on 4/19/2021. SHe stated she is planning to schedule appt but not quite ready. She has my contact info to call if/when appt is desired. Next Step:   [] ACP scheduled conversation  [] Outreach again in one week               [] Email / Mail ACP Info Sheets  [] Email / Mail Advance Directive   [x]  Closing referral.  Routing closure to referring provider/staff and to ACP Specialist .      Thank you for this referral.  Bon Borges LCSW

## 2021-06-07 ENCOUNTER — DOCUMENTATION ONLY (OUTPATIENT)
Dept: ONCOLOGY | Age: 71
End: 2021-06-07

## 2021-06-07 NOTE — PROGRESS NOTES
Kind referral received from Dr. Lubna Arce for abnormal SPEP    I have reviewed the patients gammopathy evaluation  - This shows no abnormal protein      Please call her and let her know that she does not need to be seen by Heme Onc as I have discussed her labs with Dr. Lubna Arce

## 2021-06-15 DIAGNOSIS — I10 ESSENTIAL HYPERTENSION: ICD-10-CM

## 2021-06-15 RX ORDER — LISINOPRIL 10 MG/1
10 TABLET ORAL DAILY
Qty: 90 TABLET | Refills: 0 | Status: SHIPPED | OUTPATIENT
Start: 2021-06-15 | End: 2021-10-14 | Stop reason: SDUPTHER

## 2021-06-15 NOTE — TELEPHONE ENCOUNTER
Requested Prescriptions     Pending Prescriptions Disp Refills    lisinopriL (PRINIVIL, ZESTRIL) 10 mg tablet 90 Tablet 0     Sig: Take 1 Tablet by mouth daily for 90 days.         Last Visit 4/15/21  Last Refill 4/15/21

## 2021-07-20 DIAGNOSIS — I10 ESSENTIAL HYPERTENSION: ICD-10-CM

## 2021-07-21 RX ORDER — AMLODIPINE BESYLATE 5 MG/1
5 TABLET ORAL DAILY
Qty: 90 TABLET | Refills: 0 | Status: SHIPPED | OUTPATIENT
Start: 2021-07-21 | End: 2021-10-13 | Stop reason: SDUPTHER

## 2021-10-13 DIAGNOSIS — I10 ESSENTIAL HYPERTENSION: ICD-10-CM

## 2021-10-14 DIAGNOSIS — I10 ESSENTIAL HYPERTENSION: ICD-10-CM

## 2021-10-14 RX ORDER — AMLODIPINE BESYLATE 5 MG/1
5 TABLET ORAL DAILY
Qty: 90 TABLET | Refills: 0 | Status: SHIPPED | OUTPATIENT
Start: 2021-10-14 | End: 2022-01-12

## 2021-10-14 RX ORDER — LISINOPRIL 10 MG/1
10 TABLET ORAL DAILY
Qty: 90 TABLET | Refills: 0 | Status: SHIPPED | OUTPATIENT
Start: 2021-10-14 | End: 2022-01-12

## 2021-10-14 NOTE — TELEPHONE ENCOUNTER
Requested Prescriptions     Pending Prescriptions Disp Refills    lisinopriL (PRINIVIL, ZESTRIL) 10 mg tablet 90 Tablet 0     Sig: Take 1 Tablet by mouth daily for 90 days.         Last Visit 4/15/21  Last Refill 6/15/21

## 2021-10-14 NOTE — TELEPHONE ENCOUNTER
Requested Prescriptions     Pending Prescriptions Disp Refills    amLODIPine (NORVASC) 5 mg tablet 90 Tablet 0     Sig: Take 1 Tablet by mouth daily for 90 days.         Last Visit 4/15/21  Last Refill 7/21/21

## 2021-11-03 ENCOUNTER — OFFICE VISIT (OUTPATIENT)
Dept: PRIMARY CARE CLINIC | Age: 71
End: 2021-11-03
Payer: MEDICARE

## 2021-11-03 VITALS
HEIGHT: 57 IN | TEMPERATURE: 97.3 F | BODY MASS INDEX: 31.89 KG/M2 | SYSTOLIC BLOOD PRESSURE: 136 MMHG | WEIGHT: 147.8 LBS | HEART RATE: 73 BPM | RESPIRATION RATE: 15 BRPM | OXYGEN SATURATION: 99 % | DIASTOLIC BLOOD PRESSURE: 85 MMHG

## 2021-11-03 DIAGNOSIS — M19.90 ARTHRITIS: ICD-10-CM

## 2021-11-03 DIAGNOSIS — E66.9 OBESITY (BMI 30.0-34.9): ICD-10-CM

## 2021-11-03 DIAGNOSIS — E83.52 HYPERCALCEMIA: ICD-10-CM

## 2021-11-03 DIAGNOSIS — E78.2 MIXED HYPERLIPIDEMIA: ICD-10-CM

## 2021-11-03 DIAGNOSIS — M85.89 OSTEOPENIA OF MULTIPLE SITES: ICD-10-CM

## 2021-11-03 DIAGNOSIS — I10 PRIMARY HYPERTENSION: Primary | ICD-10-CM

## 2021-11-03 PROBLEM — K64.9 HEMORRHOIDS: Status: RESOLVED | Noted: 2020-12-28 | Resolved: 2021-11-03

## 2021-11-03 PROCEDURE — G8427 DOCREV CUR MEDS BY ELIG CLIN: HCPCS | Performed by: INTERNAL MEDICINE

## 2021-11-03 PROCEDURE — G9899 SCRN MAM PERF RSLTS DOC: HCPCS | Performed by: INTERNAL MEDICINE

## 2021-11-03 PROCEDURE — G8417 CALC BMI ABV UP PARAM F/U: HCPCS | Performed by: INTERNAL MEDICINE

## 2021-11-03 PROCEDURE — 99214 OFFICE O/P EST MOD 30 MIN: CPT | Performed by: INTERNAL MEDICINE

## 2021-11-03 PROCEDURE — G8754 DIAS BP LESS 90: HCPCS | Performed by: INTERNAL MEDICINE

## 2021-11-03 PROCEDURE — 3017F COLORECTAL CA SCREEN DOC REV: CPT | Performed by: INTERNAL MEDICINE

## 2021-11-03 PROCEDURE — G8536 NO DOC ELDER MAL SCRN: HCPCS | Performed by: INTERNAL MEDICINE

## 2021-11-03 PROCEDURE — G8510 SCR DEP NEG, NO PLAN REQD: HCPCS | Performed by: INTERNAL MEDICINE

## 2021-11-03 PROCEDURE — 1090F PRES/ABSN URINE INCON ASSESS: CPT | Performed by: INTERNAL MEDICINE

## 2021-11-03 PROCEDURE — 1101F PT FALLS ASSESS-DOCD LE1/YR: CPT | Performed by: INTERNAL MEDICINE

## 2021-11-03 PROCEDURE — G8399 PT W/DXA RESULTS DOCUMENT: HCPCS | Performed by: INTERNAL MEDICINE

## 2021-11-03 PROCEDURE — G8752 SYS BP LESS 140: HCPCS | Performed by: INTERNAL MEDICINE

## 2021-11-03 NOTE — PROGRESS NOTES
Chief Complaint   Patient presents with    Physical   Declined flu shot in office    Visit Vitals  /85 (BP 1 Location: Right arm)   Pulse 73   Temp 97.3 °F (36.3 °C)   Resp 15   Ht 4' 9\" (1.448 m)   Wt 147 lb 12.8 oz (67 kg)   SpO2 99%   BMI 31.98 kg/m²       1. Have you been to the ER, urgent care clinic since your last visit? Hospitalized since your last visit? No    2. Have you seen or consulted any other health care providers outside of the [address] [place] [address] since your last visit? Include any pap smears or colon screening.  No

## 2021-11-03 NOTE — PROGRESS NOTES
Pretty Turner (: 1950) is a 66 y.o. female, established patient, here for evaluation of the following chief complaint(s):  Follow-up     Written by Marina Barrow, as dictated by Dr. Charo Rea MD.      ASSESSMENT/PLAN:  Below is the assessment and plan developed based on review of pertinent history, physical exam, labs, studies, and medications. 1. Primary hypertension  Well controlled on current medication. Continue taking amlodipine 5 mg and lisinopril 10 mg as prescribed. Ordered labs to check CBC levels. Waiting for results. -     CBC W/O DIFF; Future    2. Arthritis  She does not take any pain medications. Advised her to take OTC Tylenol Arthritis if she has pain. 3. Mixed hyperlipidemia  Ordered labs to check lipid panel. Waiting for results. -     LIPID PANEL; Future    4. Hypercalcemia  Ordered labs to check metabolic panel. Waiting for results. Recommended staying Hydrated. -     METABOLIC PANEL, COMPREHENSIVE; Future    5. Obesity (BMI 30.0-34.9)  I commended the pt on her healthy lifestyle choices and routines. Explained that 5,000 steps are needed daily for healthy lifestyle and to maintain conditioning, and she will need to increase to 10,000 a day to work on weight loss. 6. Osteopenia of multiple sites  Continue taking OTC Vitamin D daily and calcium 2x/week as prescribed. Advised her to complete weight bearing exercises. SUBJECTIVE/OBJECTIVE:  HPI     Patient presents today for a follow up visit. She takes amlodipine 5 mg and lisinopril 10 mg for HTN. Her BP today is 136/85. She walks daily for exercise. Hematologist didn`t want to see her as her  Gammopathy evaluation came back unremarkable. She uses hydrocortisone 2.5% topical cream for eczema. She completed a colonoscopy in  and results were unremarkable. She completed a mammogram in 2020 and results were unremarkable.      She completed a DEXA scan in January and results revealed osteopenia. She takes OTC Vitamin D daily and calcium 2x/week. She does not remember the last time she completed a Pap smear. She is not followed by OBGYN. Patient Active Problem List   Diagnosis Code    Hallux rigidus of left foot M20.22    Hallux valgus (acquired), right foot M20.11    Eczema L30.9    Essential hypertension I10    Arthritis M19.90    Osteopenia of multiple sites M85.89        Current Outpatient Medications on File Prior to Visit   Medication Sig Dispense Refill    amLODIPine (NORVASC) 5 mg tablet Take 1 Tablet by mouth daily for 90 days. 90 Tablet 0    lisinopriL (PRINIVIL, ZESTRIL) 10 mg tablet Take 1 Tablet by mouth daily for 90 days. 90 Tablet 0    hydrocortisone (HYTONE) 2.5 % topical cream Apply  to affected area two (2) times a day. use thin layer (Patient not taking: Reported on 11/3/2021) 30 g 0     No current facility-administered medications on file prior to visit.        Allergies   Allergen Reactions    Aspirin Other (comments)     \"TIGHTNESS OF CHEST\"       Past Medical History:   Diagnosis Date    Arthritis     GERD (gastroesophageal reflux disease)     Hemorrhoids 2020    Hypertension     Menopause     LMP-       Past Surgical History:   Procedure Laterality Date    COLONOSCOPY N/A 2020    COL[facility] HX HEMORRHOIDECTOMY      HX OTHER SURGICAL Bilateral     bunionectomy    HX OTHER SURGICAL  2021    EUS,  Rigid sigmoidoscopy, Hemorrhoidectomy-Dr. Yulia Junior AGAPITO STEREO  BX BREAST LT 1ST LESION W/CLIP AND SPECIMEN Left long ago    Benign    MO ABDOMEN SURGERY PROC UNLISTED      gallbladder       Family History   Problem Relation Age of Onset    Hypertension Mother     Heart Disease Father          OF HEART ATTACK IN EARLY 60'S    Heart Disease Sister     Hypertension Sister     Hypertension Brother     Alcohol abuse Sister     Alcohol abuse Sister     Gout Sister     Stroke Sister     Heart Disease Sister     Other Sister         ADDICTION TO NARCOTICS    No Known Problems Sister     Stroke Sister     Heart Disease Sister     Hypertension Sister     No Known Problems Sister     Cancer Sister         OVARIAN    Anesth Problems Neg Hx        Social History     Socioeconomic History    Marital status:      Spouse name: Not on file    Number of children: Not on file    Years of education: Not on file    Highest education level: Not on file   Occupational History    Not on file   Tobacco Use    Smoking status: Never Smoker    Smokeless tobacco: Never Used    Tobacco comment: \"TRIED A FEW TIMES\"   Vaping Use    Vaping Use: Never used   Substance and Sexual Activity    Alcohol use: No    Drug use: No    Sexual activity: Not Currently   Other Topics Concern    Not on file   Social History Narrative    Not on file     Social Determinants of Health     Financial Resource Strain:     Difficulty of Paying Living Expenses: Not on file   Food Insecurity:     Worried About Running Out of Food in the Last Year: Not on file    Prakash of Food in the Last Year: Not on file   Transportation Needs:     Lack of Transportation (Medical): Not on file    Lack of Transportation (Non-Medical):  Not on file   Physical Activity:     Days of Exercise per Week: Not on file    Minutes of Exercise per Session: Not on file   Stress:     Feeling of Stress : Not on file   Social Connections:     Frequency of Communication with Friends and Family: Not on file    Frequency of Social Gatherings with Friends and Family: Not on file    Attends [employer] Services: Not on file    Active Member of Clubs or Organizations: Not on file    Attends Club or Organization Meetings: Not on file    Marital Status: Not on file   Intimate Partner Violence:     Fear of Current or Ex-Partner: Not on file    Emotionally Abused: Not on file    Physically Abused: Not on file   Whitley Cole Sexually Abused: Not on file   Housing Stability:     Unable to Pay for Housing in the Last Year: Not on file    Number of Places Lived in the Last Year: Not on file    Unstable Housing in the Last Year: Not on file       No visits with results within 3 Month(s) from this visit. Latest known visit with results is:   Office Visit on 04/15/2021   Component Date Value Ref Range Status    Immunoglobulin G, Qt. 04/15/2021 1,907* 586 - 1,602 mg/dL Final    Immunoglobulin A, Qt. 04/15/2021 353  64 - 422 mg/dL Final    Immunoglobulin M, Qt. 04/15/2021 84  26 - 217 mg/dL Final    Protein, total 04/15/2021 8.1  6.0 - 8.5 g/dL Final    Albumin 04/15/2021 4.1  2.9 - 4.4 g/dL Final    Alpha-1-Globulin 04/15/2021 0.2  0.0 - 0.4 g/dL Final    Alpha-2-Globulin 04/15/2021 0.7  0.4 - 1.0 g/dL Final    Beta Globulin 04/15/2021 1.3  0.7 - 1.3 g/dL Final    Gamma Globulin 04/15/2021 1.8  0.4 - 1.8 g/dL Final    M-Brant 04/15/2021 Not Observed  Not Observed g/dL Final    Globulin, total 04/15/2021 4.0* 2.2 - 3.9 g/dL Final    A/G ratio 04/15/2021 1.1  0.7 - 1.7   Final    Immunofixation Result 04/15/2021 Comment*   Final    Comment: (NOTE)  Polyclonal increase detected in one or more immunoglobulins.  Free Kappa Lt Chains, serum 04/15/2021 32.1* 3.3 - 19.4 mg/L Final    Free Lambda Lt Chains, serum 04/15/2021 23.6  5.7 - 26.3 mg/L Final    Kappa/Lambda ratio, serum 04/15/2021 1.36  0.26 - 1.65   Final    Comment: (NOTE)  Performed At: [address] [id number]  Penelope Leone MD IA:[id number]     [address] [facility] 04/15/2021        Final    Cholesterol, total 04/15/2021 203* <200 MG/DL Final    Triglyceride 04/15/2021 128  <150 MG/DL Final    Comment: Based on NCEP-ATP III:  Triglycerides <150 mg/dL  is considered normal, 150-199  mg/dL  borderline high,  200-499 mg/dL high and  greater than or equal to 500  mg/dL very high.       HDL Cholesterol 04/15/2021 49  MG/DL Final Comment: Based on NCEP ATP III, HDL Cholesterol <40 mg/dL is considered low and >60  mg/dL is elevated.  LDL, calculated 04/15/2021 128.4* 0 - 100 MG/DL Final    Comment: Based on the NCEP-ATP: LDL-C concentrations are considered  optimal <100 mg/dL,  near optimal/above Normal 100-129 mg/dL Borderline High: 130-159, High: 160-189  mg/dL Very High: Greater than or equal to 190 mg/dL      VLDL, calculated 04/15/2021 25.6  MG/DL Final    CHOL/HDL Ratio 04/15/2021 4.1  0.0 - 5.0   Final    WBC 04/15/2021 6.8  3.6 - 11.0 K/uL Final    RBC 04/15/2021 4.28  3.80 - 5.20 M/uL Final    HGB 04/15/2021 12.1  11.5 - 16.0 g/dL Final    HCT 04/15/2021 39.6  35.0 - 47.0 % Final    MCV 04/15/2021 92.5  80.0 - 99.0 FL Final    MCH 04/15/2021 28.3  26.0 - 34.0 PG Final    MCHC 04/15/2021 30.6  30.0 - 36.5 g/dL Final    RDW 04/15/2021 12.0  11.5 - 14.5 % Final    PLATELET 18/98/0523 620  150 - 400 K/uL Final    MPV 04/15/2021 10.4  8.9 - 12.9 FL Final    NRBC 04/15/2021 0.0  0  WBC Final    ABSOLUTE NRBC 04/15/2021 0.00  0.00 - 0.01 K/uL Final    Sodium 04/15/2021 140  136 - 145 mmol/L Final    Potassium 04/15/2021 4.4  3.5 - 5.1 mmol/L Final    Chloride 04/15/2021 104  97 - 108 mmol/L Final    CO2 04/15/2021 28  21 - 32 mmol/L Final    Anion gap 04/15/2021 8  5 - 15 mmol/L Final    Glucose 04/15/2021 105* 65 - 100 mg/dL Final    BUN 04/15/2021 16  6 - 20 MG/DL Final    Creatinine 04/15/2021 1.08* 0.55 - 1.02 MG/DL Final    BUN/Creatinine ratio 04/15/2021 15  12 - 20   Final    GFR est AA 04/15/2021 >60  >60 ml/min/1.73m2 Final    GFR est non-AA 04/15/2021 50* >60 ml/min/1.73m2 Final    Comment: Estimated GFR is calculated using the IDMS-traceable Modification of Diet in  Renal Disease (MDRD) Study equation, reported for both  Americans  (GFRAA) and non- Americans (GFRNA), and normalized to 1.73m2 body  surface area. The physician must decide which value applies to the patient.  Calcium 04/15/2021 10.3* 8.5 - 10.1 MG/DL Final    Bilirubin, total 04/15/2021 0.6  0.2 - 1.0 MG/DL Final    ALT (SGPT) 04/15/2021 21  12 - 78 U/L Final    AST (SGOT) 04/15/2021 13* 15 - 37 U/L Final    Alk. phosphatase 04/15/2021 75  45 - 117 U/L Final    Protein, total 04/15/2021 8.5* 6.4 - 8.2 g/dL Final    Albumin 04/15/2021 4.4  3.5 - 5.0 g/dL Final    Globulin 04/15/2021 4.1* 2.0 - 4.0 g/dL Final    A-G Ratio 04/15/2021 1.1  1.1 - 2.2   Final       Review of Systems   Constitutional: Negative for activity change, fatigue and unexpected weight change. HENT: Negative for congestion, hearing loss, rhinorrhea and sore throat. Eyes: Negative for discharge. Respiratory: Negative for cough, chest tightness and shortness of breath. Cardiovascular: Negative for leg swelling. Gastrointestinal: Negative for abdominal pain, constipation and diarrhea. Genitourinary: Negative for dysuria, flank pain, frequency and urgency. Musculoskeletal: Negative for arthralgias, back pain and myalgias. Skin: Negative for color change and rash. Neurological: Negative for dizziness, light-headedness and headaches. Psychiatric/Behavioral: Negative for dysphoric mood and sleep disturbance. The patient is not nervous/anxious. Visit Vitals  /85 (BP 1 Location: Right arm)   Pulse 73   Temp 97.3 °F (36.3 °C)   Resp 15   Ht 4' 9\" (1.448 m)   Wt 147 lb 12.8 oz (67 kg)   SpO2 99%   BMI 31.98 kg/m²       Physical Exam  Vitals and nursing note reviewed. Constitutional:       General: She is not in acute distress. Appearance: Normal appearance. She is well-developed. She is not diaphoretic. HENT:      Right Ear: External ear normal.      Left Ear: External ear normal.   Eyes:      General: No scleral icterus. Right eye: No discharge. Left eye: No discharge. Extraocular Movements: Extraocular movements intact.       Conjunctiva/sclera: Conjunctivae normal.   Cardiovascular: Rate and Rhythm: Normal rate and regular rhythm. Pulmonary:      Effort: Pulmonary effort is normal.      Breath sounds: Normal breath sounds. No wheezing. Abdominal:      General: Bowel sounds are normal.      Palpations: Abdomen is soft. Tenderness: There is no abdominal tenderness. Musculoskeletal:      Cervical back: Normal range of motion and neck supple. Lymphadenopathy:      Cervical: No cervical adenopathy. Neurological:      Mental Status: She is alert and oriented to person, place, and time. Psychiatric:         Mood and Affect: Mood and affect normal.             An electronic signature was used to authenticate this note.   -- Haven Scripture

## 2021-11-13 LAB
ALBUMIN SERPL-MCNC: 4.4 G/DL (ref 3.7–4.7)
ALBUMIN/GLOB SERPL: 1.4 {RATIO} (ref 1.2–2.2)
ALP SERPL-CCNC: 72 IU/L (ref 44–121)
ALT SERPL-CCNC: 18 IU/L (ref 0–32)
AST SERPL-CCNC: 16 IU/L (ref 0–40)
BILIRUB SERPL-MCNC: 1 MG/DL (ref 0–1.2)
BUN SERPL-MCNC: 13 MG/DL (ref 8–27)
BUN/CREAT SERPL: 11 (ref 12–28)
CALCIUM SERPL-MCNC: 9.8 MG/DL (ref 8.7–10.3)
CHLORIDE SERPL-SCNC: 106 MMOL/L (ref 96–106)
CHOLEST SERPL-MCNC: 222 MG/DL (ref 100–199)
CO2 SERPL-SCNC: 23 MMOL/L (ref 20–29)
CREAT SERPL-MCNC: 1.15 MG/DL (ref 0.57–1)
ERYTHROCYTE [DISTWIDTH] IN BLOOD BY AUTOMATED COUNT: 11.4 % (ref 11.7–15.4)
GLOBULIN SER CALC-MCNC: 3.2 G/DL (ref 1.5–4.5)
GLUCOSE SERPL-MCNC: 90 MG/DL (ref 65–99)
HCT VFR BLD AUTO: 37.7 % (ref 34–46.6)
HDLC SERPL-MCNC: 48 MG/DL
HGB BLD-MCNC: 12.1 G/DL (ref 11.1–15.9)
LDLC SERPL CALC-MCNC: 158 MG/DL (ref 0–99)
MCH RBC QN AUTO: 28.1 PG (ref 26.6–33)
MCHC RBC AUTO-ENTMCNC: 32.1 G/DL (ref 31.5–35.7)
MCV RBC AUTO: 88 FL (ref 79–97)
PLATELET # BLD AUTO: 289 X10E3/UL (ref 150–450)
POTASSIUM SERPL-SCNC: 4.9 MMOL/L (ref 3.5–5.2)
PROT SERPL-MCNC: 7.6 G/DL (ref 6–8.5)
RBC # BLD AUTO: 4.3 X10E6/UL (ref 3.77–5.28)
SODIUM SERPL-SCNC: 143 MMOL/L (ref 134–144)
TRIGL SERPL-MCNC: 92 MG/DL (ref 0–149)
VLDLC SERPL CALC-MCNC: 16 MG/DL (ref 5–40)
WBC # BLD AUTO: 6.3 X10E3/UL (ref 3.4–10.8)

## 2021-11-14 NOTE — PROGRESS NOTES
Please let her know cholesterol numbers have gone up. Needs to cut down on her Carbs ( pasta, Rice and bread) . Should walk 1 mile daily. Also, her kidney numbers are going up again. She needs to drink 7-8 glasses of water daily. Will repeat labs in 3 months. If cholesterol still high she needs to take a medication.

## 2021-11-15 NOTE — PROGRESS NOTES
Spoke with patient regarding labs, reviewed, and stated to repeat labs in 3 months, patient understood. Asked if she had any questions and she said no.

## 2021-11-26 ENCOUNTER — TRANSCRIBE ORDER (OUTPATIENT)
Dept: SCHEDULING | Age: 71
End: 2021-11-26

## 2021-11-26 DIAGNOSIS — Z12.31 OTHER SCREENING MAMMOGRAM: Primary | ICD-10-CM

## 2022-01-17 ENCOUNTER — OFFICE VISIT (OUTPATIENT)
Dept: PRIMARY CARE CLINIC | Age: 72
End: 2022-01-17
Payer: MEDICAID

## 2022-01-17 VITALS
RESPIRATION RATE: 18 BRPM | DIASTOLIC BLOOD PRESSURE: 86 MMHG | WEIGHT: 150 LBS | TEMPERATURE: 97.3 F | SYSTOLIC BLOOD PRESSURE: 138 MMHG | OXYGEN SATURATION: 98 % | BODY MASS INDEX: 32.36 KG/M2 | HEART RATE: 75 BPM | HEIGHT: 57 IN

## 2022-01-17 DIAGNOSIS — I10 ESSENTIAL HYPERTENSION: ICD-10-CM

## 2022-01-17 DIAGNOSIS — Z01.818 PRE-OP EXAM: ICD-10-CM

## 2022-01-17 DIAGNOSIS — H53.8 BLURRED VISION, BILATERAL: Primary | ICD-10-CM

## 2022-01-17 PROCEDURE — 99213 OFFICE O/P EST LOW 20 MIN: CPT | Performed by: INTERNAL MEDICINE

## 2022-01-17 RX ORDER — LISINOPRIL 10 MG/1
TABLET ORAL
COMMUNITY
End: 2022-01-17 | Stop reason: SDUPTHER

## 2022-01-17 RX ORDER — AMLODIPINE BESYLATE 10 MG/1
1 TABLET ORAL DAILY
COMMUNITY
End: 2022-01-17 | Stop reason: SDUPTHER

## 2022-01-17 RX ORDER — AMLODIPINE BESYLATE 10 MG/1
10 TABLET ORAL DAILY
Qty: 90 TABLET | Refills: 0 | Status: SHIPPED | OUTPATIENT
Start: 2022-01-17 | End: 2022-05-11 | Stop reason: SDUPTHER

## 2022-01-17 RX ORDER — LISINOPRIL 10 MG/1
10 TABLET ORAL DAILY
Qty: 90 TABLET | Refills: 0 | Status: SHIPPED | OUTPATIENT
Start: 2022-01-17 | End: 2022-05-11 | Stop reason: SDUPTHER

## 2022-01-17 NOTE — PROGRESS NOTES
Preoperative Evaluation    Date of Exam: 2022    Roderick Mercado is a 66 y.o. female (:1950) who presents for preoperative evaluation. Latex Allergy: no    Problem List:     Patient Active Problem List    Diagnosis Date Noted    Osteopenia of multiple sites 2021    Eczema 2020    Essential hypertension 2020    Arthritis 2020    Hallux valgus (acquired), right foot 2018    Hallux rigidus of left foot 2016     Medical History:     Past Medical History:   Diagnosis Date    Arthritis     GERD (gastroesophageal reflux disease)     Hemorrhoids 2020    Hypertension     Menopause     LMP-     Allergies: Allergies   Allergen Reactions    Aspirin Other (comments)     \"TIGHTNESS OF CHEST\"      Medications:     Current Outpatient Medications   Medication Sig    lisinopriL (PRINIVIL, ZESTRIL) 10 mg tablet Take 1 Tablet by mouth daily for 90 days.  amLODIPine (NORVASC) 10 mg tablet Take 1 Tablet by mouth daily for 90 days.  hydrocortisone (HYTONE) 2.5 % topical cream Apply  to affected area two (2) times a day. use thin layer (Patient not taking: Reported on 11/3/2021)     No current facility-administered medications for this visit.      Surgical History:     Past Surgical History:   Procedure Laterality Date    COLONOSCOPY N/A 2020    [facility] HX HEMORRHOIDECTOMY      HX OTHER SURGICAL Bilateral     bunionectomy    HX OTHER SURGICAL  2021    EUS,  Rigid sigmoidoscopy, Hemorrhoidectomy-Dr. Nina Cedeno AGAPITO STEREO  BX BREAST LT 1ST LESION W/CLIP AND SPECIMEN Left long ago    Benign    NV ABDOMEN SURGERY PROC UNLISTED      gallbladder     Social History:     Social History     Socioeconomic History    Marital status:    Tobacco Use    Smoking status: Never Smoker    Smokeless tobacco: Never Used    Tobacco comment: \"TRIED A FEW TIMES\"   Vaping Use    Vaping Use: Never used   Substance and Sexual Activity    Alcohol use: No    Drug use: No    Sexual activity: Not Currently       Anesthesia Complications: None  History of abnormal bleeding : None  History of Blood Transfusions: yes  Health Care Directive or Living Will: yes    Objective:     ROS:   Feeling well. No dyspnea or chest pain on exertion. No abdominal pain, change in bowel habits, black or bloody stools. No urinary tract symptoms. GYN ROS:  no abnormal bleeding, pelvic pain or discharge\",\"no breast pain . No neurological complaints. OBJECTIVE:   The patient appears well, alert, oriented x 3, in no distress. Visit Vitals  /86 (BP 1 Location: Right arm, BP Patient Position: Sitting) Comment: manual cuff   Pulse 75   Temp 97.3 °F (36.3 °C) (Temporal)   Resp 18   Ht 4' 9\" (1.448 m)   Wt 150 lb (68 kg)   SpO2 98%   BMI 32.46 kg/m²     HEENT:ENT normal.  Neck supple. No adenopathy or thyromegaly. SUJIT. Chest: Lungs are clear, good air entry, no wheezes, rhonchi or rales. Cardiovascular: S1 and S2 normal, no murmurs, regular rate and rhythm. Abdomen: soft without tenderness, guarding, mass or organomegaly. Extremities: show no edema, normal peripheral pulses. Neurological: is normal, no focal findings. DIAGNOSTICS:     3. Labs:   Lab Results   Component Value Date/Time    WBC 6.3 11/12/2021 09:23 AM    HGB 12.1 11/12/2021 09:23 AM    HCT 37.7 11/12/2021 09:23 AM    PLATELET 397 67/11/6291 09:23 AM    MCV 88 11/12/2021 09:23 AM     Lab Results   Component Value Date/Time    ALT (SGPT) 18 11/12/2021 09:23 AM    Alk.  phosphatase 72 11/12/2021 09:23 AM    Bilirubin, total 1.0 11/12/2021 09:23 AM    Albumin 4.4 11/12/2021 09:23 AM    Protein, total 7.6 11/12/2021 09:23 AM    PLATELET 315 96/72/7345 09:23 AM     Lab Results   Component Value Date/Time    GFR est non-AA 47 (L) 11/12/2021 09:23 AM    GFR est AA 55 (L) 11/12/2021 09:23 AM    Creatinine 1.15 (H) 11/12/2021 09:23 AM    BUN 13 11/12/2021 09:23 AM    Sodium 143 11/12/2021 09:23 AM    Potassium 4.9 11/12/2021 09:23 AM    Chloride 106 11/12/2021 09:23 AM    CO2 23 11/12/2021 09:23 AM       IMPRESSION:   Diagnoses and all orders for this visit:    Blurred vision, bilateral  Scheduled for cataract Sx L eye on 01/18 and R eye on 01/28    Pre-op exam  Cleared for the Sx. No NSAIDS or ASA before the Surgery     Essential hypertension  Take medications in the morning of the surgery. -     lisinopriL (PRINIVIL, ZESTRIL) 10 mg tablet; Take 1 Tablet by mouth daily for 90 days. , Normal, Disp-90 Tablet, R-0  -     amLODIPine (NORVASC) 10 mg tablet; Take 1 Tablet by mouth daily for 90 days. , Normal, Disp-90 Tablet, R-0        Estephanie Post MD   1/17/2022

## 2022-01-17 NOTE — PROGRESS NOTES
Marybel Uribe (: 1950) is a 66 y.o. female, established patient, here for evaluation of the following chief complaint(s):  No chief complaint on file. Written by Shaye Segal, as dictated by Dr. Promise Munson MD.      ASSESSMENT/PLAN:  Below is the assessment and plan developed based on review of pertinent history, physical exam, labs, studies, and medications. {There are no diagnoses linked to this encounter. (Refresh or delete this SmartLink)}      SUBJECTIVE/OBJECTIVE:  HPI    Patient Active Problem List   Diagnosis Code    Hallux rigidus of left foot M20.22    Hallux valgus (acquired), right foot M20.11    Eczema L30.9    Essential hypertension I10    Arthritis M19.90    Osteopenia of multiple sites M85.89        Current Outpatient Medications on File Prior to Visit   Medication Sig Dispense Refill    hydrocortisone (HYTONE) 2.5 % topical cream Apply  to affected area two (2) times a day. use thin layer (Patient not taking: Reported on 11/3/2021) 30 g 0     No current facility-administered medications on file prior to visit.        Allergies   Allergen Reactions    Aspirin Other (comments)     \"TIGHTNESS OF CHEST\"       Past Medical History:   Diagnosis Date    Arthritis     GERD (gastroesophageal reflux disease)     Hemorrhoids 2020    Hypertension     Menopause     LMP-       Past Surgical History:   Procedure Laterality Date    COLONOSCOPY N/A 2020    COLONOSCOPY performed by Sherri Smith MD at [facility] 1827 HX HEMORRHOIDECTOMY      HX OTHER SURGICAL Bilateral     bunionectomy    HX OTHER SURGICAL  2021    EUS,  Rigid sigmoidoscopy, Hemorrhoidectomy-Dr. Manan Schreiber [facility]  BX BREAST LT 1ST LESION W/CLIP AND SPECIMEN Left long ago    Benign    MN ABDOMEN SURGERY PROC UNLISTED      gallbladder       Family History   Problem Relation Age of Onset    Hypertension Mother     Heart Disease Father          OF HEART ATTACK IN EARLY 60'S    Heart Disease Sister     Hypertension Sister     Hypertension Brother     Alcohol abuse Sister     Alcohol abuse Sister     Gout Sister     Stroke Sister     Heart Disease Sister     Other Sister         ADDICTION TO NARCOTICS    No Known Problems Sister     Stroke Sister     Heart Disease Sister     Hypertension Sister     No Known Problems Sister     Cancer Sister         OVARIAN    Anesth Problems Neg Hx        Social History     Socioeconomic History    Marital status:      Spouse name: Not on file    Number of children: Not on file    Years of education: Not on file    Highest education level: Not on file   Occupational History    Not on file   Tobacco Use    Smoking status: Never Smoker    Smokeless tobacco: Never Used    Tobacco comment: \"TRIED A FEW TIMES\"   Vaping Use    Vaping Use: Never used   Substance and Sexual Activity    Alcohol use: No    Drug use: No    Sexual activity: Not Currently   Other Topics Concern    Not on file   Social History Narrative    Not on file       Office Visit on 11/03/2021   Component Date Value Ref Range Status    Glucose 11/12/2021 90  65 - 99 mg/dL Final    BUN 11/12/2021 13  8 - 27 mg/dL Final    Creatinine 11/12/2021 1.15* 0.57 - 1.00 mg/dL Final    GFR est non-AA 11/12/2021 47* >59 mL/min/1.73 Final    GFR est AA 11/12/2021 55* >59 mL/min/1.73 Final    Comment: **In accordance with recommendations from the NKF-ASN Task force,**    [facility] the [facility]GFR calculation to the    2021 CKD-EPI creatinine equation that estimates kidney function    without a race variable.       BUN/Creatinine ratio 11/12/2021 11* 12 - 28 Final    Sodium 11/12/2021 143  134 - 144 mmol/L Final    Potassium 11/12/2021 4.9  3.5 - 5.2 mmol/L Final    Chloride 11/12/2021 106  96 - 106 mmol/L Final    CO2 11/12/2021 23  20 - 29 mmol/L Final    Calcium 11/12/2021 9.8  8.7 - 10.3 mg/dL Final    Protein, total 11/12/2021 7.6  6.0 - 8.5 g/dL Final    Albumin 11/12/2021 4.4  3.7 - 4.7 g/dL Final    GLOBULIN, TOTAL 11/12/2021 3.2  1.5 - 4.5 g/dL Final    A-G Ratio 11/12/2021 1.4  1.2 - 2.2 Final    Bilirubin, total 11/12/2021 1.0  0.0 - 1.2 mg/dL Final    Alk. phosphatase 11/12/2021 72  44 - 121 IU/L Final                  **Please note reference interval change**    AST (SGOT) 11/12/2021 16  0 - 40 IU/L Final    ALT (SGPT) 11/12/2021 18  0 - 32 IU/L Final    WBC 11/12/2021 6.3  3.4 - 10.8 x10E3/uL Final    RBC 11/12/2021 4.30  3.77 - 5.28 x10E6/uL Final    HGB 11/12/2021 12.1  11.1 - 15.9 g/dL Final    HCT 11/12/2021 37.7  34.0 - 46.6 % Final    MCV 11/12/2021 88  79 - 97 fL Final    MCH 11/12/2021 28.1  26.6 - 33.0 pg Final    MCHC 11/12/2021 32.1  31.5 - 35.7 g/dL Final    RDW 11/12/2021 11.4* 11.7 - 15.4 % Final    PLATELET 64/07/3163 900  150 - 450 x10E3/uL Final    Cholesterol, total 11/12/2021 222* 100 - 199 mg/dL Final    Triglyceride 11/12/2021 92  0 - 149 mg/dL Final    HDL Cholesterol 11/12/2021 48  >39 mg/dL Final    VLDL, calculated 11/12/2021 16  5 - 40 mg/dL Final    LDL, calculated 11/12/2021 158* 0 - 99 mg/dL Final      Review of Systems   Constitutional: Negative for activity change, fatigue and unexpected weight change. HENT: Negative for congestion, hearing loss, rhinorrhea and sore throat. Eyes: Negative for discharge. Respiratory: Negative for cough, chest tightness and shortness of breath. Cardiovascular: Negative for leg swelling. Gastrointestinal: Negative for abdominal pain, constipation and diarrhea. Genitourinary: Negative for dysuria, flank pain, frequency and urgency. Musculoskeletal: Negative for arthralgias, back pain and myalgias. Skin: Negative for color change and rash. Neurological: Negative for dizziness, light-headedness and headaches. Psychiatric/Behavioral: Negative for dysphoric mood and sleep disturbance. The patient is not nervous/anxious. There were no vitals taken for this visit. Physical Exam  Vitals and nursing note reviewed. Constitutional:       General: She is not in acute distress. Appearance: Normal appearance. She is well-developed. She is not diaphoretic. HENT:      Right Ear: External ear normal.      Left Ear: External ear normal.   Eyes:      General: No scleral icterus. Right eye: No discharge. Left eye: No discharge. Extraocular Movements: Extraocular movements intact. Conjunctiva/sclera: Conjunctivae normal.   Cardiovascular:      Rate and Rhythm: Normal rate and regular rhythm. Pulmonary:      Effort: Pulmonary effort is normal.      Breath sounds: Normal breath sounds. No wheezing. Abdominal:      General: Bowel sounds are normal.      Palpations: Abdomen is soft. Tenderness: There is no abdominal tenderness. Musculoskeletal:      Cervical back: Normal range of motion and neck supple. Lymphadenopathy:      Cervical: No cervical adenopathy. Neurological:      Mental Status: She is alert and oriented to person, place, and time. Psychiatric:         Mood and Affect: Mood and affect normal.         An electronic signature was used to authenticate this note.   -- Davin Arreaga

## 2022-02-08 ENCOUNTER — HOSPITAL ENCOUNTER (OUTPATIENT)
Dept: MAMMOGRAPHY | Age: 72
Discharge: HOME | End: 2022-02-08
Attending: INTERNAL MEDICINE
Payer: MEDICARE

## 2022-02-08 DIAGNOSIS — Z12.31 OTHER SCREENING MAMMOGRAM: ICD-10-CM

## 2022-02-08 PROCEDURE — 77067 SCR MAMMO BI INCL CAD: CPT

## 2022-03-18 PROBLEM — L30.9 ECZEMA: Status: ACTIVE | Noted: 2020-12-23

## 2022-03-19 PROBLEM — M85.89 OSTEOPENIA OF MULTIPLE SITES: Status: ACTIVE | Noted: 2021-11-03

## 2022-03-19 PROBLEM — I10 ESSENTIAL HYPERTENSION: Status: ACTIVE | Noted: 2020-12-23

## 2022-03-19 PROBLEM — M20.11 HALLUX VALGUS (ACQUIRED), RIGHT FOOT: Status: ACTIVE | Noted: 2018-09-24

## 2022-03-19 PROBLEM — M19.90 ARTHRITIS: Status: ACTIVE | Noted: 2020-12-23

## 2022-05-11 DIAGNOSIS — I10 ESSENTIAL HYPERTENSION: ICD-10-CM

## 2022-05-11 RX ORDER — AMLODIPINE BESYLATE 10 MG/1
10 TABLET ORAL DAILY
Qty: 90 TABLET | Refills: 0 | Status: SHIPPED | OUTPATIENT
Start: 2022-05-11 | End: 2022-08-23 | Stop reason: SDUPTHER

## 2022-05-11 RX ORDER — LISINOPRIL 10 MG/1
10 TABLET ORAL DAILY
Qty: 90 TABLET | Refills: 0 | Status: SHIPPED | OUTPATIENT
Start: 2022-05-11 | End: 2022-08-23 | Stop reason: SDUPTHER

## 2022-05-11 NOTE — TELEPHONE ENCOUNTER
----- Message from [facility] sent at 5/11/2022  9:12 AM EDT -----  Subject: Refill Request    QUESTIONS  Name of Medication? lisinopriL (PRINIVIL, ZESTRIL) 10 mg tablet  Patient-reported dosage and instructions? Once a day  How many days do you have left? 3  Preferred Pharmacy? [address]  Pharmacy phone number (if available)? [phone]  ---------------------------------------------------------------------------  --------------,  Name of Medication? amLODIPine (NORVASC) 5 mg tablet  Patient-reported dosage and instructions? Once a day  How many days do you have left? 3  Preferred Pharmacy? [address]  Pharmacy phone number (if available)? [phone]  Additional Information for Provider? Please call patient if any problems   refilling  ---------------------------------------------------------------------------  --------------  CALL BACK INFO  What is the best way for the office to contact you? OK to leave message on   voicemail  Preferred Call Back Phone Number? [phone]  ---------------------------------------------------------------------------  --------------  SCRIPT ANSWERS  Relationship to Patient?  Self

## 2022-05-11 NOTE — TELEPHONE ENCOUNTER
Requested Prescriptions     Pending Prescriptions Disp Refills    amLODIPine (NORVASC) 10 mg tablet 90 Tablet 0     Sig: Take 1 Tablet by mouth daily for 90 days.  lisinopriL (PRINIVIL, ZESTRIL) 10 mg tablet 90 Tablet 0     Sig: Take 1 Tablet by mouth daily for 90 days.         Last Visit 1/17/22  Last Refill 1/17/22

## 2022-05-25 DIAGNOSIS — Z76.89 ENCOUNTER FOR WEIGHT MANAGEMENT: Primary | ICD-10-CM

## 2022-05-25 DIAGNOSIS — E66.01 MORBID OBESITY (HCC): ICD-10-CM

## 2022-06-07 ENCOUNTER — OFFICE VISIT (OUTPATIENT)
Dept: SURGERY | Age: 72
End: 2022-06-07
Payer: MEDICARE

## 2022-06-07 VITALS
WEIGHT: 149.2 LBS | BODY MASS INDEX: 33.56 KG/M2 | OXYGEN SATURATION: 100 % | TEMPERATURE: 99.3 F | SYSTOLIC BLOOD PRESSURE: 124 MMHG | HEIGHT: 56 IN | DIASTOLIC BLOOD PRESSURE: 86 MMHG | HEART RATE: 87 BPM

## 2022-06-07 DIAGNOSIS — R06.83 SNORING: ICD-10-CM

## 2022-06-07 DIAGNOSIS — Z13.1 SCREENING FOR DIABETES MELLITUS: ICD-10-CM

## 2022-06-07 DIAGNOSIS — I10 ESSENTIAL HYPERTENSION: ICD-10-CM

## 2022-06-07 DIAGNOSIS — E66.09 CLASS 1 OBESITY DUE TO EXCESS CALORIES WITHOUT SERIOUS COMORBIDITY WITH BODY MASS INDEX (BMI) OF 33.0 TO 33.9 IN ADULT: Primary | ICD-10-CM

## 2022-06-07 DIAGNOSIS — E78.00 HYPERCHOLESTEROLEMIA: ICD-10-CM

## 2022-06-07 PROCEDURE — 1101F PT FALLS ASSESS-DOCD LE1/YR: CPT | Performed by: FAMILY MEDICINE

## 2022-06-07 PROCEDURE — G8754 DIAS BP LESS 90: HCPCS | Performed by: FAMILY MEDICINE

## 2022-06-07 PROCEDURE — G8536 NO DOC ELDER MAL SCRN: HCPCS | Performed by: FAMILY MEDICINE

## 2022-06-07 PROCEDURE — G8399 PT W/DXA RESULTS DOCUMENT: HCPCS | Performed by: FAMILY MEDICINE

## 2022-06-07 PROCEDURE — G8417 CALC BMI ABV UP PARAM F/U: HCPCS | Performed by: FAMILY MEDICINE

## 2022-06-07 PROCEDURE — 1090F PRES/ABSN URINE INCON ASSESS: CPT | Performed by: FAMILY MEDICINE

## 2022-06-07 PROCEDURE — 99213 OFFICE O/P EST LOW 20 MIN: CPT | Performed by: FAMILY MEDICINE

## 2022-06-07 PROCEDURE — 1123F ACP DISCUSS/DSCN MKR DOCD: CPT | Performed by: FAMILY MEDICINE

## 2022-06-07 PROCEDURE — G8427 DOCREV CUR MEDS BY ELIG CLIN: HCPCS | Performed by: FAMILY MEDICINE

## 2022-06-07 PROCEDURE — G8752 SYS BP LESS 140: HCPCS | Performed by: FAMILY MEDICINE

## 2022-06-07 PROCEDURE — G8510 SCR DEP NEG, NO PLAN REQD: HCPCS | Performed by: FAMILY MEDICINE

## 2022-06-07 PROCEDURE — G9899 SCRN MAM PERF RSLTS DOC: HCPCS | Performed by: FAMILY MEDICINE

## 2022-06-07 PROCEDURE — 3017F COLORECTAL CA SCREEN DOC REV: CPT | Performed by: FAMILY MEDICINE

## 2022-06-07 NOTE — PROGRESS NOTES
[facility] Progress Note: Initial Physician Visit     Donovan Weiss is a 66 y.o. female who is here for medical screening for entering the [facility].   The patient denies any disease state that requires protein restriction. CC: class 1 Obesity    Referred by self referred reason referred to improve energy and lower cholesterol    Weight History  I personally reviewed the Medical Screening Laureano Michael completed by patient and scanned into media section of chart. It includes duration of their obesity, maximum weight, goal weight  all of which give the context of their obesity AND a Family History of their obesity. Weight loss History  I personally reviewed the Medical Screening Laureano Los [place]olinos completed by the patient and scanned into media section of chart. It includes number of weight loss attempts, the weight loss program that patients was most successful using, and if they have any hx of anorectic medication use, including OTC supplements. Significant Medical History  Past Medical History:   Diagnosis Date    Arthritis     GERD (gastroesophageal reflux disease)     Hemorrhoids 12/28/2020    Hypertension     Menopause     LMP-December, 1999     Patient's medicactions that may contribute none  Plan to become pregant within 6 months no    I personally reviewed the Medical Screening Laureano Los Molinos completed by the patient and scanned into media section of chart. This allows me to assess associated symptoms that are significant in the assessment of the patient's obesity and the patient's Past Medical History. Outpatient Medications Marked as Taking for the 6/7/22 encounter (Office Visit) with Jacob Onofre MD   Medication Sig Dispense Refill    amLODIPine (NORVASC) 10 mg tablet Take 1 Tablet by mouth daily for 90 days. 90 Tablet 0    lisinopriL (PRINIVIL, ZESTRIL) 10 mg tablet Take 1 Tablet by mouth daily for 90 days.  90 Tablet 0       AVG Hours SLEEP: 3-4 hrs    KENYATTA no, but does snore, no headaches when wakes but wakes unrefereshed   CPAP     Significant Psychosocial History   Has a doctor every diagnosed with Binge Eating Disorder, Bulemia or Anorexia? : no     Compliance  Upcoming Travel? no    Social History  Social History     Tobacco Use    Smoking status: Never Smoker    Smokeless tobacco: Never Used    Tobacco comment: \"TRIED A FEW TIMES\"   Substance Use Topics    Alcohol use: No       Exercise  I personally reviewed the Medical Screening Juliana Fix completed by the patient and scanned into media section of chart. MINUTES not consistent and  times a week      Review of Systems  See HPI        Objective  Visit Vitals  /86 (BP 1 Location: Left arm, BP Patient Position: Sitting, BP Cuff Size: Adult)   Pulse 87   Temp 99.3 °F (37.4 °C)   Ht 4' 8\" (1.422 m)   Wt 149 lb 3.2 oz (67.7 kg)   SpO2 100%   BMI 33.45 kg/m²         Weight Metrics 6/7/2022 6/7/2022 1/17/2022 11/3/2021 4/15/2021 4/6/2021 1/27/2021   Weight - 149 lb 3.2 oz 150 lb 147 lb 12.8 oz 146 lb 9.6 oz 143 lb 148 lb 12.8 oz   Neck Circ (inches) 14 - - - - - -   Waist Measure Inches 41 - - - - - -   Body Fat % 43.3 - - - - - -   BMI - 33.45 kg/m2 32.46 kg/m2 31.98 kg/m2 31.72 kg/m2 30.94 kg/m2 32.2 kg/m2       Labs: See  labs scanned into Media section or in lab section of record      Physical Exam    Vital Signs Reviewed  Weight Management Metrics Reviewed    Appearance: well  HEENT:  Scleral icterus?  no  Neck:  Thyromegaly or nodules? no  Mouth: Large tongue not examined  Heart:  RRR  Lungs:  clear  Abdomen:     Hepatomegaly? no   Striae present? no  Skin:    Acne?  no   Hirsutism? no   Skin tags? no   Acanthosis Nigricans?  no  Ext:  Edema? no      Assessment & Plan  Encounter Diagnoses   Name Primary?     Class 1 obesity due to excess calories without serious comorbidity with body mass index (BMI) of 33.0 to 33.9 in adult Yes    Snoring     Essential hypertension  Hypercholesterolemia     Screening for diabetes mellitus        1. labs reviewed w/ patient  2. EKG    3. Medication changes include: none  Diagnoses and all orders for this visit:    1. Class 1 obesity due to excess calories without serious comorbidity with body mass index (BMI) of 33.0 to 33.9 in adult  -     SLEEP MEDICINE REFERRAL  -     METABOLIC PANEL, COMPREHENSIVE; Future  -     LIPID PANEL; Future    2. Snoring  -     SLEEP MEDICINE REFERRAL  She has signs of KENYATTA which is relevant to weight loss  Untreated it leads to resistance to weight loss as well as many other complications  3. Essential hypertension  -     METABOLIC PANEL, COMPREHENSIVE; Future  Good control   cont amlo dipine 10 mg every day and lisinopril 10 mg qd  4. Hypercholesterolemia  -     LIPID PANEL; Future    5. Screening for diabetes mellitus  -     HEMOGLOBIN A1C WITH EAG; Future        Based on his history, labs and EKG, Calderon Campoverde is  a good candidate for the [facility]      time with Kalyan Del Cid consisted of counseling & coordinating and/or discussing treatment plans in reference to her obesity The primary encounter diagnosis was Class 1 obesity due to excess calories without serious comorbidity with body mass index (BMI) of 33.0 to 33.9 in adult. Diagnoses of Snoring, Essential hypertension, Hypercholesterolemia, and Screening for diabetes mellitus were also pertinent to this visit.

## 2022-06-07 NOTE — PROGRESS NOTES
1. Have you been to the ER, urgent care clinic since your last visit? Hospitalized since your last visit? No    2. Have you seen or consulted any other health care providers outside of the [address] [place] [address] since your last visit? Include any pap smears or colon screening.  01/2022 Cataract extraction both eyes

## 2022-06-08 ENCOUNTER — CLINICAL SUPPORT (OUTPATIENT)
Dept: SURGERY | Age: 72
End: 2022-06-08

## 2022-06-08 DIAGNOSIS — E66.09 CLASS 1 OBESITY DUE TO EXCESS CALORIES WITHOUT SERIOUS COMORBIDITY WITH BODY MASS INDEX (BMI) OF 33.0 TO 33.9 IN ADULT: Primary | ICD-10-CM

## 2022-06-08 NOTE — PROGRESS NOTES
[facility]  Metabolic Program Initial Nutrition Consult    Date: 2022   Physician: Avani Pennington MD  Name: Lizbet Mina  :  1950    Type of Plan: LCD  Weeks on Plan: 1 day  Virtual visit was completed through [employer]. ASSESSMENT:      Medications/Supplements:   Prior to Admission medications    Medication Sig Start Date End Date Taking? Authorizing Provider   amLODIPine (NORVASC) 10 mg tablet Take 1 Tablet by mouth daily for 90 days. 22  Marcell Bundy MD   lisinopriL (PRINIVIL, ZESTRIL) 10 mg tablet Take 1 Tablet by mouth daily for 90 days. 22  Marcell Bundy MD   hydrocortisone (HYTONE) 2.5 % topical cream Apply  to affected area two (2) times a day. use thin layer  Patient not taking: Reported on 11/3/2021 4/15/21   Marecll Bundy MD       Food Allergies/Intolerances: lactose intolerant. Anthropometrics:    Ht:4'8\"   Wt: 149#   IBW: <100#    %IBW: 149%    BMI:33    Category: obesity class 1    Pt presents today for initial nutrition consult for the [facility].  Wants to lose weight to feel better, walk with less pain. Goal is 20# loss. Exercise/Physical Activity: none, bike and treadmill    Started meal replacements: yes  If yes, how many per day: had one so far today, goal is to have two per day. Aversions/side effects to meal replacements: none reported    Reported Diet History:   Likes junk food. Likes chips, crackers. Skips sometimes, either not hungry or doesn't think about it. Likes most foods, except dairy. Cooks more at home, rarely out to eat. Goals now are trying to eat more vegetables. Likes salads with ranch but willing to try other olive oil based foods. Beverages: water 4-6 16.9 ounces per day. Celery juice. Plans to have two meal replacements per day at breakfast and lunch.       Environment/Psychosocial/Support: family supportive, had a daughter in the program previously. NUTRITION INTERVENTION:  Pt educated on nutrition recommendations for LCD, specifically 2 meal replacements every day plus a grocery meal and snack. Daily recommended totals: 1200 calories, 60 grams carbs, 80+ grams protein, and remaining calories as healthy fats. Use LCD handout for meal and snack suggestions and preparation. Grocery meal:  Use the balanced plate method to plan meals, include 3-6 oz of lean source of protein, unlimited non-starchy vegetables, 1/2 cup whole grains/beans OR 1/2 cup fruit OR 1 serving of low fat dairy. Utilize handouts listing healthy snack and meal ideas. Read all nutrition labels. Demonstrated and emphasized identifying serving size, total fat, sugar and protein content. Defined low fat as </= 3 g per serving. Discussed lean and extra lean sources of protein. Provided list of low fat cooking methods. Avoid foods with sugar listed in the first 3 ingredients and >/10 g sugar per serving. Practice mindful eating habits; take small bites, chew thoroughly, avoid distractions, utilize hunger/fullness scale. Attend Metabolic Weight Loss Class and Support Group and increase physical activity (approved per MD) for long term weight maintenance. NUTRITION MONITORING AND EVALUATION: Reviewed LCD guidelines, best tips, and safe product use. Adherence likely. Followup PRN. Specific tips and techniques to facilitate compliance with above recommendations were provided and discussed. If further details are desired please contact me at [phone]. This phone number was also provided to the patient for any further questions or concerns.           Leticia Castanon, MS, RD, LDN

## 2022-06-10 LAB
ALBUMIN SERPL-MCNC: 4.8 G/DL (ref 3.7–4.7)
ALBUMIN/GLOB SERPL: 1.5 {RATIO} (ref 1.2–2.2)
ALP SERPL-CCNC: 74 IU/L (ref 44–121)
ALT SERPL-CCNC: 18 IU/L (ref 0–32)
AST SERPL-CCNC: 17 IU/L (ref 0–40)
BILIRUB SERPL-MCNC: 1 MG/DL (ref 0–1.2)
BUN SERPL-MCNC: 23 MG/DL (ref 8–27)
BUN/CREAT SERPL: 22 (ref 12–28)
CALCIUM SERPL-MCNC: 10 MG/DL (ref 8.7–10.3)
CHLORIDE SERPL-SCNC: 102 MMOL/L (ref 96–106)
CHOLEST SERPL-MCNC: 227 MG/DL (ref 100–199)
CO2 SERPL-SCNC: 22 MMOL/L (ref 20–29)
CREAT SERPL-MCNC: 1.03 MG/DL (ref 0.57–1)
EGFR: 57 ML/MIN/1.73
EST. AVERAGE GLUCOSE BLD GHB EST-MCNC: 91 MG/DL
GLOBULIN SER CALC-MCNC: 3.2 G/DL (ref 1.5–4.5)
GLUCOSE SERPL-MCNC: 95 MG/DL (ref 65–99)
HBA1C MFR BLD: 4.8 % (ref 4.8–5.6)
HDLC SERPL-MCNC: 59 MG/DL
LDLC SERPL CALC-MCNC: 148 MG/DL (ref 0–99)
POTASSIUM SERPL-SCNC: 4.7 MMOL/L (ref 3.5–5.2)
PROT SERPL-MCNC: 8 G/DL (ref 6–8.5)
SODIUM SERPL-SCNC: 140 MMOL/L (ref 134–144)
TRIGL SERPL-MCNC: 114 MG/DL (ref 0–149)
VLDLC SERPL CALC-MCNC: 20 MG/DL (ref 5–40)

## 2022-06-15 ENCOUNTER — OFFICE VISIT (OUTPATIENT)
Dept: SURGERY | Age: 72
End: 2022-06-15

## 2022-06-15 DIAGNOSIS — E66.09 CLASS 1 OBESITY DUE TO EXCESS CALORIES WITHOUT SERIOUS COMORBIDITY WITH BODY MASS INDEX (BMI) OF 33.0 TO 33.9 IN ADULT: Primary | ICD-10-CM

## 2022-06-16 NOTE — PROGRESS NOTES
[facility]  Metabolic Weight Loss Program        Patient's Name: Ayden Barnes  : 1950    This patient is enrolled in [address] [place] [address] Weight Loss Program and attended the required weekly virtual nutrition class hosted via [employer].       Concepcion Nicole MS, RD, LDN

## 2022-06-20 ENCOUNTER — CLINICAL SUPPORT (OUTPATIENT)
Dept: SURGERY | Age: 72
End: 2022-06-20

## 2022-06-20 VITALS
BODY MASS INDEX: 32.55 KG/M2 | SYSTOLIC BLOOD PRESSURE: 136 MMHG | HEIGHT: 56 IN | OXYGEN SATURATION: 100 % | HEART RATE: 69 BPM | RESPIRATION RATE: 20 BRPM | WEIGHT: 144.7 LBS | DIASTOLIC BLOOD PRESSURE: 80 MMHG

## 2022-06-20 DIAGNOSIS — R06.83 SNORING: ICD-10-CM

## 2022-06-20 DIAGNOSIS — E78.00 HYPERCHOLESTEROLEMIA: ICD-10-CM

## 2022-06-20 DIAGNOSIS — I10 ESSENTIAL HYPERTENSION: ICD-10-CM

## 2022-06-20 DIAGNOSIS — E66.09 CLASS 1 OBESITY DUE TO EXCESS CALORIES WITHOUT SERIOUS COMORBIDITY WITH BODY MASS INDEX (BMI) OF 33.0 TO 33.9 IN ADULT: Primary | ICD-10-CM

## 2022-06-20 NOTE — PROGRESS NOTES
1. Have you been to the ER, urgent care clinic since your last visit? Hospitalized since your last visit? No    2. Have you seen or consulted any other health care providers outside of the [address] [place] [address] since your last visit? Include any pap smears or colon screening. No       06/10/2022  Progress Note: Weekly Education Class in the [facility]         Patient is on Very Low Calorie Diet [] (4 meal replacements per day, 800 kcal/day)      Low Calorie Diet [x] (2-3 meal replacements per day, 3111-0025 kcal/day)    1) Did patient have any new symptoms or physical problems? Yes []    No [x]    If yes, check & comment: weakness [], fatigue [], lightheadedness [], headache [], cramps [], cold intolerance [], hair loss [], diarrhea [], constipation [],  NA [] other:                2) Has patient had any medical attention from other providers, urgent care or the emergency room this week? Yes []  No [x]       NA [], If yes, why:                      3) Any other sugar sweetened beverages consumed this week? Yes []  No [x]    4) Did patient have any problems adhering to the diet? Yes []  No [x] NA []    If yes, Vacation [], Celebrations [], Conferences [], Family Reunions [] other:                     5) How many hours of sleep this week? 3-4.5  (range)  NA []    Number of meal replacements consumed daily?  (range)  NA [x]    Average ounces of water patient consumed daily this week (not including shakes)? 70 (divide the weekly total by 7)    Did you eat any food outside of the program? Yes [] No [] n/a    Physical Activity Over the Past Week:    Cardio exercise: 60 min  Strength exercise: n/a workouts / week  Number of steps walked per day: (treadmill)? How has patient mood overall been this week?  Sad [], Happy [], Stressed [], Tired [x], Content [], NA [], other good            Medications reconciled by nurse Yes [x]  No[]    Patient was given therapeutic recommendations for any noted side effects of their dietary approach based upon [facility] patient manual per providers recommendation. Progress Note: Weekly Education Class in the [facility]         Patient is on Very Low Calorie Diet [] (4 meal replacements per day, 800 kcal/day)      Low Calorie Diet [x] (2-3 meal replacements per day, 7355-0274 kcal/day)    1) Did patient have any new symptoms or physical problems? Yes []    No [x]    If yes, check & comment: weakness [], fatigue [], lightheadedness [], headache [], cramps [], cold intolerance [], hair loss [], diarrhea [], constipation [],  NA [] other:          2) Has patient had any medical attention from other providers, urgent care or the emergency room this week? Yes []  No [x]       NA [], If yes, why:             3) Any other sugar sweetened beverages consumed this week? Yes [x]  No []    4) Did patient have any problems adhering to the diet? Yes [x]  No [] NA []    If yes, Vacation [], Celebrations [], Conferences [], Family Reunions [] other:             5) How many hours of sleep this week? 4   (range)  NA []    Number of meal replacements consumed daily?  (range)  NA [x]    Average ounces of water patient consumed daily this week (not including shakes)? 70   (divide the weekly total by 7)    Did you eat any food outside of the program? Yes [x] No []    Physical Activity Over the Past Week:    Cardio exercise: 170 min  Strength exercise: 7 workouts / week  Number of steps walked per day: \"treadmill\"    How has patient mood overall been this week? Sad [], Happy [], Stressed [], Tired [x], Content [], NA [], other  Good,           Medications reconciled by nurse Yes [x]  No[]    Patient was given therapeutic recommendations for any noted side effects of their dietary approach based upon [facility] patient manual per providers recommendation.

## 2022-06-20 NOTE — PROGRESS NOTES
The kidney test is better now than it was 7 months ago  The cholesterol needs improvement.  The changes we are working on will help lower the cholesterol  The blood sugar test is normal

## 2022-07-01 NOTE — PROGRESS NOTES
Nurse note from patient's weekly  LCD  class was reviewed. Pertinent medical concerns were:   Down 5 lbs     BP Readings from Last 3 Encounters:   06/20/22 136/80   06/07/22 124/86   01/17/22 138/86       Weight Metrics 6/20/2022 6/7/2022 6/7/2022 1/17/2022 11/3/2021 4/15/2021 4/6/2021   Weight 144 lb 11.2 oz - 149 lb 3.2 oz 150 lb 147 lb 12.8 oz 146 lb 9.6 oz 143 lb   Neck Circ (inches) - 14 - - - - -   Waist Measure Inches - 41 - - - - -   Body Fat % - 43.3 - - - - -   BMI 32.44 kg/m2 - 33.45 kg/m2 32.46 kg/m2 31.98 kg/m2 31.72 kg/m2 30.94 kg/m2       Current Outpatient Medications   Medication Sig Dispense Refill    amLODIPine (NORVASC) 10 mg tablet Take 1 Tablet by mouth daily for 90 days. 90 Tablet 0    lisinopriL (PRINIVIL, ZESTRIL) 10 mg tablet Take 1 Tablet by mouth daily for 90 days. 90 Tablet 0    hydrocortisone (HYTONE) 2.5 % topical cream Apply  to affected area two (2) times a day.  use thin layer (Patient not taking: Reported on 11/3/2021) 30 g 0

## 2022-07-05 ENCOUNTER — OFFICE VISIT (OUTPATIENT)
Dept: SURGERY | Age: 72
End: 2022-07-05
Payer: MEDICAID

## 2022-07-05 VITALS
RESPIRATION RATE: 18 BRPM | WEIGHT: 144.5 LBS | HEART RATE: 72 BPM | BODY MASS INDEX: 32.5 KG/M2 | TEMPERATURE: 98.3 F | SYSTOLIC BLOOD PRESSURE: 114 MMHG | OXYGEN SATURATION: 96 % | HEIGHT: 56 IN | DIASTOLIC BLOOD PRESSURE: 70 MMHG

## 2022-07-05 DIAGNOSIS — E78.00 HYPERCHOLESTEROLEMIA: ICD-10-CM

## 2022-07-05 DIAGNOSIS — R06.83 SNORING: ICD-10-CM

## 2022-07-05 DIAGNOSIS — I10 ESSENTIAL HYPERTENSION: ICD-10-CM

## 2022-07-05 DIAGNOSIS — E66.09 CLASS 1 OBESITY DUE TO EXCESS CALORIES WITHOUT SERIOUS COMORBIDITY WITH BODY MASS INDEX (BMI) OF 33.0 TO 33.9 IN ADULT: Primary | ICD-10-CM

## 2022-07-05 PROCEDURE — 1101F PT FALLS ASSESS-DOCD LE1/YR: CPT | Performed by: FAMILY MEDICINE

## 2022-07-05 PROCEDURE — 99214 OFFICE O/P EST MOD 30 MIN: CPT | Performed by: FAMILY MEDICINE

## 2022-07-05 PROCEDURE — G8399 PT W/DXA RESULTS DOCUMENT: HCPCS | Performed by: FAMILY MEDICINE

## 2022-07-05 PROCEDURE — G8427 DOCREV CUR MEDS BY ELIG CLIN: HCPCS | Performed by: FAMILY MEDICINE

## 2022-07-05 PROCEDURE — G8536 NO DOC ELDER MAL SCRN: HCPCS | Performed by: FAMILY MEDICINE

## 2022-07-05 PROCEDURE — G8417 CALC BMI ABV UP PARAM F/U: HCPCS | Performed by: FAMILY MEDICINE

## 2022-07-05 PROCEDURE — 1090F PRES/ABSN URINE INCON ASSESS: CPT | Performed by: FAMILY MEDICINE

## 2022-07-05 PROCEDURE — 1123F ACP DISCUSS/DSCN MKR DOCD: CPT | Performed by: FAMILY MEDICINE

## 2022-07-05 PROCEDURE — G8432 DEP SCR NOT DOC, RNG: HCPCS | Performed by: FAMILY MEDICINE

## 2022-07-05 PROCEDURE — G8754 DIAS BP LESS 90: HCPCS | Performed by: FAMILY MEDICINE

## 2022-07-05 PROCEDURE — G8752 SYS BP LESS 140: HCPCS | Performed by: FAMILY MEDICINE

## 2022-07-05 PROCEDURE — G9899 SCRN MAM PERF RSLTS DOC: HCPCS | Performed by: FAMILY MEDICINE

## 2022-07-05 PROCEDURE — 3017F COLORECTAL CA SCREEN DOC REV: CPT | Performed by: FAMILY MEDICINE

## 2022-07-05 RX ORDER — BETAMETHASONE DIPROPIONATE 0.5 MG/G
CREAM TOPICAL AS NEEDED
COMMUNITY
Start: 2022-06-28 | End: 2022-09-07 | Stop reason: ALTCHOICE

## 2022-07-05 RX ORDER — HYDROQUINONE 40 MG/G
CREAM TOPICAL
COMMUNITY
Start: 2022-06-16 | End: 2022-09-07 | Stop reason: ALTCHOICE

## 2022-07-05 NOTE — PROGRESS NOTES
Weight Management. 1 month follow up. 1. Have you been to the ER, urgent care clinic since your last visit? Hospitalized since your last visit? No    2. Have you seen or consulted any other health care providers outside of the [address] [place] [address] since your last visit? Include any pap smears or colon screening.  No     BMI - 32.0

## 2022-07-05 NOTE — PROGRESS NOTES
[facility] Progress Note:   F/up Physician Visit    CC: Weight Management      Dennys Walker is a 66 y.o. female who is here for her  f/up physician visit for the  LCD Program.  She is not having the protein drinks each day  She is eating regular meals but little carbs    Weight Metrics 7/5/2022 7/5/2022 6/20/2022 6/7/2022 6/7/2022 1/17/2022 11/3/2021   Weight - 144 lb 8 oz 144 lb 11.2 oz - 149 lb 3.2 oz 150 lb 147 lb 12.8 oz   Neck Circ (inches) 13.5 - - 14 - - -   Waist Measure Inches 32.5 - - 41 - - -   Body Fat % 43.5 - - 43.3 - - -   BMI - 32.4 kg/m2 32.44 kg/m2 - 33.45 kg/m2 32.46 kg/m2 31.98 kg/m2         Outpatient Medications Marked as Taking for the 7/5/22 encounter (Office Visit) with Jericho Reyes MD   Medication Sig Dispense Refill    betamethasone dipropionate (DIPROSONE) 0.05 % topical cream Apply  to affected area as needed.  hydroquinone (ESOTERICA, MELQUIN) 4 % topical cream APPLY TO DARK AREAS OF SKIN TWICE DAILY      amLODIPine (NORVASC) 10 mg tablet Take 1 Tablet by mouth daily for 90 days. 90 Tablet 0    lisinopriL (PRINIVIL, ZESTRIL) 10 mg tablet Take 1 Tablet by mouth daily for 90 days. 90 Tablet 0    hydrocortisone (HYTONE) 2.5 % topical cream Apply  to affected area two (2) times a day. use thin layer (Patient taking differently: Apply  to affected area as needed. use thin layer) 30 g 0         Participation   Did you attend clinic and class last week? Yes, some days she calls in and no one is there    Review of Systems  Since your last visit, have you experienced any complications? no  If yes, please list:       Are you taking an appetite suppressant? no  If so, is there any Chest Pain, Palpitations or Dizziness?       HUNGER CONTROL: fair    BP Readings from Last 3 Encounters:   07/05/22 114/70   06/20/22 136/80   06/07/22 124/86       SLEEP: 4, which she says is an improvement    Have you received any other medical care this week? no  If yes, where and for what? Have you discontinued or changed any medicine or dose of your medicine since your last visit with Dr Rylee Butler? no  If yes, where and for what? Diet  How many ounces of calorie-free liquids did you consume each day? At least 4-5 bottle of 16 oz    How many meal replacements did you take each day? Varies, sometimes none    Did you have any problems adhering to the program?  yes If yes, please explain:      Exercise  Aerobic exercise: 10-15 most days of the week on TM or body shaker min  Resistance exercise:  workouts / week  Any discomfort?  no     If yes, where? Objective  Visit Vitals  /70 (BP 1 Location: Right arm, BP Patient Position: Sitting, BP Cuff Size: Adult)   Pulse 72   Temp 98.3 °F (36.8 °C) (Oral)   Resp 18   Ht 4' 8\" (1.422 m)   Wt 144 lb 8 oz (65.5 kg)   SpO2 96%   BMI 32.40 kg/m²     No LMP recorded. Patient is postmenopausal.      Physical Exam  Appearance: well,   Mental:A&O x 3, NAD  H:NC/AT,  EENT:   EOMI, PERRL, No scleral icterus  Neck: no bruit or JVD  Lung: clear, No W/R  ABD: soft, active, nontender  Ext:  no Edema  Neuro: nonfocal  Assessment / Plan    Encounter Diagnoses   Name Primary?  Class 1 obesity due to excess calories without serious comorbidity with body mass index (BMI) of 33.0 to 33.9 in adult Yes    Essential hypertension     Hypercholesterolemia     Snoring      Diagnoses and all orders for this visit:    1. Class 1 obesity due to excess calories without serious comorbidity with body mass index (BMI) of 33.0 to 33.9 in adult  She is not compliant w the LCD   meet w dietitian to either switch to grocery template or discuss ways to use the rec ipe book to modify the replacements to her liking  Increase exercise to 20 min at least 4 days  Week  Increase sleep to 5 hours each day    2. Essential hypertension  Cont the amlodipine 10 mg and lisinopril 10 mg  No change in dose  3.  Hypercholesterolemia  The diet and exercise is her treatment plan  4. Snoring  Referred to sleep study. She has  made the appt    1. Weight management improved   Progress was reviewed with patient    2. Labs    Latest results reviewed with patient       face to face time with Owen Li consisted of counseling & coordinating and/or discussing treatment plans in reference to her obesity The primary encounter diagnosis was Class 1 obesity due to excess calories without serious comorbidity with body mass index (BMI) of 33.0 to 33.9 in adult. Diagnoses of Essential hypertension, Hypercholesterolemia, and Snoring were also pertinent to this visit.

## 2022-07-06 NOTE — PROGRESS NOTES
6/21/2022    Progress Note: Weekly Education Class in the [facility]         Patient is on Very Low Calorie Diet [] (4 meal replacements per day, 800 kcal/day)      Low Calorie Diet [x] (2-3 meal replacements per day, 1835-9662 kcal/day)    1) Did patient have any new symptoms or physical problems? Yes []    No [x]    If yes, check & comment: weakness [], fatigue [], lightheadedness [], headache [], cramps [], cold intolerance [], hair loss [], diarrhea [], constipation [],  NA [] other:                                 2) Has patient had any medical attention from other providers, urgent care or the emergency room this week? Yes []  No [x]       NA [], If yes, why:                                      3) Any other sugar sweetened beverages consumed this week? Yes []  No [x]    4) Did patient have any problems adhering to the diet? Yes []  No [x] NA []    If yes, Vacation [], Celebrations [], Conferences [], Family Reunions [] other:                                                5) How many hours of sleep this week? 3-4    (range)  NA []    Number of meal replacements consumed daily? 0  (range)  NA []    Average ounces of water patient consumed daily this week (not including shakes)? 72     (divide the weekly total by 7)    Did you eat any food outside of the program? Yes [] No [x]    Physical Activity Over the Past Week:    Cardio exercise: 50 min  Strength exercise: 0 workouts / week  Number of steps walked per day: 50 mins    How has patient mood overall been this week? Sad [], Happy [], Stressed [], Tired [], Content [], NA [], other  Good           Medications reconciled by nurse Yes [x]  No[]    Patient was given therapeutic recommendations for any noted side effects of their dietary approach based upon [facility] patient manual per providers recommendation.      6/27/2022    Progress Note: Weekly Education Class in the [facility]         Patient is on Very Low Calorie Diet [] (4 meal replacements per day, 800 kcal/day)      Low Calorie Diet [x] (2-3 meal replacements per day, 8846-1374 kcal/day)    1) Did patient have any new symptoms or physical problems? Yes []    No [x]    If yes, check & comment: weakness [], fatigue [], lightheadedness [], headache [], cramps [], cold intolerance [], hair loss [], diarrhea [], constipation [],  NA [] other:                                 2) Has patient had any medical attention from other providers, urgent care or the emergency room this week? Yes []  No [x]       NA [], If yes, why:                                      3) Any other sugar sweetened beverages consumed this week? Yes []  No [x]    4) Did patient have any problems adhering to the diet? Yes []  No [x] NA []    If yes, Vacation [], Celebrations [], Conferences [], Family Reunions [] other:                                                5) How many hours of sleep this week? 3-5    (range)  NA []    Number of meal replacements consumed daily? 4  (range)  NA []    Average ounces of water patient consumed daily this week (not including shakes)? 65     (divide the weekly total by 7)    Did you eat any food outside of the program? Yes [] No [x]    Physical Activity Over the Past Week:    Cardio exercise: 0 min  Strength exercise: 0 workouts / week  Number of steps walked per day: 95 mins    How has patient mood overall been this week? Sad [], Happy [], Stressed [], Tired [], Content [], NA [], other NOT ANSWERED             Medications reconciled by nurse Yes [x]  No[]    Patient was given therapeutic recommendations for any noted side effects of their dietary approach based upon [facility] patient manual per providers recommendation.

## 2022-07-19 ENCOUNTER — DOCUMENTATION ONLY (OUTPATIENT)
Dept: SLEEP MEDICINE | Age: 72
End: 2022-07-19

## 2022-07-19 ENCOUNTER — OFFICE VISIT (OUTPATIENT)
Dept: SLEEP MEDICINE | Age: 72
End: 2022-07-19
Payer: MEDICAID

## 2022-07-19 VITALS
HEART RATE: 74 BPM | HEIGHT: 56 IN | OXYGEN SATURATION: 99 % | RESPIRATION RATE: 20 BRPM | DIASTOLIC BLOOD PRESSURE: 75 MMHG | SYSTOLIC BLOOD PRESSURE: 121 MMHG | TEMPERATURE: 97.7 F | BODY MASS INDEX: 33.25 KG/M2 | WEIGHT: 147.8 LBS

## 2022-07-19 DIAGNOSIS — I10 ESSENTIAL HYPERTENSION: ICD-10-CM

## 2022-07-19 DIAGNOSIS — G47.33 OBSTRUCTIVE SLEEP APNEA (ADULT) (PEDIATRIC): Primary | ICD-10-CM

## 2022-07-19 PROCEDURE — 99204 OFFICE O/P NEW MOD 45 MIN: CPT | Performed by: INTERNAL MEDICINE

## 2022-07-19 PROCEDURE — 1123F ACP DISCUSS/DSCN MKR DOCD: CPT | Performed by: INTERNAL MEDICINE

## 2022-07-19 NOTE — Clinical Note
Thank you for the referral.  I will keep you informed of her progress.   [address],  Stephanie Rivera

## 2022-07-19 NOTE — PATIENT INSTRUCTIONS
[address] [facility] [place]. [place], [address]  Tel.  [phone]  Fax. [address] [facility] [address]  [place], [address] [facility]  Tel.  [phone]  Fax. [phone] [address]  Tel.  [phone]  Fax. [phone]     Sleep Apnea: After Your Visit  Your Care Instructions  Sleep apnea occurs when you frequently stop breathing for 10 seconds or longer during sleep. It can be mild to severe, based on the number of times per hour that you stop breathing or have slowed breathing. Blocked or narrowed airways in your nose, mouth, or throat can cause sleep apnea. Your airway can become blocked when your throat muscles and tongue relax during sleep. Sleep apnea is common, occurring in 1 out of 20 individuals. Individuals having any of the following characteristics should be evaluated and treated right away due to high risk and detrimental consequences from untreated sleep apnea:  1. Obesity  2. Congestive Heart failure  3. Atrial Fibrillation  4. Uncontrolled Hypertension  5. Type II Diabetes  6. Night-time Arrhythmias  7. Stroke  8. Pulmonary Hypertension  9. High-risk Driving Populations (pilots, truck drivers, etc.)  10. Patients Considering Weight-loss Surgery    How do you know you have sleep apnea? You probably have sleep apnea if you answer 'yes' to 3 or more of the following questions:  S - Have you been told that you Snore? T - Are you often Tired during the day? O - Has anyone Observed you stop breathing while sleeping? P- Do you have (or are being treated for) high blood Pressure? B - Are you obese (Body Mass Index > 35)? A - Is your Age 48years old or older? N - Is your Neck size greater than 16 inches? G - Are you male Gender? A sleep physician can prescribe a breathing device that prevents tissues in the throat from blocking your airway.  Or your doctor may recommend using a dental device (oral breathing device) to help keep your airway open. In some cases, surgery may be needed to remove enlarged tissues in the throat. Follow-up care is a key part of your treatment and safety. Be sure to make and go to all appointments, and call your doctor if you are having problems. It's also a good idea to know your test results and keep a list of the medicines you take. How can you care for yourself at home? · Lose weight, if needed. It may reduce the number of times you stop breathing or have slowed breathing. · Go to bed at the same time every night. · Sleep on your side. It may stop mild apnea. If you tend to roll onto your back, sew a pocket in the back of your pajama top. Put a tennis ball into the pocket, and stitch the pocket shut. This will help keep you from sleeping on your back. · Avoid alcohol and medicines such as sleeping pills and sedatives before bed. · Do not smoke. Smoking can make sleep apnea worse. If you need help quitting, talk to your doctor about stop-smoking programs and medicines. These can increase your chances of quitting for good. · Prop up the head of your bed 4 to 6 inches by putting bricks under the legs of the bed. · Treat breathing problems, such as a stuffy nose, caused by a cold or allergies. · Use a continuous positive airway pressure (CPAP) breathing machine if lifestyle changes do not help your apnea and your doctor recommends it. The machine keeps your airway from closing when you sleep. · If CPAP does not help you, ask your doctor whether you should try other breathing machines. A bilevel positive airway pressure machine has two types of air pressureâone for breathing in and one for breathing out. Another device raises or lowers air pressure as needed while you breathe. · If your nose feels dry or bleeds when using one of these machines, talk with your doctor about increasing moisture in the air. A humidifier may help.   · If your nose is runny or stuffy from using a breathing machine, talk with your doctor about using decongestants or a corticosteroid nasal spray. When should you call for help? Watch closely for changes in your health, and be sure to contact your doctor if:  · You still have sleep apnea even though you have made lifestyle changes. · You are thinking of trying a device such as CPAP. · You are having problems using a CPAP or similar machine. Where can you learn more? Go to [employer]ka. Enter V395 in the search box to learn more about \"Sleep Apnea: After Your Visit. \"   © 6253-7517 Healthwise, Incorporated. Care instructions adapted under license by [address] [place] [address] [employer] (which disclaims liability or warranty for this information). This care instruction is for use with your licensed healthcare professional. If you have questions about a medical condition or this instruction, always ask your healthcare professional. Eric Olivarez any warranty or liability for your use of this information. PROPER SLEEP HYGIENE    What to avoid  · Do not have drinks with caffeine, such as coffee or black tea, for 8 hours before bed. · Do not smoke or use other types of tobacco near bedtime. Nicotine is a stimulant and can keep you awake. · Avoid drinking alcohol late in the evening, because it can cause you to wake in the middle of the night. · Do not eat a big meal close to bedtime. If you are hungry, eat a light snack. · Do not drink a lot of water close to bedtime, because the need to urinate may wake you up during the night. · Do not read or watch TV in bed. Use the bed only for sleeping and sexual activity. What to try  · Go to bed at the same time every night, and wake up at the same time every morning. Do not take naps during the day. · Keep your bedroom quiet, dark, and cool. · Get regular exercise, but not within 3 to 4 hours of your bedtime. .  · Sleep on a comfortable pillow and mattress.   · If watching the clock makes you anxious, turn it facing away from you so you cannot see the time. · If you worry when you lie down, start a worry book. Well before bedtime, write down your worries, and then set the book and your concerns aside. · Try meditation or other relaxation techniques before you go to bed. · If you cannot fall asleep, get up and go to another room until you feel sleepy. Do something relaxing. Repeat your bedtime routine before you go to bed again. · Make your house quiet and calm about an hour before bedtime. Turn down the lights, turn off the TV, log off the computer, and turn down the volume on music. This can help you relax after a busy day. Drowsy Driving  The [address] [place] [address] Road Traffic Safety Administration cites drowsiness as a causing factor in more than 879,849 police reported crashes annually, resulting in 76,000 injuries and 1,500 deaths. Other surveys suggest 55% of people polled have driven while drowsy in the past year, 23% had fallen asleep but not crashed, 3% crashed, and 2% had and accident due to drowsy driving. Who is at risk? Young Drivers: One study of drowsy driving accidents states that 55% of the drivers were under 25 years. Of those, 75% were male. Shift Workers and Travelers: People who work overnight or travel across time zones frequently are at higher risk of experiencing Circadian Rhythm Disorders. They are trying to work and function when their body is programed to sleep. Sleep Deprived: Lack of sleep has a serious impact on your ability to pay attention or focus on a task. Consistently getting less than the average of 8 hours your body needs creates partial or cumulative sleep deprivation. Untreated Sleep Disorders: Sleep Apnea, Narcolepsy, R.L.S., and other sleep disorders (untreated) prevent a person from getting enough restful sleep. This leads to excessive daytime sleepiness and increases the risk for drowsy driving accidents by up to 7 times.   Medications / Alcohol: Even over the counter medications can cause drowsiness. Medications that impair a drivers attention should have a warning label. Alcohol naturally makes you sleepy and on its own can cause accidents. Combined with excessive drowsiness its effects are amplified. Signs of Drowsy Driving:   * You don't remember driving the last few miles   * You may drift out of your julianna   * You are unable to focus and your thoughts wander   * You may yawn more often than normal   * You have difficulty keeping your eyes open / nodding off   * Missing traffic signs, speeding, or tailgating  Prevention-   Good sleep hygiene, lifestyle and behavioral choices have the most impact on drowsy driving. There is no substitute for sleep and the average person requires 8 hours nightly. If you find yourself driving drowsy, stop and sleep. Consider the sleep hygiene tips provided during your visit as well. Medication Refill Policy: Refills for all medications require 1 week advance notice. Please have your pharmacy fax a refill request. We are unable to fax, or call in \"controled substance\" medications and you will need to pick these prescriptions up from our office. [facility] Activation    Thank you for requesting access to [facility]. Please follow the instructions below to securely access and download your online medical record. [facility] allows you to send messages to your doctor, view your test results, renew your prescriptions, schedule appointments, and more. How Do I Sign Up? 1. In your internet browser, go to [email or web address]. 2. Click on the First Time User? Click Here link in the Sign In box. You will see the New Member Sign Up page. 3. Enter your [facility] Access Code exactly as it appears below. You will not need to use this code after youve completed the sign-up process. If you do not sign up before the expiration date, you must request a new code. [facility] Access Code:  Activation code not generated  Current [facility] Status: Active (This is the date your [employer] access code will )    4. Enter the last four digits of your Social Security Number (xxxx) and Date of Birth (mm/dd/yyyy) as indicated and click Submit. You will be taken to the next sign-up page. 5. Create a Silver [employer]vet ID. This will be your [employer] login ID and cannot be changed, so think of one that is secure and easy to remember. 6. Create a [employer] password. You can change your password at any time. 7. Enter your Password Reset Question and Answer. This can be used at a later time if you forget your password. 8. Enter your e-mail address. You will receive e-mail notification when new information is available in [address] 9. Click Sign Up. You can now view and download portions of your medical record. 10. Click the Download Summary menu link to download a portable copy of your medical information. Additional Information    If you have questions, please call [phone]. Remember, [employer] is NOT to be used for urgent needs. For medical emergencies, dial 911.

## 2022-07-19 NOTE — PROGRESS NOTES
[address] [facility] [place]. [place], [address]  Tel.  [phone]  Fax. 100 [facility] 60  [facility], [address] [facility]  Tel.  [phone]  Fax. [phone] [address]  Tel.  [phone]  Fax. [phone]         Subjective:      Debbi Lemon is an 66 y.o. female referred for evaluation for a sleep disorder. She complains of snoring, feels tired at times associated with she was advised to be evaluated for sleep apnea. Symptoms began a few years ago, unchanged since that time. She usually can fall asleep in 15-20 minutes. She denies falling asleep while driving. Debbi Lemon does wake up frequently at night. She is bothered by waking up too early and left unable to get back to sleep. She actually sleeps about 4 hours at night and wakes up about    2-3 times during the night. She does not work shifts: Hadley Dobson indicates she does get too little sleep at night. Her bedtime is 2100. She awakens at 6-7 am  She does take naps. She takes 4 naps a week lasting 15 to 30. She has the following observed behaviors: Loud snoring;  . Other remarks:      [place] Sleepiness Score: 2      Allergies   Allergen Reactions    Aspirin Other (comments)     \"TIGHTNESS OF CHEST\"         Current Outpatient Medications:     betamethasone dipropionate (DIPROSONE) 0.05 % topical cream, Apply  to affected area as needed. , Disp: , Rfl:     hydroquinone (ESOTERICA, MELQUIN) 4 % topical cream, APPLY TO DARK AREAS OF SKIN TWICE DAILY, Disp: , Rfl:     amLODIPine (NORVASC) 10 mg tablet, Take 1 Tablet by mouth daily for 90 days. , Disp: 90 Tablet, Rfl: 0    lisinopriL (PRINIVIL, ZESTRIL) 10 mg tablet, Take 1 Tablet by mouth daily for 90 days. , Disp: 90 Tablet, Rfl: 0     She  has a past medical history of Arthritis, GERD (gastroesophageal reflux disease), Hemorrhoids (12/28/2020), Hypertension, and Menopause.     She  has a past surgical history that includes pr abdomen surgery proc unlisted; colonoscopy (N/A, 2/4/2020); theo stereo  bx breast lt 1st lesion w/clip and specimen (Left, long ago); hx other surgical (Bilateral); hx other surgical (01/12/2021); and hx hemorrhoidectomy. She family history includes Alcohol abuse in her sister and sister; Cancer in her sister; Gout in her sister; Heart Disease in her father, sister, sister, and sister; Hypertension in her brother, mother, sister, and sister; No Known Problems in her sister and sister; Other in her sister; Stroke in her sister and sister. She  reports that she has never smoked. She has never used smokeless tobacco. She reports that she does not drink alcohol and does not use drugs. Review of Systems:  Constitutional:  Recently lost a few pounds. She is working with Dr. Guicho Cannon on weight loss  Eyes:  No blurred vision.   CVS:  No significant chest pain  Pulm:  No significant shortness of breath  GI:  No significant nausea or vomiting  :  No significant nocturia  Musculoskeletal:  some joint pain at night  Skin:  No significant rashes  Neuro:  No significant dizziness   Psych:  No active mood issues    Sleep Review of Systems: notable for no difficulty falling asleep; +frequent awakenings at night;  occasional dreaming noted; no nightmares ; rare early morning headaches; no memory problems; no concentration issues       Objective:     Visit Vitals  /75 (BP 1 Location: Right arm, BP Patient Position: Sitting, BP Cuff Size: Small adult)   Pulse 74   Temp 97.7 °F (36.5 °C) (Temporal)   Resp 20   Ht 4' 8\" (1.422 m)   Wt 147 lb 12.8 oz (67 kg)   SpO2 99%   BMI 33.14 kg/m²         General:   Not in acute distress   Eyes:  Anicteric sclerae, no obvious strabismus   Nose:  No obvious nasal septum deviation    Oropharynx:   Class 3 oropharyngeal outlet, thick tongue base, enlarged and boggy uvula, low-lying soft palate, narrow tonsilo-pharyngeal pilars   Tonsils:   tonsils are present and normal   Neck:    ; midline trachea   Chest/Lungs:  Equal lung expansion, clear on auscultation    CVS:  Normal rate, regular rhythm; no JVD   Skin:  Warm to touch; no obvious rashes   Neuro:  No focal deficits ; no obvious tremor    Psych:  Normal affect,  normal countenance;          Assessment:       ICD-10-CM ICD-9-CM    1. Obstructive sleep apnea (adult) (pediatric)  G47.33 327.23 SLEEP STUDY UNATTENDED, 4 CHANNEL   2. Essential hypertension  I10 401.9          Plan:     * The patient currently has a Moderate Risk for having sleep apnea. STOP-BANG score 3.  * HSAT was ordered for initial evaluation. she strongly prefers a home sleep apnea test.  Treatment options for sleep apnea were reviewed. she would like to proceed with a trial of PAP if found to have significant apnea. * She was provided information on sleep apnea including coresponding risk factors and the importance of proper treatment. * Counseling was provided regarding proper sleep hygiene and safe driving. I have counseled the patient regarding the benefits of weight loss. 2. Hypertension - she continues on her current regimen. I have reviewed the relationship between hypertension as it relates to sleep-disordered breathing. The treatment plan was reviewed with the patient in detail . she understands that the lead technologist will be calling her  with the results or notifying of results via [address] [place] [address] and assisting with the next step in the treatment plan as outlined today during the consultation with me. All of her questions were addressed. Thank you for allowing us to participate in your patient's medical care. We'll keep you updated on these investigations.   Electronically signed by    Tesfaye Brody MD  Diplomate in Sleep Medicine  [facility]    7/19/2022

## 2022-07-20 ENCOUNTER — CLINICAL SUPPORT (OUTPATIENT)
Dept: SURGERY | Age: 72
End: 2022-07-20

## 2022-07-20 VITALS
TEMPERATURE: 98 F | RESPIRATION RATE: 20 BRPM | HEIGHT: 56 IN | BODY MASS INDEX: 32.75 KG/M2 | HEART RATE: 71 BPM | WEIGHT: 145.6 LBS | SYSTOLIC BLOOD PRESSURE: 114 MMHG | OXYGEN SATURATION: 98 % | DIASTOLIC BLOOD PRESSURE: 74 MMHG

## 2022-07-20 DIAGNOSIS — E78.00 HYPERCHOLESTEROLEMIA: ICD-10-CM

## 2022-07-20 DIAGNOSIS — I10 ESSENTIAL HYPERTENSION: ICD-10-CM

## 2022-07-20 DIAGNOSIS — E66.09 CLASS 1 OBESITY DUE TO EXCESS CALORIES WITHOUT SERIOUS COMORBIDITY WITH BODY MASS INDEX (BMI) OF 33.0 TO 33.9 IN ADULT: Primary | ICD-10-CM

## 2022-07-20 NOTE — PROGRESS NOTES
1. Have you been to the ER, urgent care clinic since your last visit? Hospitalized since your last visit? No    2. Have you seen or consulted any other health care providers outside of the [address] [place] [address] since your last visit? Include any pap smears or colon screening. No    Patient attended triage but did not bring homework form. Patient instructed to email or fax completed homework form to us. Patient informed that not bringing the homework form can result in not being seen next time.

## 2022-07-22 ENCOUNTER — CLINICAL SUPPORT (OUTPATIENT)
Dept: SURGERY | Age: 72
End: 2022-07-22

## 2022-07-22 DIAGNOSIS — E66.09 CLASS 1 OBESITY DUE TO EXCESS CALORIES WITHOUT SERIOUS COMORBIDITY WITH BODY MASS INDEX (BMI) OF 33.0 TO 33.9 IN ADULT: Primary | ICD-10-CM

## 2022-07-22 NOTE — PROGRESS NOTES
[facility]  Metabolic Program Follow-up Nutrition Consult    Date: 2022   Physician: Dorcas Ayoub MD  Name: Allie Onofre  :  1950    Type of Plan: LCD  Weeks on Plan: 6 weeks  Virtual visit was completed through [employer]. ASSESSMENT:    Medications/Supplements:   Prior to Admission medications    Medication Sig Start Date End Date Taking? Authorizing Provider   betamethasone dipropionate (DIPROSONE) 0.05 % topical cream Apply  to affected area as needed. 22   Provider, Historical   hydroquinone (ESOTERICA, MELQUIN) 4 % topical cream APPLY TO DARK AREAS OF SKIN TWICE DAILY 22   Provider, Historical   amLODIPine (NORVASC) 10 mg tablet Take 1 Tablet by mouth daily for 90 days. 22  Jaret Barnett MD   lisinopriL (PRINIVIL, ZESTRIL) 10 mg tablet Take 1 Tablet by mouth daily for 90 days. 22  Jaret Barnett MD              Starting Weight: 149#  Current Weight: 145#  Overall Pounds Lost: 4# Overall Pounds Gained: 0    Exercise/Physical Activity:losing inches, walking on treadmill 3 days a week for 10 minutes. Body shaker 30 minutes. Is patient using [facility] products:yes  If yes, how many per day: 3    Aversions/side effects of product/program:none    Fluids used to mix with products:water    Reported Diet History: On  she fasts from sun up to sun down. Drinks only water. Before sunup will have celery juice or watermelon. When not fasting, only eats chicken and fish, no pork. Tea fresh lemon    Snacks: fresh fruit, cheetos, other refined snack carbs at times.       Beverages: water, tea with lemon, celery juice    24 Hour Diet Recall  Breakfast  ND shake   Lunch  ND shake   Dinner  Banana, greens, chicken   Snacks  ND bar   Beverages  Water     Barriers/concerns preventing patient from achieving goal(s) since last visit:none reported    NUTRITION INTERVENTION:  Pt educated on nutrition recommendations for the [employer] Low Calorie Plan (LCD), with the specific meal pattern of 2 meal replacements every day plus a grocery meal and snack. Daily recommended totals: 1200 calories, 60 grams carbs, 80+ grams protein, and remaining calories as healthy fats. Use LCD handout for meal and snack suggestions and preparation. Grocery meal:  Use the balanced plate method to plan meals, include 3-6 oz of lean source of protein, unlimited non-starchy vegetables, 1/2 cup whole grains/beans OR 1/2 cup fruit OR 1 serving of low fat dairy. Utilize handouts listing healthy snack and meal ideas. Read all nutrition labels. Demonstrated and emphasized identifying serving size, total fat, sugar and protein content. Defined low fat as </= 3 g per serving. Discussed lean and extra lean sources of protein. Avoid foods with sugar listed in the first 3 ingredients and >/10 g sugar per serving. Consume meal replacements every three to four hours or pattern as discussed with provider. Mix with water. May add herbs/spices for taste. Practice mindful eating habits; take small bites, chew thoroughly, avoid distractions, utilize hunger/fullness scale. Reviewed attendance policy of attending weekly nutrition classes. Metabolic support group recommended, and increase physical activity (approved per MD) for long term weight maintenance. NUTRITION MONITORING AND EVALUATION: 4# loss x 6 weeks. Meeting goals, routine meal patterns, using ND shakes, and intaking vegetables. Monitor refined carb snacks. Motivated and continued adherence likely. Followup PRN    Specific tips and techniques to facilitate compliance with above recommendations were provided and discussed. If further details are desired please contact me at [phone]. This phone number was also provided to the patient for any further questions or concerns.           Alberto Hardy, MS, RD, LDN

## 2022-08-02 ENCOUNTER — OFFICE VISIT (OUTPATIENT)
Dept: SURGERY | Age: 72
End: 2022-08-02
Payer: MEDICAID

## 2022-08-02 VITALS
TEMPERATURE: 98.1 F | SYSTOLIC BLOOD PRESSURE: 111 MMHG | WEIGHT: 146.6 LBS | HEART RATE: 65 BPM | HEIGHT: 56 IN | BODY MASS INDEX: 32.98 KG/M2 | OXYGEN SATURATION: 96 % | RESPIRATION RATE: 18 BRPM | DIASTOLIC BLOOD PRESSURE: 71 MMHG

## 2022-08-02 DIAGNOSIS — I10 ESSENTIAL HYPERTENSION: ICD-10-CM

## 2022-08-02 DIAGNOSIS — E66.09 CLASS 1 OBESITY DUE TO EXCESS CALORIES WITHOUT SERIOUS COMORBIDITY WITH BODY MASS INDEX (BMI) OF 33.0 TO 33.9 IN ADULT: Primary | ICD-10-CM

## 2022-08-02 DIAGNOSIS — E78.00 HYPERCHOLESTEROLEMIA: ICD-10-CM

## 2022-08-02 PROCEDURE — 1123F ACP DISCUSS/DSCN MKR DOCD: CPT | Performed by: FAMILY MEDICINE

## 2022-08-02 PROCEDURE — 99213 OFFICE O/P EST LOW 20 MIN: CPT | Performed by: FAMILY MEDICINE

## 2022-08-02 NOTE — PROGRESS NOTES
[facility] Progress Note:   F/up Physician Visit    CC: Weight Management      Karlee Hernadez is a 66 y.o. female who is here for her  f/up physician visit for the LCD Program.    Weight Metrics 8/2/2022 8/2/2022 7/20/2022 7/19/2022 7/5/2022 7/5/2022 6/20/2022   Weight - 146 lb 9.6 oz 145 lb 9.6 oz 147 lb 12.8 oz - 144 lb 8 oz 144 lb 11.2 oz   Neck Circ (inches) 13.5 - - - 13.5 - -   Waist Measure Inches 35 - - - 32.5 - -   Body Fat % 43.9 - - - 43.5 - -   BMI - 32.87 kg/m2 32.64 kg/m2 33.14 kg/m2 - 32.4 kg/m2 32.44 kg/m2         Outpatient Medications Marked as Taking for the 8/2/22 encounter (Office Visit) with Priya Lunsford MD   Medication Sig Dispense Refill    betamethasone dipropionate (DIPROSONE) 0.05 % topical cream Apply  to affected area as needed. hydroquinone (ESOTERICA, MELQUIN) 4 % topical cream APPLY TO DARK AREAS OF SKIN TWICE DAILY      amLODIPine (NORVASC) 10 mg tablet Take 1 Tablet by mouth daily for 90 days. 90 Tablet 0    lisinopriL (PRINIVIL, ZESTRIL) 10 mg tablet Take 1 Tablet by mouth daily for 90 days. 90 Tablet 0         Participation   Did you attend clinic and class last week? no    Review of Systems  Since your last visit, have you experienced any complications? no  If yes, please list:     Are you taking an appetite suppressant? no  If so, is there any Chest Pain, Palpitations or Dizziness? HUNGER CONTROL: good    BP Readings from Last 3 Encounters:   08/02/22 111/71   07/20/22 114/74   07/19/22 121/75       SLEEP:5    Have you received any other medical care this week? no  If yes, where and for what? Have you discontinued or changed any medicine or dose of your medicine since your last visit with Dr Cl Chaney? no  If yes, where and for what? Diet  How many ounces of calorie-free liquids did you consume each day?  30 oz    How many meal replacements did you take each day?  Not consistent    Did you have any problems adhering to the program? no If yes, please explain:      Exercise  Aerobic exercise: 50 min for 4-5 days a week   Resistance exercise:  workouts / week  Any discomfort?  no     If yes, where? Objective  Visit Vitals  /71 (BP 1 Location: Right arm, BP Patient Position: Sitting, BP Cuff Size: Adult)   Pulse 65   Temp 98.1 °F (36.7 °C) (Oral)   Resp 18   Ht 4' 8\" (1.422 m)   Wt 146 lb 9.6 oz (66.5 kg)   SpO2 96%   BMI 32.87 kg/m²     No LMP recorded. Patient is postmenopausal.      Physical Exam  Appearance: well,   Mental:A&O x 3, NAD  H:NC/AT,  EENT:   EOMI, PERRL, No scleral icterus  Neck: no bruit or JVD  Lung: clear, No W/R  ABD: soft, active, nontender  Ext:  no Edema  Neuro: nonfocal  Assessment / Plan    Encounter Diagnoses   Name Primary? Class 1 obesity due to excess calories without serious comorbidity with body mass index (BMI) of 33.0 to 33.9 in adult Yes    Essential hypertension     Hypercholesterolemia      Diagnoses and all orders for this visit:    1. Class 1 obesity due to excess calories without serious comorbidity with body mass index (BMI) of 33.0 to 33.9 in adult  Cont LCD  Exercise daily as much as tolerated  2. Essential hypertension  Bp well controlled on lisinopril 10 mg ea day  Amlodipine 10 mg ea day  3. Hypercholesterolemia  No meds   Cont lifestyle attempts at lowering  1. Weight management control uncertain   Progress was reviewed with patient  Having trouble avoiding sweets  That she is cooking   We made a deal that she will not cook sweets or carbs for 6 weeks    2. Labs    Latest results reviewed with patient       face to face time with Aislinn Metcalf consisted of counseling & coordinating and/or discussing treatment plans in reference to her obesity The primary encounter diagnosis was Class 1 obesity due to excess calories without serious comorbidity with body mass index (BMI) of 33.0 to 33.9 in adult. Diagnoses of Essential hypertension and Hypercholesterolemia were also pertinent to this visit.

## 2022-08-02 NOTE — PROGRESS NOTES
Weight Management. 1 month follow up. 1. Have you been to the ER, urgent care clinic since your last visit? Hospitalized since your last visit? No    2. Have you seen or consulted any other health care providers outside of the [address] [place] [address] since your last visit? Include any pap smears or colon screening. No    BMI  - 32.5    Patient attended triage but did not bring homework form. Patient instructed to email or fax completed homework form to us. Patient informed that not bringing the homework form can result in not being seen next time.

## 2022-08-09 ENCOUNTER — OFFICE VISIT (OUTPATIENT)
Dept: SURGERY | Age: 72
End: 2022-08-09

## 2022-08-09 DIAGNOSIS — E66.09 CLASS 1 OBESITY DUE TO EXCESS CALORIES WITHOUT SERIOUS COMORBIDITY WITH BODY MASS INDEX (BMI) OF 33.0 TO 33.9 IN ADULT: Primary | ICD-10-CM

## 2022-08-09 NOTE — PROGRESS NOTES
[facility]  Metabolic Weight Loss Program        Patient's Name: Saad Styles  : 1950    This patient is enrolled in [address] [place] [address] Weight Loss Program and attended the required weekly virtual nutrition class hosted via [employer] today.       Jean Claude Feliciano, MS, RD, LDN

## 2022-08-15 ENCOUNTER — CLINICAL SUPPORT (OUTPATIENT)
Dept: SURGERY | Age: 72
End: 2022-08-15

## 2022-08-15 VITALS
DIASTOLIC BLOOD PRESSURE: 77 MMHG | WEIGHT: 147.7 LBS | RESPIRATION RATE: 18 BRPM | BODY MASS INDEX: 33.23 KG/M2 | OXYGEN SATURATION: 97 % | SYSTOLIC BLOOD PRESSURE: 117 MMHG | HEIGHT: 56 IN | TEMPERATURE: 98.4 F | HEART RATE: 80 BPM

## 2022-08-15 DIAGNOSIS — I10 ESSENTIAL HYPERTENSION: ICD-10-CM

## 2022-08-15 DIAGNOSIS — E78.00 HYPERCHOLESTEROLEMIA: ICD-10-CM

## 2022-08-15 DIAGNOSIS — E66.09 CLASS 1 OBESITY DUE TO EXCESS CALORIES WITHOUT SERIOUS COMORBIDITY WITH BODY MASS INDEX (BMI) OF 33.0 TO 33.9 IN ADULT: Primary | ICD-10-CM

## 2022-08-15 RX ORDER — TRETINOIN 0.25 MG/G
CREAM TOPICAL
COMMUNITY
Start: 2022-05-26 | End: 2022-09-07 | Stop reason: ALTCHOICE

## 2022-08-15 NOTE — PROGRESS NOTES
8/2/2022    Progress Note: Weekly Education Class in the [facility] Weight Loss Program         Patient is on Very Low Calorie Diet [] (4 meal replacements per day, 800 kcal/day)      Low Calorie Diet [x] (2-3 meal replacements per day, 6908-6523 kcal/day)    1) Did patient have any new symptoms or physical problems? Yes []    No [x]    If yes, check & comment: weakness [], fatigue [], lightheadedness [], headache [], cramps [], cold intolerance [], hair loss [], diarrhea [], constipation [],  NA [] other:                                 2) Has patient had any medical attention from other providers, urgent care or the emergency room this week? Yes []  No [x]       NA [], If yes, why:                                       3) Any other sugar sweetened beverages consumed this week? Yes []  No [x]    4) Did patient have any problems adhering to the diet? Yes []  No [x] NA []    If yes, Vacation [], Celebrations [], Conferences [], Family Reunions [] other:                                                5) How many hours of sleep this week? 4-5    (range)  NA []    Number of meal replacements consumed daily? 0  (range)  NA []    Average ounces of water patient consumed daily this week (not including shakes)? 73     (divide the weekly total by 7)    Did you eat any food outside of the program? Yes [] No [x]    Physical Activity Over the Past Week:    Cardio exercise: 80 min  Strength exercise: 5 workouts / week  Number of steps walked per day: 0    How has patient mood overall been this week? Sad [], Happy [], Stressed [], Tired Good           Medications reconciled by nurse Yes [x]  No[]    Patient was given therapeutic recommendations for any noted side effects of their dietary approach based upon [facility] patient manual per providers recommendation.      8/8/2022    Progress Note: Weekly Education Class in the [facility] Weight Loss Program         Patient is on Very Low Calorie Diet [] (4 meal replacements per day, 800 kcal/day)      Low Calorie Diet [x] (2-3 meal replacements per day, 1707-3881 kcal/day)    1) Did patient have any new symptoms or physical problems? Yes []    No [x]    If yes, check & comment: weakness [], fatigue [], lightheadedness [], headache [], cramps [], cold intolerance [], hair loss [], diarrhea [], constipation [],  NA [] other:                                 2) Has patient had any medical attention from other providers, urgent care or the emergency room this week? Yes []  No [x]       NA [], If yes, why:                                       3) Any other sugar sweetened beverages consumed this week? Yes []  No [x]    4) Did patient have any problems adhering to the diet? Yes []  No [x] NA []    If yes, Vacation [], Celebrations [], Conferences [], Family Reunions [] other:                                                5) How many hours of sleep this week? 4-4.5    (range)  NA []    Number of meal replacements consumed daily? 0  (range)  NA []    Average ounces of water patient consumed daily this week (not including shakes)? 72     (divide the weekly total by 7)    Did you eat any food outside of the program? Yes [] No [x]    Physical Activity Over the Past Week:    Cardio exercise: 35 min  Strength exercise: 4 workouts / week  Number of steps walked per day: 0    How has patient mood overall been this week? Sad [], Happy [], Stressed [], Tired [], Content [], NA [], other Good           Medications reconciled by nurse Yes [x]  No[]    Patient was given therapeutic recommendations for any noted side effects of their dietary approach based upon [facility] patient manual per providers recommendation.

## 2022-08-15 NOTE — PROGRESS NOTES
Weight Management. 1. Have you been to the ER, urgent care clinic since your last visit? Hospitalized since your last visit? No    2. Have you seen or consulted any other health care providers outside of the [address] [place] [address] since your last visit? Include any pap smears or colon screening.  No

## 2022-08-17 ENCOUNTER — OFFICE VISIT (OUTPATIENT)
Dept: SLEEP MEDICINE | Age: 72
End: 2022-08-17

## 2022-08-17 DIAGNOSIS — I10 ESSENTIAL HYPERTENSION: ICD-10-CM

## 2022-08-17 DIAGNOSIS — G47.33 OBSTRUCTIVE SLEEP APNEA (ADULT) (PEDIATRIC): Primary | ICD-10-CM

## 2022-08-17 NOTE — PROGRESS NOTES
S>Emmanuelle Ovalle is a 66 y.o. female seen today to receive a home sleep testing unit (HST). Patient was educated on proper hookup and operation of the HST via detailed instruction sheet (per COVID-19 precautions)  Instruction forms with after hours contact and documentation were signed. O>    There were no vitals taken for this visit. A>  No diagnosis found. P>  General information regarding operations and maintenance of the device was provided. Follow-up appointment was made to return the HST. She will be contacted once the results have been reviewed. She was asked to contact our office for any problems regarding her home sleep test study.

## 2022-08-19 ENCOUNTER — CLINICAL SUPPORT (OUTPATIENT)
Dept: SURGERY | Age: 72
End: 2022-08-19

## 2022-08-19 DIAGNOSIS — E66.09 CLASS 1 OBESITY DUE TO EXCESS CALORIES WITHOUT SERIOUS COMORBIDITY WITH BODY MASS INDEX (BMI) OF 33.0 TO 33.9 IN ADULT: Primary | ICD-10-CM

## 2022-08-19 NOTE — PROGRESS NOTES
[facility]  Metabolic Program Follow-up Nutrition Consult    Date: 2022   Physician: Nathaly Kinsey MD  Name: [facility]  :  1950    Type of Plan: LCD  Weeks on Plan: 10 weeks  Virtual visit was completed through [address] [place] [address]. ASSESSMENT:    Medications/Supplements:   Prior to Admission medications    Medication Sig Start Date End Date Taking? Authorizing Provider   tretinoin (RETIN-A) 0.025 % topical cream mix with cerave lotion and apply to the affected area(s) of the face as directed 22   Provider, Historical   betamethasone dipropionate (DIPROSONE) 0.05 % topical cream Apply  to affected area as needed. 22   Provider, Historical   hydroquinone (ESOTERICA, MELQUIN) 4 % topical cream APPLY TO DARK AREAS OF SKIN TWICE DAILY 22   Provider, Historical              Starting Weight: 149#   Current Weight: 147# (145# last visit one month ago)  Overall Pounds Lost: 2#  Overall Pounds Gained: 0    Exercise/Physical Activity: bike stationary 3 x weeks for 10-15 minutes. Is patient using New Directions products:yes and no  If yes, how many per day:0-1, depends on the day    Aversions/side effects of product/program:none    Fluids used to mix with products:water    Reported Diet History:may or may not have ND shakes. Makes own shakes, or bakes own food. Only eating one meal per day when not having the ND shakes. Other meals: salmon, greens. Chicken breast and vegetables. Snacks: no structured snacks, but will nibble on grandson's meals, such as fries. Beverages: 60-80 ounces water per day      24 Hour Diet Recall  Breakfast  Skipped    Lunch  skipped   Dinner  Cheeseburger with bun, spinach   Snacks  cheetos   Beverages  Ginger tea, coffee, water     Barriers/concerns preventing patient from achieving goal(s) since last visit:skipping meals, not hungry.     NUTRITION INTERVENTION:  Pt educated on nutrition recommendations for the New Direction Low Calorie Plan (LCD), with the specific meal pattern of 2 meal replacements every day plus a grocery meal and snack. Daily recommended totals: 1200 calories, 60 grams carbs, 80+ grams protein, and remaining calories as healthy fats. Use LCD handout for meal and snack suggestions and preparation. Grocery meal:  Use the balanced plate method to plan meals, include 3-6 oz of lean source of protein, unlimited non-starchy vegetables, 1/2 cup whole grains/beans OR 1/2 cup fruit OR 1 serving of low fat dairy. Utilize handouts listing healthy snack and meal ideas. Read all nutrition labels. Demonstrated and emphasized identifying serving size, total fat, sugar and protein content. Defined low fat as </= 3 g per serving. Discussed lean and extra lean sources of protein. Avoid foods with sugar listed in the first 3 ingredients and >/10 g sugar per serving. Consume meal replacements every three to four hours or pattern as discussed with provider. Mix with water. May add herbs/spices for taste. Practice mindful eating habits; take small bites, chew thoroughly, avoid distractions, utilize hunger/fullness scale. Reviewed attendance policy of attending weekly nutrition classes. Metabolic support group recommended, and increase physical activity (approved per MD) for long term weight maintenance. NUTRITION MONITORING AND EVALUATION: 2# gain since last visit one month ago, with an overall loss of 2# since program start approx. 2 months ago. Admits to not doing program properly. Mostly grazing on high refined carbs throughout the day, then one big meal in the evenings. No aversions to ND shakes, just doesn't think to do them at times. Encouraged her to reduce skipping, increase protein (through the shake and/or more structured snack - list provided), to assist with cravings, and promote weight loss.   Even if not too hungry, first time eating for the day should not be late afternoon after having been up since early morning. Pt appears to be motivated, however adherence is questionable. No followup was scheduled today. The following goals were established with patient;  1) ND shake 1/2 packet instead of whole packet if not very hungry early in day. This will at least provide 100 calories and 12.5 grams protein with some vitamins and minerals. 2) increase riding stationary bike to 4-5 days a week for 10+ minutes at a time. 3) followup PRN      Specific tips and techniques to facilitate compliance with above recommendations were provided and discussed. If further details are desired please contact me at [phone]. This phone number was also provided to the patient for any further questions or concerns.           Yasmine Saba, MS, RD, LDN

## 2022-08-22 ENCOUNTER — TELEPHONE (OUTPATIENT)
Dept: SLEEP MEDICINE | Age: 72
End: 2022-08-22

## 2022-08-22 DIAGNOSIS — G47.33 OBSTRUCTIVE SLEEP APNEA (ADULT) (PEDIATRIC): Primary | ICD-10-CM

## 2022-08-23 DIAGNOSIS — I10 ESSENTIAL HYPERTENSION: ICD-10-CM

## 2022-08-23 RX ORDER — LISINOPRIL 10 MG/1
10 TABLET ORAL DAILY
Qty: 90 TABLET | Refills: 0 | Status: SHIPPED | OUTPATIENT
Start: 2022-08-23 | End: 2022-11-21

## 2022-08-23 RX ORDER — AMLODIPINE BESYLATE 10 MG/1
10 TABLET ORAL DAILY
Qty: 90 TABLET | Refills: 0 | Status: SHIPPED | OUTPATIENT
Start: 2022-08-23 | End: 2022-11-21

## 2022-08-24 ENCOUNTER — DOCUMENTATION ONLY (OUTPATIENT)
Dept: SLEEP MEDICINE | Age: 72
End: 2022-08-24

## 2022-08-29 ENCOUNTER — CLINICAL SUPPORT (OUTPATIENT)
Dept: SURGERY | Age: 72
End: 2022-08-29

## 2022-08-29 VITALS
HEART RATE: 74 BPM | BODY MASS INDEX: 33.54 KG/M2 | RESPIRATION RATE: 18 BRPM | HEIGHT: 56 IN | WEIGHT: 149.1 LBS | OXYGEN SATURATION: 98 % | DIASTOLIC BLOOD PRESSURE: 80 MMHG | TEMPERATURE: 98.2 F | SYSTOLIC BLOOD PRESSURE: 126 MMHG

## 2022-08-29 DIAGNOSIS — E66.09 CLASS 1 OBESITY DUE TO EXCESS CALORIES WITHOUT SERIOUS COMORBIDITY WITH BODY MASS INDEX (BMI) OF 33.0 TO 33.9 IN ADULT: Primary | ICD-10-CM

## 2022-08-29 DIAGNOSIS — E78.00 HYPERCHOLESTEROLEMIA: ICD-10-CM

## 2022-08-29 DIAGNOSIS — I10 ESSENTIAL HYPERTENSION: ICD-10-CM

## 2022-09-01 NOTE — PROGRESS NOTES
Nurse note from patient's weekly  LCD / Maintenance class was reviewed. Pertinent medical concerns were:   reviewed     BP Readings from Last 3 Encounters:   08/29/22 126/80   08/15/22 117/77   08/02/22 111/71       Weight Metrics 8/29/2022 8/15/2022 8/2/2022 8/2/2022 7/20/2022 7/19/2022 7/5/2022   Weight 149 lb 1.6 oz 147 lb 11.2 oz - 146 lb 9.6 oz 145 lb 9.6 oz 147 lb 12.8 oz -   Neck Circ (inches) - - 13.5 - - - 13.5   Waist Measure Inches - - 35 - - - 32.5   Body Fat % - - 43.9 - - - 43.5   BMI 33.43 kg/m2 33.11 kg/m2 - 32.87 kg/m2 32.64 kg/m2 33.14 kg/m2 -       Current Outpatient Medications   Medication Sig Dispense Refill    tretinoin (RETIN-A) 0.025 % topical cream mix with cerave lotion and apply to the affected area(s) of the face as directed      betamethasone dipropionate (DIPROSONE) 0.05 % topical cream Apply  to affected area as needed. hydroquinone (ESOTERICA, MELQUIN) 4 % topical cream APPLY TO DARK AREAS OF SKIN TWICE DAILY      lisinopriL (PRINIVIL, ZESTRIL) 10 mg tablet Take 1 Tablet by mouth daily for 90 days. 90 Tablet 0    amLODIPine (NORVASC) 10 mg tablet Take 1 Tablet by mouth daily for 90 days.  90 Tablet 0

## 2022-09-02 NOTE — PROGRESS NOTES
1. Have you been to the ER, urgent care clinic since your last visit? Hospitalized since your last visit? No    2. Have you seen or consulted any other health care providers outside of the [address] [place] [address] since your last visit? Include any pap smears or colon screening.  No
Nurse note from patient's weekly  LCD /  class was reviewed. Pertinent medical concerns were:   reviewed     BP Readings from Last 3 Encounters:   08/29/22 126/80   08/15/22 117/77   08/02/22 111/71       Weight Metrics 8/29/2022 8/15/2022 8/2/2022 8/2/2022 7/20/2022 7/19/2022 7/5/2022   Weight 149 lb 1.6 oz 147 lb 11.2 oz - 146 lb 9.6 oz 145 lb 9.6 oz 147 lb 12.8 oz -   Neck Circ (inches) - - 13.5 - - - 13.5   Waist Measure Inches - - 35 - - - 32.5   Body Fat % - - 43.9 - - - 43.5   BMI 33.43 kg/m2 33.11 kg/m2 - 32.87 kg/m2 32.64 kg/m2 33.14 kg/m2 -       Current Outpatient Medications   Medication Sig Dispense Refill    lisinopriL (PRINIVIL, ZESTRIL) 10 mg tablet Take 1 Tablet by mouth daily for 90 days. 90 Tablet 0    amLODIPine (NORVASC) 10 mg tablet Take 1 Tablet by mouth daily for 90 days. 90 Tablet 0    tretinoin (RETIN-A) 0.025 % topical cream mix with cerave lotion and apply to the affected area(s) of the face as directed      betamethasone dipropionate (DIPROSONE) 0.05 % topical cream Apply  to affected area as needed.       hydroquinone (ESOTERICA, MELQUIN) 4 % topical cream APPLY TO DARK AREAS OF SKIN TWICE DAILY
Week 8/15  Progress Note: Weekly Education Class in the [facility]         Patient is on Very Low Calorie Diet [] (4 meal replacements per day, 800 kcal/day)      Low Calorie Diet [x] (2-3 meal replacements per day, 1751-9249 kcal/day)    1) Did patient have any new symptoms or physical problems? Yes []    No [x]    If yes, check & comment: weakness [], fatigue [], lightheadedness [], headache [], cramps [], cold intolerance [], hair loss [], diarrhea [], constipation [],  NA [] other:                                 2) Has patient had any medical attention from other providers, urgent care or the emergency room this week? Yes []  No [x]       NA [], If yes, why:                                       3) Any other sugar sweetened beverages consumed this week? Yes []  No [x]    4) Did patient have any problems adhering to the diet? Yes []  No [x] NA []    If yes, Vacation [], Celebrations [], Conferences [], Family Reunions [] other:                                                5) How many hours of sleep this week? 33hrs    (range)  NA []    Number of meal replacements consumed daily? 3-5 (range) total of 29 bottle for the week NA []    Average ounces of water patient consumed daily this week (not including shakes)? Not documented     (divide the weekly total by 7)    Did you eat any food outside of the program? Yes [] No [] Not documented    Physical Activity Over the Past Week: None reports had some pain in left leg    Cardio exercise: 0 min  Strength exercise: 0 workouts / week  Number of steps walked per day: 0     How has patient mood overall been this week? Sad [], Happy [], Stressed [], Tired [], Content [], NA [], other Not documented           Medications reconciled by nurse Yes [x]  No[]    Patient was given therapeutic recommendations for any noted side effects of their dietary approach based upon [facility] patient manual per providers recommendation.
Week 8/21  Progress Note: Weekly Education Class in the [facility]         Patient is on Very Low Calorie Diet [] (4 meal replacements per day, 800 kcal/day)      Low Calorie Diet [x] (2-3 meal replacements per day, 2097-4642 kcal/day)    1) Did patient have any new symptoms or physical problems? Yes []    No [x]    If yes, check & comment: weakness [], fatigue [], lightheadedness [], headache [], cramps [], cold intolerance [], hair loss [], diarrhea [], constipation [],  NA [] other:                                 2) Has patient had any medical attention from other providers, urgent care or the emergency room this week? Yes []  No [x]       NA [], If yes, why:                                       3) Any other sugar sweetened beverages consumed this week? Yes [x]  No []    4) Did patient have any problems adhering to the diet? Yes []  No [x] NA []    If yes, Vacation [], Celebrations [], Conferences [], Family Reunions [] other:                                                5) How many hours of sleep this week? 34hrs    (range)  NA []    Number of meal replacements consumed daily? 3-5 (range)  NA [] Total of 27 bottles for the week    Average ounces of water patient consumed daily this week (not including shakes)? Not documented     (divide the weekly total by 7)    Did you eat any food outside of the program? Yes [] No [] Not documented    Physical Activity Over the Past Week:    Cardio exercise: 50 min  Strength exercise: 1 workouts / week  Number of steps walked per day: Not documented    How has patient mood overall been this week? Sad [], Happy [], Stressed [], Tired [], Content [], NA [], other Not documented           Medications reconciled by nurse Yes [x]  No[]    Patient was given therapeutic recommendations for any noted side effects of their dietary approach based upon [facility] patient manual per providers recommendation.
no

## 2022-09-07 ENCOUNTER — OFFICE VISIT (OUTPATIENT)
Dept: PRIMARY CARE CLINIC | Age: 72
End: 2022-09-07
Payer: MEDICAID

## 2022-09-07 VITALS
TEMPERATURE: 97.5 F | BODY MASS INDEX: 33.29 KG/M2 | DIASTOLIC BLOOD PRESSURE: 76 MMHG | RESPIRATION RATE: 18 BRPM | OXYGEN SATURATION: 98 % | HEIGHT: 56 IN | SYSTOLIC BLOOD PRESSURE: 121 MMHG | HEART RATE: 73 BPM | WEIGHT: 148 LBS

## 2022-09-07 DIAGNOSIS — M85.89 OSTEOPENIA OF MULTIPLE SITES: ICD-10-CM

## 2022-09-07 DIAGNOSIS — R20.2 TINGLING SENSATION: ICD-10-CM

## 2022-09-07 DIAGNOSIS — I10 PRIMARY HYPERTENSION: Primary | ICD-10-CM

## 2022-09-07 DIAGNOSIS — E78.2 MIXED HYPERLIPIDEMIA: ICD-10-CM

## 2022-09-07 PROCEDURE — 99213 OFFICE O/P EST LOW 20 MIN: CPT | Performed by: INTERNAL MEDICINE

## 2022-09-07 PROCEDURE — 1123F ACP DISCUSS/DSCN MKR DOCD: CPT | Performed by: INTERNAL MEDICINE

## 2022-09-07 NOTE — PROGRESS NOTES
1. \"Have you been to the ER, urgent care clinic since your last visit? Hospitalized since your last visit? \" No    2. \"Have you seen or consulted any other health care providers outside of the [address] [place] [address] since your last visit? \" No     3. For patients aged 39-70: Has the patient had a colonoscopy / FIT/ Cologuard? Yes - no Care Gap present      If the patient is female:    4. For patients aged 41-77: Has the patient had a mammogram within the past 2 years? Yes - no Care Gap present      5. For patients aged 21-65: Has the patient had a pap smear?  NA - based on age or sex    Chief Complaint   Patient presents with    Physical    Tingling     In left leg for the past few weeks, on and off

## 2022-09-07 NOTE — PROGRESS NOTES
Joey Iglesias (: 1950) is a 66 y.o. female, established patient, here for evaluation of the following chief complaint(s):  Physical and Tingling (In left leg for the past few weeks, on and off)     Written by Maycol Howard, as dictated by Dr. Maria Esther Florian MD.     ASSESSMENT/PLAN:  Below is the assessment and plan developed based on review of pertinent history, physical exam, labs, studies, and medications. 1. Primary hypertension  BP is well-controlled on current medication. No change to dosage at this time. 2. Tingling sensation  Explained that it be due to nerve pressing which will improve with exercise and continue taking VIT B12 .     3. Osteopenia of multiple sites  Advised her to start weight baring exercises. 4. Mixed hyperlipidemia  Advised her to use treadmill at home 4-5x weekly if she does not want to join a gym or go on walks. Continue to watch her diet. SUBJECTIVE/OBJECTIVE:  Tingling  Pertinent negatives include no abdominal pain, no headaches and no shortness of breath. The patient comes in today for a follow up. She was previously followed by Dr. Kiko Vega at weight loss clinic. Her last lipid panel showed hypercholesterolemia in . Followed by Dr. Leonardo Rubio (Internal Med). She does not eat many carbs and watches her diet. She does not walk daily for exercise. She has workout equipment at home. She was followed by Sleep Medicine and she is unaware of results. Her BP is good today. She is on lisinopril 10 mg daily and amlodipine 10 mg daily for HTN. She is c/o intermittent tingling in L leg in short intervals. Denies numbness. She is unsure of triggers.    Patient Active Problem List   Diagnosis Code    Hallux rigidus of left foot M20.22    Hallux valgus (acquired), right foot M20.11    Eczema L30.9    Essential hypertension I10    Arthritis M19.90    Osteopenia of multiple sites M85.89        Current Outpatient Medications on File Prior to Visit   Medication Sig Dispense Refill    lisinopriL (PRINIVIL, ZESTRIL) 10 mg tablet Take 1 Tablet by mouth daily for 90 days. 90 Tablet 0    amLODIPine (NORVASC) 10 mg tablet Take 1 Tablet by mouth daily for 90 days. 90 Tablet 0    [DISCONTINUED] tretinoin (RETIN-A) 0.025 % topical cream mix with cerave lotion and apply to the affected area(s) of the face as directed (Patient not taking: Reported on 2022)      [DISCONTINUED] betamethasone dipropionate (DIPROSONE) 0.05 % topical cream Apply  to affected area as needed. (Patient not taking: Reported on 2022)      [DISCONTINUED] hydroquinone (ESOTERICA, MELQUIN) 4 % topical cream APPLY TO DARK AREAS OF SKIN TWICE DAILY (Patient not taking: Reported on 2022)       No current facility-administered medications on file prior to visit.        Allergies   Allergen Reactions    Aspirin Other (comments)     \"TIGHTNESS OF CHEST\"       Past Medical History:   Diagnosis Date    Arthritis     GERD (gastroesophageal reflux disease)     Hemorrhoids 2020    Hypertension     Menopause     LMP-       Past Surgical History:   Procedure Laterality Date    COLONOSCOPY N/A 2020    COLONOSCOPY performed by Mary Walters MD at [facility] OR    HX HEMORRHOIDECTOMY      HX OTHER SURGICAL Bilateral     bunionectomy    HX OTHER SURGICAL  2021    EUS,  Rigid sigmoidoscopy, Hemorrhoidectomy-Dr. Carlos Delaney    AGAPITO STEREO  BX BREAST LT 1ST LESION W/CLIP AND SPECIMEN Left long ago    Benign    MD ABDOMEN SURGERY PROC UNLISTED      gallbladder       Family History   Problem Relation Age of Onset    Hypertension Mother     Heart Disease Father          OF HEART ATTACK IN EARLY 60'S    Heart Disease Sister     Hypertension Sister     Hypertension Brother     Alcohol abuse Sister     Alcohol abuse Sister     Gout Sister     Stroke Sister     Heart Disease Sister     Other Sister         ADDICTION TO NARCOTICS    No Known Problems Sister     Stroke Sister Heart Disease Sister     Hypertension Sister     No Known Problems Sister     Cancer Sister         OVARIAN    Anesth Problems Neg Hx        Social History     Socioeconomic History    Marital status:      Spouse name: Not on file    Number of children: Not on file    Years of education: Not on file    Highest education level: Not on file   Occupational History    Not on file   Tobacco Use    Smoking status: Never    Smokeless tobacco: Never    Tobacco comments:     \"TRIED A FEW TIMES\"   Vaping Use    Vaping Use: Never used   Substance and Sexual Activity    Alcohol use: No    Drug use: No    Sexual activity: Not Currently   Other Topics Concern    Not on file   Social History Narrative    Not on file     No visits with results within 3 Month(s) from this visit. Latest known visit with results is:   Office Visit on 06/07/2022   Component Date Value Ref Range Status    Glucose 06/09/2022 95  65 - 99 mg/dL Final    BUN 06/09/2022 23  8 - 27 mg/dL Final    Creatinine 06/09/2022 1.03 (A) 0.57 - 1.00 mg/dL Final    eGFR 06/09/2022 57 (A) >59 mL/min/1.73 Final    BUN/Creatinine ratio 06/09/2022 22  12 - 28 Final    Sodium 06/09/2022 140  134 - 144 mmol/L Final    Potassium 06/09/2022 4.7  3.5 - 5.2 mmol/L Final    Chloride 06/09/2022 102  96 - 106 mmol/L Final    CO2 06/09/2022 22  20 - 29 mmol/L Final    Calcium 06/09/2022 10.0  8.7 - 10.3 mg/dL Final    Protein, total 06/09/2022 8.0  6.0 - 8.5 g/dL Final    Albumin 06/09/2022 4.8 (A) 3.7 - 4.7 g/dL Final    GLOBULIN, TOTAL 06/09/2022 3.2  1.5 - 4.5 g/dL Final    A-G Ratio 06/09/2022 1.5  1.2 - 2.2 Final    Bilirubin, total 06/09/2022 1.0  0.0 - 1.2 mg/dL Final    Alk.  phosphatase 06/09/2022 74  44 - 121 IU/L Final    AST (SGOT) 06/09/2022 17  0 - 40 IU/L Final    ALT (SGPT) 06/09/2022 18  0 - 32 IU/L Final    Cholesterol, total 06/09/2022 227 (A) 100 - 199 mg/dL Final    Triglyceride 06/09/2022 114  0 - 149 mg/dL Final    HDL Cholesterol 06/09/2022 59  >39 mg/dL Final    VLDL, calculated 06/09/2022 20  5 - 40 mg/dL Final    LDL, calculated 06/09/2022 148 (A) 0 - 99 mg/dL Final    Hemoglobin A1c 06/09/2022 4.8  4.8 - 5.6 % Final    Comment:          Prediabetes: 5.7 - 6.4           Diabetes: >6.4           Glycemic control for adults with diabetes: <7.0      Estimated average glucose 06/09/2022 91  mg/dL Final      Review of Systems   Constitutional:  Negative for activity change, fatigue and unexpected weight change. HENT:  Negative for congestion, hearing loss, rhinorrhea and sore throat. Eyes:  Negative for discharge. Respiratory:  Negative for cough, chest tightness and shortness of breath. Cardiovascular:  Negative for leg swelling. Gastrointestinal:  Negative for abdominal pain, constipation and diarrhea. Genitourinary:  Negative for dysuria, flank pain, frequency and urgency. Musculoskeletal:  Negative for arthralgias, back pain and myalgias. Skin:  Negative for color change and rash. Neurological:  Positive for tingling. Negative for dizziness, light-headedness and headaches. Tingling L leg   Psychiatric/Behavioral:  Negative for dysphoric mood and sleep disturbance. The patient is not nervous/anxious. Visit Vitals  /76 (BP 1 Location: Left upper arm, BP Patient Position: Sitting)   Pulse 73   Temp 97.5 °F (36.4 °C) (Temporal)   Resp 18   Ht 4' 8\" (1.422 m)   Wt 148 lb (67.1 kg)   SpO2 98%   BMI 33.18 kg/m²      Physical Exam  Vitals and nursing note reviewed. Constitutional:       General: She is not in acute distress. Appearance: Normal appearance. She is well-developed. She is not diaphoretic. HENT:      Right Ear: External ear normal.      Left Ear: External ear normal.   Eyes:      General: No scleral icterus. Right eye: No discharge. Left eye: No discharge. Extraocular Movements: Extraocular movements intact.       Conjunctiva/sclera: Conjunctivae normal.   Cardiovascular:      Rate and Rhythm: Normal rate and regular rhythm. Pulmonary:      Effort: Pulmonary effort is normal.      Breath sounds: Normal breath sounds. No wheezing. Abdominal:      General: Bowel sounds are normal.      Palpations: Abdomen is soft. Tenderness: There is no abdominal tenderness. Musculoskeletal:      Cervical back: Normal range of motion and neck supple. Lymphadenopathy:      Cervical: No cervical adenopathy. Neurological:      Mental Status: She is alert and oriented to person, place, and time. Psychiatric:         Mood and Affect: Mood and affect normal.       An electronic signature was used to authenticate this note.   -- Macy Eisenmenger

## 2022-09-12 ENCOUNTER — HOSPITAL ENCOUNTER (OUTPATIENT)
Dept: SLEEP MEDICINE | Age: 72
Discharge: HOME | End: 2022-09-12
Payer: MEDICAID

## 2022-09-12 PROCEDURE — 95806 SLEEP STUDY UNATT&RESP EFFT: CPT | Performed by: INTERNAL MEDICINE

## 2022-09-13 ENCOUNTER — TELEPHONE (OUTPATIENT)
Dept: SLEEP MEDICINE | Age: 72
End: 2022-09-13

## 2022-09-13 DIAGNOSIS — G47.33 OBSTRUCTIVE SLEEP APNEA (ADULT) (PEDIATRIC): Primary | ICD-10-CM

## 2022-09-16 NOTE — TELEPHONE ENCOUNTER
Results of sleep study in [facility]  Lead tech to convey results to patient  HSAT results in [facility] [employer]. Data quality was good. Some snoring. Test positive for significant sleep apnea. AHI 6/hour. They were associated with oxygen desaturations  and lowest oxygen saturation was 79%. We had discussed treatment options at initial consultation. Based on the results of the home sleep apnea test, I believe a trial of APAP would be an effective mode of therapy. APAP order attached. she should be seen in the sleep disorder center 4-6 weeks after initiating PAP therapy. The APAP will have modem access so she can call the sleep center if she  has questions/concerns regarding the initial PAP settings. Front staff to Order PAP and call patient and let them know which DME company they should be hearing from after results reviewed with lead support technologist.   Schedule for first adherence visit in 6 weeks.

## 2022-09-19 ENCOUNTER — DOCUMENTATION ONLY (OUTPATIENT)
Dept: SLEEP MEDICINE | Age: 72
End: 2022-09-19

## 2022-11-23 DIAGNOSIS — I10 ESSENTIAL HYPERTENSION: ICD-10-CM

## 2022-11-23 RX ORDER — LISINOPRIL AND HYDROCHLOROTHIAZIDE 20; 25 MG/1; MG/1
TABLET ORAL
Status: CANCELLED | OUTPATIENT
Start: 2022-11-23

## 2022-11-23 RX ORDER — AMLODIPINE BESYLATE 10 MG/1
10 TABLET ORAL DAILY
Qty: 90 TABLET | Refills: 0 | Status: SHIPPED | OUTPATIENT
Start: 2022-11-23 | End: 2023-02-21

## 2022-11-23 NOTE — TELEPHONE ENCOUNTER
Patient called in refills. PCP: Vidal Cid MD    Last appt: 9/7/2022  No future appointments. Requested Prescriptions     Pending Prescriptions Disp Refills    amLODIPine (NORVASC) 10 mg tablet 90 Tablet 0     Sig: Take 1 Tablet by mouth daily for 90 days.     lisinopril-hydroCHLOROthiazide (PRINZIDE, ZESTORETIC) 20-25 mg per tablet         Prior labs and Blood pressures:  BP Readings from Last 3 Encounters:   09/07/22 121/76   08/29/22 126/80   08/15/22 117/77     Lab Results   Component Value Date/Time    Sodium 140 06/09/2022 08:54 AM    Potassium 4.7 06/09/2022 08:54 AM    Chloride 102 06/09/2022 08:54 AM    CO2 22 06/09/2022 08:54 AM    Anion gap 8 04/15/2021 09:44 AM    Glucose 95 06/09/2022 08:54 AM    BUN 23 06/09/2022 08:54 AM    Creatinine 1.03 (H) 06/09/2022 08:54 AM    BUN/Creatinine ratio 22 06/09/2022 08:54 AM    GFR est AA 55 (L) 11/12/2021 09:23 AM    GFR est non-AA 47 (L) 11/12/2021 09:23 AM    Calcium 10.0 06/09/2022 08:54 AM

## 2022-11-30 DIAGNOSIS — I10 ESSENTIAL HYPERTENSION: ICD-10-CM

## 2022-11-30 RX ORDER — LISINOPRIL 10 MG/1
TABLET ORAL
Qty: 90 TABLET | Refills: 0 | Status: SHIPPED | OUTPATIENT
Start: 2022-11-30

## 2023-01-16 ENCOUNTER — OFFICE VISIT (OUTPATIENT)
Dept: PRIMARY CARE CLINIC | Age: 73
End: 2023-01-16
Payer: MEDICARE

## 2023-01-16 VITALS
TEMPERATURE: 97.1 F | WEIGHT: 151.6 LBS | HEART RATE: 75 BPM | HEIGHT: 56 IN | BODY MASS INDEX: 34.1 KG/M2 | RESPIRATION RATE: 18 BRPM | OXYGEN SATURATION: 98 % | DIASTOLIC BLOOD PRESSURE: 70 MMHG | SYSTOLIC BLOOD PRESSURE: 127 MMHG

## 2023-01-16 DIAGNOSIS — G47.33 OSA ON CPAP: ICD-10-CM

## 2023-01-16 DIAGNOSIS — M85.89 OSTEOPENIA OF MULTIPLE SITES: ICD-10-CM

## 2023-01-16 DIAGNOSIS — Z00.00 MEDICARE ANNUAL WELLNESS VISIT, SUBSEQUENT: Primary | ICD-10-CM

## 2023-01-16 DIAGNOSIS — I10 ESSENTIAL HYPERTENSION: ICD-10-CM

## 2023-01-16 DIAGNOSIS — L81.9 HYPERPIGMENTATION: ICD-10-CM

## 2023-01-16 DIAGNOSIS — Z71.89 ACP (ADVANCE CARE PLANNING): ICD-10-CM

## 2023-01-16 DIAGNOSIS — E78.2 MIXED HYPERLIPIDEMIA: ICD-10-CM

## 2023-01-16 DIAGNOSIS — Z99.89 OSA ON CPAP: ICD-10-CM

## 2023-01-16 DIAGNOSIS — M19.90 ARTHRITIS: ICD-10-CM

## 2023-01-16 NOTE — PROGRESS NOTES
1. \"Have you been to the ER, urgent care clinic since your last visit? Hospitalized since your last visit? \" No    2. \"Have you seen or consulted any other health care providers outside of the [address] [place] [address] since your last visit? \" [place]     3. For patients aged 39-70: Has the patient had a colonoscopy / FIT/ Cologuard? Yes - no Care Gap present      If the patient is female:    4. For patients aged 41-77: Has the patient had a mammogram within the past 2 years? Yes - no Care Gap present      5. For patients aged 21-65: Has the patient had a pap smear? NA - based on age or sex         Chief Complaint   Patient presents with    Annual Wellness Visit     Last on in 2021           Jey Amin is a 74 y.o. female and presents for Annual Medicare Wellness Visit. Assessment of cognitive impairment: Alert and oriented x 3. Depression Screen:   3 most recent PHQ Screens 8/29/2022   Little interest or pleasure in doing things Not at all   Feeling down, depressed, irritable, or hopeless Not at all   Total Score PHQ 2 0       Fall Risk Assessment:    Fall Risk Assessment, last 12 mths 8/29/2022   Able to walk? Yes   Fall in past 12 months? 0   Do you feel unsteady? 0   Are you worried about falling 0   Is the gait abnormal? -   Number of falls in past 12 months -   Fall with injury? -     .  Abuse Screening Questionnaire 1/16/2023   Do you ever feel afraid of your partner? N   Are you in a relationship with someone who physically or mentally threatens you? N   Is it safe for you to go home? Y          Activities of Daily Living:  Self-care.    Requires assistance with: no ADLs  Patient handle his/her own medications  Yes Use of pill box  Yes    ADL Assessment 1/16/2023   Feeding yourself No Help Needed   Getting from bed to chair No Help Needed   Getting dressed No Help Needed   Bathing or showering No Help Needed   Walk across the room (includes cane/walker) No Help Needed   Using the telphone No Help Needed   Taking your medications No Help Needed   Preparing meals No Help Needed   Managing money (expenses/bills) No Help Needed   Moderately strenuous housework (laundry) No Help Needed   Shopping for personal items (toiletries/medicines) No Help Needed   Shopping for groceries No Help Needed   Driving No Help Needed   Climbing a flight of stairs No Help Needed   Getting to places beyond walking distances No Help Needed      Health Maintenance:  Daily Aspirin: no  Bone Density: up to date  Glaucoma Screening: yes  Immunizations:    Tetanus: unknown. Influenza: No.  Shingles: No.  PPSV-23: No. Prevnar-13: No. Covid19: up to date    Cancer screening:    Cervical: N/A age . Breast: up to date. Colon: up to date. Alcohol Risk Screen:   On any occasion during the past 3 months, have you had more than 3 drinks(female) or 4 drinks (male) containing alcohol in one? No  Do you average more than 7 drinks (female) or 14 drinks (male) per week? No  Type and amount:None     Hearing Loss:  Hearing is good. Vision Loss:   Wears glasses, contact lenses, or have any other visual impairment  Yes    Adult Nutrition Screen:  No risk factors noted. Advance Care Planning:   End of Life Planning: has NO advanced directive  - add't info requested. Referral to SW: yes,   Vy Zapata ACP-Facilitator appointment yes      Medications/Allergies: Reviewed with patient  Prior to Admission medications    Medication Sig Start Date End Date Taking? Authorizing Provider   lisinopriL (PRINIVIL, ZESTRIL) 10 mg tablet Take 1 tablet by mouth once daily for 90 days 11/30/22  Yes Debbie Nix MD   amLODIPine (NORVASC) 10 mg tablet Take 1 Tablet by mouth daily for 90 days.  11/23/22 2/21/23 Yes Debbie Nix MD       Allergies   Allergen Reactions    Aspirin Other (comments)     \"TIGHTNESS OF CHEST\"       Objective  Visit Vitals  /70 (BP 1 Location: Left upper arm, BP Patient Position: Sitting)   Pulse 75   Temp 97.1 °F (36.2 °C) (Temporal)   Resp 18   Ht 4' 8\" (1.422 m)   Wt 151 lb 9.6 oz (68.8 kg)   SpO2 98%   BMI 33.99 kg/m²        Problem List: Reviewed with patient and discussed risk factors.     Patient Active Problem List   Diagnosis Code    Hallux rigidus of left foot M20.22    Hallux valgus (acquired), right foot M20.11    Eczema L30.9    Essential hypertension I10    Arthritis M19.90    Osteopenia of multiple sites M85.89    Mixed hyperlipidemia E78.2       PSH: Reviewed with patient  Past Surgical History:   Procedure Laterality Date    COLONOSCOPY N/A 2020    COLONOSCOPY performed by Sofía Ruiz MD at [facility]    HX HEMORRHOIDECTOMY      HX OTHER SURGICAL Bilateral     bunionectomy    HX OTHER SURGICAL  2021    EUS,  Rigid sigmoidoscopy, Hemorrhoidectomy-Dr. Amaya Richard    AGAPITO STEREO  BX BREAST LT 1ST LESION W/CLIP AND SPECIMEN Left long ago    Benign    NM UNLISTED PROCEDURE ABDOMEN PERITONEUM & OMENTUM      gallbladder        SH: Reviewed with patient  Social History     Tobacco Use    Smoking status: Never    Smokeless tobacco: Never    Tobacco comments:     \"TRIED A FEW TIMES\"   Vaping Use    Vaping Use: Never used   Substance Use Topics    Alcohol use: No    Drug use: No       FH: Reviewed with patient  Family History   Problem Relation Age of Onset    Hypertension Mother     Heart Disease Father          OF HEART ATTACK IN EARLY 60'S    Heart Disease Sister     Hypertension Sister     Hypertension Brother     Alcohol abuse Sister     Alcohol abuse Sister     Gout Sister     Stroke Sister     Heart Disease Sister     Other Sister         ADDICTION TO NARCOTICS    No Known Problems Sister     Stroke Sister     Heart Disease Sister     Hypertension Sister     No Known Problems Sister     Cancer Sister         OVARIAN    Anesth Problems Neg Hx        Current medical providers:    Patient Care Team:  Chloe rOr MD as PCP - General (Internal Medicine Physician)  Chloe Orr MD as PCP - [facility] Empaneled Provider  Laureen Judd MD (General Surgery)    Plan:    Diagnoses and all orders for this visit:    Medicare annual wellness visit, subsequent  . Age appropriate Health screening and immunization discussed with patient. She does not believe on certain vaccines and declines getting it. Well ware of the consequences. ACP (advance care planning)  -     REFERRAL TO [address] [place] [address]   Topic Date Due    COVID-19 Vaccine (4 - Booster for Pfizer series) 02/04/2022    Pneumococcal 65+ years (1 - PCV) 12/26/2023 (Originally 10/11/2013)    Flu Vaccine (1) 07/17/2024 (Originally 8/1/2022)    Shingles Vaccine (1 of 2) 07/24/2024 (Originally 10/11/1998)    DTaP/Tdap/Td series (1 - Tdap) 12/31/2025 (Originally 10/11/1969)    GFR test (Diabetes, CKD 3-4, OR last GFR 15-59)  06/09/2023    Depression Screen  08/29/2023    Breast Cancer Screen Mammogram  02/08/2024    Lipid Screen  06/09/2027    Colorectal Cancer Screening Combo  02/04/2030    Hepatitis C Screening  Completed    Bone Densitometry (Dexa) Screening  Completed          Urinary/ Fecal Incontinence: No problems     Regular physical exercise: rides stationary bile few times a week. Patient verbalized understanding of information presented. AVS and Medicare Part B Preventive Services Table printed and given to pt and reviewed. See table for findings under Recommendation and Scheduled. All of the patient's questions were answered.

## 2023-01-16 NOTE — PROGRESS NOTES
Fito Noble (: 1950) is a 74 y.o. female, established patient, here for evaluation of the following chief complaint(s): Annual Wellness Visit (Last on in )       ASSESSMENT/PLAN:  Below is the assessment and plan developed based on review of pertinent history, physical exam, labs, studies, and medications. 3. Essential hypertension  -     METABOLIC PANEL, COMPREHENSIVE; Future  -     CBC W/O DIFF; Future  4. Mixed hyperlipidemia  -     LIPID PANEL; Future  5. Osteopenia of multiple sites  Recommended weightbearing exercises 2-3 times a week and vitamin D daily. Calcium 3-4 times a week. 6. Arthritis  Told her exercise and daily walking should help her with the arthritis pain she can take Tylenol at as needed. 7. KENYATTA on CPAP  Doing well on CPAP. 8. Hyperpigmentation  -     fluocinolone-hydroquinone-tretinoin (TRI-KIKE) 0.01-4-0.05 % topical cream; Apply  to affected area nightly for 30 days. , Normal, Disp-30 g, R-0            SUBJECTIVE/OBJECTIVE:  Seen today for her follow-up on chronic conditions as well. Her blood pressure readings have been running well within the normal range. She is fasting for her labs and would like to get her cholesterol checked. She is trying to follow low calorie diet and has gained few pound which she thinks due to the heavy clothing. She did admit that she has not been walking as much as she used to but would like to go back on weightbearing exercises and treadmill few times a week. She has been taking vitamin D and calcium for osteopenia. She is wondering if she should continue using CPAP as its not comfortable to sleep with that. She has been having skin discoloration in her both cheeks since her cataract surgery and would like to take medication for that. Her arthritis pain was improved when she started doing exercise and will start doing it again.     Patient Active Problem List   Diagnosis Code    Hallux rigidus of left foot M20.22    Hallux valgus (acquired), right foot M20.11    Eczema L30.9    Essential hypertension I10    Arthritis M19.90    Osteopenia of multiple sites M85.89    Mixed hyperlipidemia E78.2    KENYATTA on CPAP G47.33, Z99.89        Current Outpatient Medications on File Prior to Visit   Medication Sig Dispense Refill    lisinopriL (PRINIVIL, ZESTRIL) 10 mg tablet Take 1 tablet by mouth once daily for 90 days 90 Tablet 0    amLODIPine (NORVASC) 10 mg tablet Take 1 Tablet by mouth daily for 90 days. 90 Tablet 0     No current facility-administered medications on file prior to visit.        Allergies   Allergen Reactions    Aspirin Other (comments)     \"TIGHTNESS OF CHEST\"       Past Medical History:   Diagnosis Date    Arthritis     GERD (gastroesophageal reflux disease)     Hemorrhoids 2020    Hypertension     Menopause     LMP-       Past Surgical History:   Procedure Laterality Date    COLONOSCOPY N/A 2020    COLONOSCOPY performed by Job Tan MD at [facility] OR    HX HEMORRHOIDECTOMY      HX OTHER SURGICAL Bilateral     bunionectomy    HX OTHER SURGICAL  2021    EUS,  Rigid sigmoidoscopy, Hemorrhoidectomy-Dr. Cochran Pump    AGAPITO STEREO  BX BREAST LT 1ST LESION W/CLIP AND SPECIMEN Left long ago    Benign    KY UNLISTED PROCEDURE ABDOMEN PERITONEUM & OMENTUM      gallbladder       Family History   Problem Relation Age of Onset    Hypertension Mother     Heart Disease Father          OF HEART ATTACK IN EARLY 60'S    Heart Disease Sister     Hypertension Sister     Hypertension Brother     Alcohol abuse Sister     Alcohol abuse Sister     Gout Sister     Stroke Sister     Heart Disease Sister     Other Sister         ADDICTION TO NARCOTICS    No Known Problems Sister     Stroke Sister     Heart Disease Sister     Hypertension Sister     No Known Problems Sister     Cancer Sister         OVARIAN    Anesth Problems Neg Hx        Social History     Socioeconomic History    Marital status:      Spouse name: Not on file    Number of children: Not on file    Years of education: Not on file    Highest education level: Not on file   Occupational History    Not on file   Tobacco Use    Smoking status: Never    Smokeless tobacco: Never    Tobacco comments:     \"TRIED A FEW TIMES\"   Vaping Use    Vaping Use: Never used   Substance and Sexual Activity    Alcohol use: No    Drug use: No    Sexual activity: Not Currently   Other Topics Concern    Not on file   Social History Narrative    Not on file     Social Determinants of Health     Financial Resource Strain: Low Risk     Difficulty of Paying Living Expenses: Not hard at all   Food Insecurity: No Food Insecurity    Worried About Running Out of Food in the Last Year: Never true    Ran Out of Food in the Last Year: Never true   Transportation Needs: Not on file   Physical Activity: Not on file   Stress: Not on file   Social Connections: Not on file   Intimate Partner Violence: Not on file   Housing Stability: Not on file       No visits with results within 3 Month(s) from this visit. Latest known visit with results is:   Office Visit on 06/07/2022   Component Date Value Ref Range Status    Glucose 06/09/2022 95  65 - 99 mg/dL Final    BUN 06/09/2022 23  8 - 27 mg/dL Final    Creatinine 06/09/2022 1.03 (A)  0.57 - 1.00 mg/dL Final    eGFR 06/09/2022 57 (A)  >59 mL/min/1.73 Final    BUN/Creatinine ratio 06/09/2022 22  12 - 28 Final    Sodium 06/09/2022 140  134 - 144 mmol/L Final    Potassium 06/09/2022 4.7  3.5 - 5.2 mmol/L Final    Chloride 06/09/2022 102  96 - 106 mmol/L Final    CO2 06/09/2022 22  20 - 29 mmol/L Final    Calcium 06/09/2022 10.0  8.7 - 10.3 mg/dL Final    Protein, total 06/09/2022 8.0  6.0 - 8.5 g/dL Final    Albumin 06/09/2022 4.8 (A)  3.7 - 4.7 g/dL Final    GLOBULIN, TOTAL 06/09/2022 3.2  1.5 - 4.5 g/dL Final    A-G Ratio 06/09/2022 1.5  1.2 - 2.2 Final    Bilirubin, total 06/09/2022 1.0  0.0 - 1.2 mg/dL Final    Alk.  phosphatase 06/09/2022 74  44 - 121 IU/L Final    AST (SGOT) 06/09/2022 17  0 - 40 IU/L Final    ALT (SGPT) 06/09/2022 18  0 - 32 IU/L Final    Cholesterol, total 06/09/2022 227 (A)  100 - 199 mg/dL Final    Triglyceride 06/09/2022 114  0 - 149 mg/dL Final    HDL Cholesterol 06/09/2022 59  >39 mg/dL Final    VLDL, calculated 06/09/2022 20  5 - 40 mg/dL Final    LDL, calculated 06/09/2022 148 (A)  0 - 99 mg/dL Final    Hemoglobin A1c 06/09/2022 4.8  4.8 - 5.6 % Final    Comment:          Prediabetes: 5.7 - 6.4           Diabetes: >6.4           Glycemic control for adults with diabetes: <7.0      Estimated average glucose 06/09/2022 91  mg/dL Final    Visit Vitals  /70 (BP 1 Location: Left upper arm, BP Patient Position: Sitting)   Pulse 75   Temp 97.1 °F (36.2 °C) (Temporal)   Resp 18   Ht 4' 8\" (1.422 m)   Wt 151 lb 9.6 oz (68.8 kg)   SpO2 98%   BMI 33.99 kg/m²        Review of Systems   Constitutional:  Negative for activity change, fatigue and unexpected weight change. HENT:  Negative for congestion, hearing loss, rhinorrhea and sore throat. Eyes:  Negative for discharge. Respiratory:  Negative for cough, chest tightness and shortness of breath. Cardiovascular:  Negative for leg swelling. Gastrointestinal:  Negative for abdominal pain, constipation and diarrhea. Genitourinary:  Negative for dysuria, flank pain, frequency and urgency. Musculoskeletal:  Negative for arthralgias, back pain and myalgias. Skin:  Negative for color change and rash. Neurological:  Negative for dizziness, light-headedness and headaches. Psychiatric/Behavioral:  Negative for dysphoric mood and sleep disturbance. The patient is not nervous/anxious. Physical Exam    Vitals and nursing note reviewed. Constitutional:       Appearance: Normal appearance. obese. Eyes:      Extraocular Movements: Extraocular movements intact. Pupils: Pupils are equal, round, and reactive to light.    Cardiovascular:      Rate and Rhythm: Normal rate and regular rhythm. Pulmonary:      Effort: Pulmonary effort is normal.      Breath sounds: Normal breath sounds. Abdominal:      General: Bowel sounds are normal. There is no distension. Palpations: Abdomen is soft. Musculoskeletal:         General: No swelling. Normal range of motion. Cervical back: Normal range of motion and neck supple. Right lower leg: No edema. Left lower leg: No edema. Neurological:      General: No focal deficit present. Mental Status:  Alert and oriented to person, place, and time. Motor: No weakness. Psychiatric:         Mood and Affect: Mood normal.         Behavior: Behavior normal.          An electronic signature was used to authenticate this note.   -- Crys Hills MD

## 2023-01-17 ENCOUNTER — PATIENT OUTREACH (OUTPATIENT)
Dept: CASE MANAGEMENT | Age: 73
End: 2023-01-17

## 2023-01-17 ENCOUNTER — TRANSCRIBE ORDER (OUTPATIENT)
Dept: SCHEDULING | Age: 73
End: 2023-01-17

## 2023-01-17 DIAGNOSIS — Z12.31 SCREENING MAMMOGRAM FOR BREAST CANCER: Primary | ICD-10-CM

## 2023-01-17 NOTE — ACP (ADVANCE CARE PLANNING)
Advance Care Planning   Ambulatory ACP Specialist Patient Outreach    Date:  1/17/2023    ACP Specialist:  Shanthi Shelby    Outreach call to patient in follow-up to ACP Specialist referral from:  Citlalli Jimenes MD    [x] PCP  [] Provider   [] Ambulatory Care Management [] Other     For:                  [x] Advance Directive Assistance              [] Complete Portable DNR order              [] Complete POST/MOST              [] Code Status Discussion             [] Discuss Goals of Care             [] Early ACP Decision-Making              [] Other (Specify)    Date Referral Received:  1/16/2022    Today's Outreach:  [x] First   [] Second  [] Third       Third outreach made by: [] Phone  [] Email / mail    [] RedLassohart     Intervention:  [x] Spoke with Patient   [] Left VM requesting return call      Outcome:  Patient is agreeable to a conversation with ACP Specialist Clint Morales on Thursday, January 26, 2023 at 9:00 AM.   A copy of [facility] and ACP information sheets mailed to patient's confirmed home address on file. Next Step:   [x] ACP scheduled conversation  [] Outreach again in one week               [x] Email / Mail ACP Info Sheets  [x] Email / Mail Advance Directive   [] Closing referral.  Routing closure to referring provider/staff and to ACP Specialist . [] Closure letter mailed to patient with invitation to contact ACP Specialist if / when ready.   Thank you for this referral.

## 2023-01-19 LAB
ALBUMIN SERPL-MCNC: 4.5 G/DL (ref 3.7–4.7)
ALBUMIN/GLOB SERPL: 1.3 {RATIO} (ref 1.2–2.2)
ALP SERPL-CCNC: 83 IU/L (ref 44–121)
ALT SERPL-CCNC: 14 IU/L (ref 0–32)
AST SERPL-CCNC: 14 IU/L (ref 0–40)
BILIRUB SERPL-MCNC: 0.8 MG/DL (ref 0–1.2)
BUN SERPL-MCNC: 11 MG/DL (ref 8–27)
BUN/CREAT SERPL: 9 (ref 12–28)
CALCIUM SERPL-MCNC: 9.6 MG/DL (ref 8.7–10.3)
CHLORIDE SERPL-SCNC: 107 MMOL/L (ref 96–106)
CHOLEST SERPL-MCNC: 206 MG/DL (ref 100–199)
CO2 SERPL-SCNC: 22 MMOL/L (ref 20–29)
CREAT SERPL-MCNC: 1.2 MG/DL (ref 0.57–1)
EGFR: 47 ML/MIN/1.73
ERYTHROCYTE [DISTWIDTH] IN BLOOD BY AUTOMATED COUNT: 11.5 % (ref 11.7–15.4)
GLOBULIN SER CALC-MCNC: 3.4 G/DL (ref 1.5–4.5)
GLUCOSE SERPL-MCNC: 96 MG/DL (ref 70–99)
HCT VFR BLD AUTO: 38.8 % (ref 34–46.6)
HDLC SERPL-MCNC: 46 MG/DL
HGB BLD-MCNC: 12.1 G/DL (ref 11.1–15.9)
LDLC SERPL CALC-MCNC: 138 MG/DL (ref 0–99)
MCH RBC QN AUTO: 28 PG (ref 26.6–33)
MCHC RBC AUTO-ENTMCNC: 31.2 G/DL (ref 31.5–35.7)
MCV RBC AUTO: 90 FL (ref 79–97)
PLATELET # BLD AUTO: 303 X10E3/UL (ref 150–450)
POTASSIUM SERPL-SCNC: 4.7 MMOL/L (ref 3.5–5.2)
PROT SERPL-MCNC: 7.9 G/DL (ref 6–8.5)
RBC # BLD AUTO: 4.32 X10E6/UL (ref 3.77–5.28)
SODIUM SERPL-SCNC: 145 MMOL/L (ref 134–144)
TRIGL SERPL-MCNC: 124 MG/DL (ref 0–149)
VLDLC SERPL CALC-MCNC: 22 MG/DL (ref 5–40)
WBC # BLD AUTO: 5.8 X10E3/UL (ref 3.4–10.8)

## 2023-01-20 NOTE — PROGRESS NOTES
Let her know kidney numbers are going up. Needs to drink 7-8 glasses of water daily. Cholesterol numbers are improving. Needs to walk 1-2 miles daily. Cut down on Rice, bread and pasta.

## 2023-01-25 ENCOUNTER — DOCUMENTATION ONLY (OUTPATIENT)
Dept: CASE MANAGEMENT | Age: 73
End: 2023-01-25

## 2023-01-25 NOTE — ACP (ADVANCE CARE PLANNING)
Advance Care Planning   Ambulatory ACP Specialist Patient Outreach    Date:  1/25/2023    ACP Specialist:  Myles Doll LCSW    Outreach call to patient in follow-up to ACP Specialist referral from:    [x] PCP  [] Provider   [] Ambulatory Care Management [] Other     For:                  [x] Advance Directive Assistance              [] Complete Portable DNR order              [] Complete POST/MOST              [] Code Status Discussion             [] Discuss Goals of Care             [] Early ACP Decision-Making              [] Other (Specify)    Date Referral Received: 1/16/2023    Today's Outreach:  [] First   [x] Second  [] Third       Third outreach made by: [] Phone  [] Email / mail    [] Temptsterhart     Intervention:  [x] Spoke with Patient   [] Left VM requesting return call      Outcome: ACP Specialist called patient for reminder call regarding scheduled telephone appointment on 1/26/2023 for ACP discussion and she requested to reschedule appointment for 1/31/2023 at 9:30am.     Next Step:   [x] ACP scheduled conversation  [] Outreach again in one week               [] Email / Mail [address] [place]  [] Email / Mail Advance Directive   [] Closing referral.  Routing closure to referring provider/staff and to ACP Specialist . [] Closure letter mailed to patient with invitation to contact ACP Specialist if / when ready.   Thank you for this referral.

## 2023-01-31 ENCOUNTER — DOCUMENTATION ONLY (OUTPATIENT)
Dept: CASE MANAGEMENT | Age: 73
End: 2023-01-31

## 2023-01-31 NOTE — ACP (ADVANCE CARE PLANNING)
Advance Care Planning     Advance Care Planning Clinical Specialist  Conversation Note      Date of ACP Conversation: 01/31/23    Conversation Conducted with:  Patient with Decision Making Capacity    ACP Clinical Specialist: Erin Schneider, [address] Health Decision Maker:    Current Designated Health Care Decision Maker:   Pt has not designated a health care decision maker at this time. She reports that she wants to talk with her family and determine who she will appoint and who is willing to be a decision maker for her. Currently, her legal next of kin is her spouse, Si Mate and he would be the legal HCDM until patient designates someone different if she wishes. Care Preferences    Hospitalization: \"If your health worsens and it becomes clear that your chance of recovery is unlikely, what would your preference be regarding hospitalization? \"    Choice:  []  The patient wants hospitalization  []  The patient prefers comfort-focused treatment without hospitalization. Pt reports that she is unsure about this at this time and will need to think on her wishes of hospitalization versus comfort focused treatment without hospitalization. Ventilation: \"If you were in your present state of health and suddenly became very ill and were unable to breathe on your own, what would your preference be about the use of a ventilator (breathing machine) if it were available to you? \"      If patient would desire the use of a ventilator (breathing machine), answer \"yes\", if not \"no\":yes    \"If your health worsens and it becomes clear that your chance of recovery is unlikely, what would your preference be about the use of a ventilator (breathing machine) if it were available to you? \"     Would the patient desire the use of a ventilator (breathing machine)? NO      Resuscitation  \"CPR works best to restart the heart when there is a sudden event, like a heart attack, in someone who is otherwise healthy. Unfortunately, CPR does not typically restart the heart for people who have serious health conditions or who are very sick. \"    \"In the event your heart stopped as a result of an underlying serious health condition, would you want attempts to be made to restart your heart (answer \"yes\" for attempt to resuscitate) or would you prefer a natural death (answer \"no\" for do not attempt to resuscitate)? \" \"Yes if there is a chance that I will recover, if there is no hope of recovery then I would not want CPR as there would be no point in it. \"      [x] Yes  [] No   Educated Patient / Glendy Jose regarding differences between Advance Directives and portable DNR orders. Length of ACP Conversation in minutes:  30    Conversation Outcomes:  [x] ACP discussion completed  [] Existing advance directive reviewed with patient; no changes to patient's previously recorded wishes   [] New Advance Directive completed   [] Portable Do Not Resuscitate prepared for Provider review and signature  [] POLST/POST/MOLST/MOST prepared for Provider review and signature      Follow-up plan:    [] Schedule follow-up conversation to continue planning  [] Referred individual to Provider for additional questions/concerns   [x] Advised patient/agent/surrogate to review completed ACP document and update if needed with changes in condition, patient preferences or care setting     [x] This note routed to one or more involved healthcare providers    ACP Specialist met with patient for scheduled ACP appointment. ACP Specialist completed ACP discussion and talked about some medical wishes around hospitalization, use of ventilator and use of CPR. Pt presented as alert and oriented throughout this entire discussion and provided consistent answers regarding her medical decisions and wishes.      Pt reports that she is unsure about her wishes around hospitalization versus comfort focused treatment without hospitalization if her health should decline to the point of no hope of recovery. Pt states that she would want to talk about this more with her family before making a finalized decision around this. Pt states that she would want the use of a ventilator and CPR in her current condition and states that is her health declined to the point of no hope for recovery that she would not want neither the use of a ventilator nor CPR. Pt also stated that she wanted to talk with family about who is willing to be her health care decision makers and her health care instructions in the [place] AMD, Section II. ACP Specialist went through the [facility] document with patient during this appointment as she was stating that her preference may be to complete the document on her own with her family. Pt was provided contact information for this ACP Specialist so that she can call back to schedule an appointment to be provided assistance with completing the AMD if needed. Pt currently states that she does not have any questions or concerns about completing the AMD with her family but would call back if she found that she needed assistance. PLAN: This referral will be closed at this time with the expectation that patient will complete document with her family and provide copy to her PCP office to be uploaded into her medical records or reach back out to this ACP Specialist for additional assistance. Leticia Edward Thersia Bloch, LISW-CP  Advance Care   [phone]

## 2023-02-09 ENCOUNTER — HOSPITAL ENCOUNTER (OUTPATIENT)
Dept: MAMMOGRAPHY | Age: 73
Discharge: HOME | End: 2023-02-09
Attending: INTERNAL MEDICINE
Payer: MEDICARE

## 2023-02-09 DIAGNOSIS — Z12.31 SCREENING MAMMOGRAM FOR BREAST CANCER: ICD-10-CM

## 2023-02-09 PROCEDURE — 77067 SCR MAMMO BI INCL CAD: CPT

## 2023-02-27 DIAGNOSIS — I10 ESSENTIAL HYPERTENSION: ICD-10-CM

## 2023-02-27 NOTE — TELEPHONE ENCOUNTER
Patient requested. PCP: Ramana Mahan MD    Last appt: 1/16/2023  No future appointments. Requested Prescriptions     Pending Prescriptions Disp Refills    amLODIPine (NORVASC) 10 mg tablet 90 Tablet 0     Sig: Take 1 Tablet by mouth daily for 90 days.     lisinopriL (PRINIVIL, ZESTRIL) 10 mg tablet 90 Tablet 0       Prior labs and Blood pressures:  BP Readings from Last 3 Encounters:   01/16/23 127/70   09/07/22 121/76   08/29/22 126/80     Lab Results   Component Value Date/Time    Sodium 145 (H) 01/18/2023 09:44 AM    Potassium 4.7 01/18/2023 09:44 AM    Chloride 107 (H) 01/18/2023 09:44 AM    CO2 22 01/18/2023 09:44 AM    Anion gap 8 04/15/2021 09:44 AM    Glucose 96 01/18/2023 09:44 AM    BUN 11 01/18/2023 09:44 AM    Creatinine 1.20 (H) 01/18/2023 09:44 AM    BUN/Creatinine ratio 9 (L) 01/18/2023 09:44 AM    GFR est AA 55 (L) 11/12/2021 09:23 AM    GFR est non-AA 47 (L) 11/12/2021 09:23 AM    Calcium 9.6 01/18/2023 09:44 AM

## 2023-02-28 RX ORDER — LISINOPRIL 10 MG/1
10 TABLET ORAL DAILY
Qty: 90 TABLET | Refills: 0 | Status: SHIPPED | OUTPATIENT
Start: 2023-02-28 | End: 2023-05-29

## 2023-02-28 RX ORDER — AMLODIPINE BESYLATE 10 MG/1
10 TABLET ORAL DAILY
Qty: 90 TABLET | Refills: 0 | Status: SHIPPED | OUTPATIENT
Start: 2023-02-28 | End: 2023-05-29

## 2023-06-09 ENCOUNTER — OFFICE VISIT (OUTPATIENT)
Dept: PRIMARY CARE CLINIC | Facility: CLINIC | Age: 73
End: 2023-06-09
Payer: MEDICAID

## 2023-06-09 ENCOUNTER — TELEPHONE (OUTPATIENT)
Dept: PRIMARY CARE CLINIC | Facility: CLINIC | Age: 73
End: 2023-06-09

## 2023-06-09 VITALS
RESPIRATION RATE: 16 BRPM | SYSTOLIC BLOOD PRESSURE: 120 MMHG | TEMPERATURE: 97.1 F | WEIGHT: 149.2 LBS | BODY MASS INDEX: 33.56 KG/M2 | HEIGHT: 56 IN | OXYGEN SATURATION: 100 % | HEART RATE: 72 BPM | DIASTOLIC BLOOD PRESSURE: 76 MMHG

## 2023-06-09 DIAGNOSIS — R79.89 ELEVATED SERUM CREATININE: ICD-10-CM

## 2023-06-09 DIAGNOSIS — E78.2 MIXED HYPERLIPIDEMIA: ICD-10-CM

## 2023-06-09 DIAGNOSIS — I10 ESSENTIAL HYPERTENSION: Primary | ICD-10-CM

## 2023-06-09 DIAGNOSIS — E66.9 OBESITY (BMI 30.0-34.9): ICD-10-CM

## 2023-06-09 PROCEDURE — 99213 OFFICE O/P EST LOW 20 MIN: CPT | Performed by: INTERNAL MEDICINE

## 2023-06-09 PROCEDURE — 3078F DIAST BP <80 MM HG: CPT | Performed by: INTERNAL MEDICINE

## 2023-06-09 PROCEDURE — 1123F ACP DISCUSS/DSCN MKR DOCD: CPT | Performed by: INTERNAL MEDICINE

## 2023-06-09 PROCEDURE — 3074F SYST BP LT 130 MM HG: CPT | Performed by: INTERNAL MEDICINE

## 2023-06-09 RX ORDER — LISINOPRIL 10 MG/1
10 TABLET ORAL DAILY
Qty: 90 TABLET | Refills: 0 | Status: CANCELLED | OUTPATIENT
Start: 2023-06-09

## 2023-06-09 RX ORDER — LISINOPRIL 10 MG/1
10 TABLET ORAL DAILY
Qty: 90 TABLET | Refills: 1 | Status: SHIPPED | OUTPATIENT
Start: 2023-06-09

## 2023-06-09 RX ORDER — AMLODIPINE BESYLATE 10 MG/1
10 TABLET ORAL DAILY
COMMUNITY
End: 2023-06-09 | Stop reason: ALTCHOICE

## 2023-06-09 RX ORDER — LISINOPRIL 10 MG/1
10 TABLET ORAL DAILY
COMMUNITY
End: 2023-06-09 | Stop reason: ALTCHOICE

## 2023-06-09 RX ORDER — AMLODIPINE BESYLATE 5 MG/1
5 TABLET ORAL DAILY
Qty: 90 TABLET | Refills: 0 | Status: SHIPPED | OUTPATIENT
Start: 2023-06-09

## 2023-06-09 RX ORDER — AMLODIPINE BESYLATE 10 MG/1
10 TABLET ORAL DAILY
Qty: 90 TABLET | Refills: 0 | Status: CANCELLED | OUTPATIENT
Start: 2023-06-09

## 2023-06-09 SDOH — ECONOMIC STABILITY: FOOD INSECURITY: WITHIN THE PAST 12 MONTHS, YOU WORRIED THAT YOUR FOOD WOULD RUN OUT BEFORE YOU GOT MONEY TO BUY MORE.: NEVER TRUE

## 2023-06-09 SDOH — ECONOMIC STABILITY: INCOME INSECURITY: HOW HARD IS IT FOR YOU TO PAY FOR THE VERY BASICS LIKE FOOD, HOUSING, MEDICAL CARE, AND HEATING?: NOT HARD AT ALL

## 2023-06-09 SDOH — ECONOMIC STABILITY: FOOD INSECURITY: WITHIN THE PAST 12 MONTHS, THE FOOD YOU BOUGHT JUST DIDN'T LAST AND YOU DIDN'T HAVE MONEY TO GET MORE.: NEVER TRUE

## 2023-06-09 SDOH — ECONOMIC STABILITY: HOUSING INSECURITY
IN THE LAST 12 MONTHS, WAS THERE A TIME WHEN YOU DID NOT HAVE A STEADY PLACE TO SLEEP OR SLEPT IN A SHELTER (INCLUDING NOW)?: NO

## 2023-06-09 ASSESSMENT — ENCOUNTER SYMPTOMS
COLOR CHANGE: 0
ABDOMINAL PAIN: 0
SORE THROAT: 0
RHINORRHEA: 0
COUGH: 0
EYE DISCHARGE: 0
CHEST TIGHTNESS: 0
DIARRHEA: 0
BACK PAIN: 0
CONSTIPATION: 0
SHORTNESS OF BREATH: 0

## 2023-06-09 ASSESSMENT — PATIENT HEALTH QUESTIONNAIRE - PHQ9
SUM OF ALL RESPONSES TO PHQ QUESTIONS 1-9: 0
SUM OF ALL RESPONSES TO PHQ QUESTIONS 1-9: 0
SUM OF ALL RESPONSES TO PHQ9 QUESTIONS 1 & 2: 0
SUM OF ALL RESPONSES TO PHQ QUESTIONS 1-9: 0
1. LITTLE INTEREST OR PLEASURE IN DOING THINGS: 0
SUM OF ALL RESPONSES TO PHQ QUESTIONS 1-9: 0
2. FEELING DOWN, DEPRESSED OR HOPELESS: 0

## 2023-06-09 NOTE — TELEPHONE ENCOUNTER
Spoke to pt and said her [address] has already been done for this year and isn't due until Jan next year. I asked if she was coming in for anything else and she said 6 month follow up. I said ok, I will change and we will see you at 11:15am. She said thank you.

## 2023-06-09 NOTE — PROGRESS NOTES
1. \"Have you been to the ER, urgent care clinic since your last visit? Hospitalized since your last visit? \" No    2. \"Have you seen or consulted any other health care providers outside of the [address] [place] [address] since your last visit? \" No     3. For patients aged 39-70: Has the patient had a colonoscopy / FIT/ Cologuard? Yes - no Care Gap present      If the patient is female:    4. For patients aged 41-77: Has the patient had a mammogram within the past 2 years? Yes - no Care Gap present      5. For patients aged 21-65: Has the patient had a pap smear? NA - based on age or sex       . Chief Complaint   Patient presents with    Follow-up     Pt is ok with scribe.
Drug use: No    Sexual activity: Not on file   Other Topics Concern    Not on file   Social History Narrative    Not on file     Social Determinants of Health     Financial Resource Strain: Low Risk     Difficulty of Paying Living Expenses: Not hard at all   Food Insecurity: No Food Insecurity    Worried About [address] in the Last Year: Never true    [address] in the Last Year: Never true   Transportation Needs: Unknown    Lack of Transportation (Medical): Not on file    Lack of Transportation (Non-Medical): No   Physical Activity: Not on file   Stress: Not on file   Social Connections: Not on file   Intimate Partner Violence: Not on file   Housing Stability: Unknown    Unable to Pay for Housing in the Last Year: Not on file    Number of Places Lived in the Last Year: Not on file    Unstable Housing in the Last Year: No       No visits with results within 3 Month(s) from this visit.    Latest known visit with results is:   Office Visit on 01/16/2023   Component Date Value Ref Range Status    Glucose 01/18/2023 96  70 - 99 mg/dL Final    BUN 01/18/2023 11  8 - 27 mg/dL Final    Creatinine 01/18/2023 1.20 (H)  0.57 - 1.00 mg/dL Final    Est, Glomerular Filtration Rate 01/18/2023 47 (L)  >59 mL/min/1.73 Final    Bun/Cre Ratio 01/18/2023 9 (L)  12 - 28 NA Final    Sodium 01/18/2023 145 (H)  134 - 144 mmol/L Final    Potassium 01/18/2023 4.7  3.5 - 5.2 mmol/L Final    Chloride 01/18/2023 107 (H)  96 - 106 mmol/L Final    CO2 01/18/2023 22  20 - 29 mmol/L Final    Calcium 01/18/2023 9.6  8.7 - 10.3 mg/dL Final    Total Protein 01/18/2023 7.9  6.0 - 8.5 g/dL Final    Albumin 01/18/2023 4.5  3.7 - 4.7 g/dL Final    Globulin, Total 01/18/2023 3.4  1.5 - 4.5 g/dL Final    Albumin/Globulin Ratio 01/18/2023 1.3  1.2 - 2.2 NA Final    Total Bilirubin 01/18/2023 0.8  0.0 - 1.2 mg/dL Final    Alkaline Phosphatase 01/18/2023 83  44 - 121 IU/L Final    AST 01/18/2023 14  0 - 40 IU/L Final    ALT 01/18/2023 14  0 - 32

## 2023-06-11 LAB
ALBUMIN SERPL-MCNC: 4.3 G/DL (ref 3.7–4.7)
ALBUMIN/GLOB SERPL: 1.3 {RATIO} (ref 1.2–2.2)
ALP SERPL-CCNC: 87 IU/L (ref 44–121)
ALT SERPL-CCNC: 14 IU/L (ref 0–32)
AST SERPL-CCNC: 17 IU/L (ref 0–40)
BILIRUB SERPL-MCNC: 0.6 MG/DL (ref 0–1.2)
BUN SERPL-MCNC: 19 MG/DL (ref 8–27)
BUN/CREAT SERPL: 15 (ref 12–28)
CALCIUM SERPL-MCNC: 9.6 MG/DL (ref 8.7–10.3)
CHLORIDE SERPL-SCNC: 103 MMOL/L (ref 96–106)
CO2 SERPL-SCNC: 22 MMOL/L (ref 20–29)
CREAT SERPL-MCNC: 1.24 MG/DL (ref 0.57–1)
EGFRCR SERPLBLD CKD-EPI 2021: 46 ML/MIN/1.73
GLOBULIN SER CALC-MCNC: 3.3 G/DL (ref 1.5–4.5)
GLUCOSE SERPL-MCNC: 104 MG/DL (ref 70–99)
POTASSIUM SERPL-SCNC: 4.2 MMOL/L (ref 3.5–5.2)
PROT SERPL-MCNC: 7.6 G/DL (ref 6–8.5)
SODIUM SERPL-SCNC: 140 MMOL/L (ref 134–144)

## 2023-09-18 DIAGNOSIS — I10 ESSENTIAL HYPERTENSION: ICD-10-CM

## 2023-09-18 RX ORDER — AMLODIPINE BESYLATE 5 MG/1
5 TABLET ORAL DAILY
Qty: 90 TABLET | Refills: 0 | Status: SHIPPED | OUTPATIENT
Start: 2023-09-18

## 2023-09-18 RX ORDER — LISINOPRIL 10 MG/1
10 TABLET ORAL DAILY
Qty: 90 TABLET | Refills: 0 | Status: SHIPPED | OUTPATIENT
Start: 2023-09-18

## 2023-09-18 NOTE — TELEPHONE ENCOUNTER
Patient calling to get refill of amlodipine and lisinopril sent to the  [facility] at [address], [place]

## 2023-09-18 NOTE — TELEPHONE ENCOUNTER
Requested Prescriptions     Pending Prescriptions Disp Refills    amLODIPine (NORVASC) 5 MG tablet 90 tablet 0     Sig: Take 1 tablet by mouth daily    lisinopril (PRINIVIL;ZESTRIL) 10 MG tablet 90 tablet 0     Sig: Take 1 tablet by mouth daily        Last Visit 6/9/23  Last Refill 6/9/23

## 2024-01-08 ENCOUNTER — OFFICE VISIT (OUTPATIENT)
Dept: PRIMARY CARE CLINIC | Facility: CLINIC | Age: 74
End: 2024-01-08
Payer: MEDICAID

## 2024-01-08 VITALS
DIASTOLIC BLOOD PRESSURE: 90 MMHG | HEIGHT: 56 IN | BODY MASS INDEX: 34.42 KG/M2 | SYSTOLIC BLOOD PRESSURE: 140 MMHG | RESPIRATION RATE: 16 BRPM | OXYGEN SATURATION: 97 % | WEIGHT: 153 LBS | HEART RATE: 70 BPM | TEMPERATURE: 97.5 F

## 2024-01-08 DIAGNOSIS — I10 ESSENTIAL HYPERTENSION: Primary | ICD-10-CM

## 2024-01-08 DIAGNOSIS — G47.33 OSA (OBSTRUCTIVE SLEEP APNEA): ICD-10-CM

## 2024-01-08 DIAGNOSIS — E78.2 MIXED HYPERLIPIDEMIA: ICD-10-CM

## 2024-01-08 DIAGNOSIS — H04.123 DRY EYES, BILATERAL: ICD-10-CM

## 2024-01-08 DIAGNOSIS — M85.89 OSTEOPENIA OF MULTIPLE SITES: ICD-10-CM

## 2024-01-08 DIAGNOSIS — L21.0 DANDRUFF, ADULT: ICD-10-CM

## 2024-01-08 DIAGNOSIS — R79.89 ELEVATED SERUM CREATININE: ICD-10-CM

## 2024-01-08 LAB
ALBUMIN SERPL-MCNC: 3.9 G/DL (ref 3.5–5)
ALBUMIN/GLOB SERPL: 0.9 (ref 1.1–2.2)
ALP SERPL-CCNC: 73 U/L (ref 45–117)
ALT SERPL-CCNC: 28 U/L (ref 12–78)
ANION GAP SERPL CALC-SCNC: 4 MMOL/L (ref 5–15)
AST SERPL-CCNC: 16 U/L (ref 15–37)
BILIRUB SERPL-MCNC: 1 MG/DL (ref 0.2–1)
BUN SERPL-MCNC: 14 MG/DL (ref 6–20)
BUN/CREAT SERPL: 12 (ref 12–20)
CALCIUM SERPL-MCNC: 9.7 MG/DL (ref 8.5–10.1)
CHLORIDE SERPL-SCNC: 109 MMOL/L (ref 97–108)
CHOLEST SERPL-MCNC: 219 MG/DL
CO2 SERPL-SCNC: 28 MMOL/L (ref 21–32)
CREAT SERPL-MCNC: 1.19 MG/DL (ref 0.55–1.02)
ERYTHROCYTE [DISTWIDTH] IN BLOOD BY AUTOMATED COUNT: 12.6 % (ref 11.5–14.5)
GLOBULIN SER CALC-MCNC: 4.4 G/DL (ref 2–4)
GLUCOSE SERPL-MCNC: 100 MG/DL (ref 65–100)
HCT VFR BLD AUTO: 41 % (ref 35–47)
HDLC SERPL-MCNC: 52 MG/DL
HDLC SERPL: 4.2 (ref 0–5)
HGB BLD-MCNC: 12.6 G/DL (ref 11.5–16)
LDLC SERPL CALC-MCNC: 142.4 MG/DL (ref 0–100)
MCH RBC QN AUTO: 27.9 PG (ref 26–34)
MCHC RBC AUTO-ENTMCNC: 30.7 G/DL (ref 30–36.5)
MCV RBC AUTO: 90.9 FL (ref 80–99)
NRBC # BLD: 0 K/UL (ref 0–0.01)
NRBC BLD-RTO: 0 PER 100 WBC
PLATELET # BLD AUTO: 310 K/UL (ref 150–400)
PMV BLD AUTO: 10.6 FL (ref 8.9–12.9)
POTASSIUM SERPL-SCNC: 4.1 MMOL/L (ref 3.5–5.1)
PROT SERPL-MCNC: 8.3 G/DL (ref 6.4–8.2)
RBC # BLD AUTO: 4.51 M/UL (ref 3.8–5.2)
SODIUM SERPL-SCNC: 141 MMOL/L (ref 136–145)
TRIGL SERPL-MCNC: 123 MG/DL
VLDLC SERPL CALC-MCNC: 24.6 MG/DL
WBC # BLD AUTO: 6.6 K/UL (ref 3.6–11)

## 2024-01-08 PROCEDURE — 3075F SYST BP GE 130 - 139MM HG: CPT | Performed by: INTERNAL MEDICINE

## 2024-01-08 PROCEDURE — 99214 OFFICE O/P EST MOD 30 MIN: CPT | Performed by: INTERNAL MEDICINE

## 2024-01-08 PROCEDURE — 1123F ACP DISCUSS/DSCN MKR DOCD: CPT | Performed by: INTERNAL MEDICINE

## 2024-01-08 PROCEDURE — 3080F DIAST BP >= 90 MM HG: CPT | Performed by: INTERNAL MEDICINE

## 2024-01-08 RX ORDER — AMLODIPINE BESYLATE 10 MG/1
10 TABLET ORAL DAILY
Qty: 90 TABLET | Refills: 1 | Status: SHIPPED | OUTPATIENT
Start: 2024-01-08

## 2024-01-08 RX ORDER — LISINOPRIL 10 MG/1
10 TABLET ORAL DAILY
Qty: 90 TABLET | Refills: 1 | Status: SHIPPED | OUTPATIENT
Start: 2024-01-08

## 2024-01-08 RX ORDER — AMLODIPINE BESYLATE 5 MG/1
5 TABLET ORAL DAILY
Qty: 90 TABLET | Refills: 0 | Status: CANCELLED | OUTPATIENT
Start: 2024-01-08

## 2024-01-08 RX ORDER — KETOCONAZOLE 20 MG/ML
SHAMPOO TOPICAL
Qty: 100 ML | Refills: 1 | Status: SHIPPED | OUTPATIENT
Start: 2024-01-08

## 2024-01-08 RX ORDER — UBIDECARENONE 75 MG
50 CAPSULE ORAL DAILY
COMMUNITY

## 2024-01-08 RX ORDER — LISINOPRIL 10 MG/1
10 TABLET ORAL DAILY
Qty: 90 TABLET | Refills: 0 | Status: CANCELLED | OUTPATIENT
Start: 2024-01-08

## 2024-01-08 ASSESSMENT — PATIENT HEALTH QUESTIONNAIRE - PHQ9
SUM OF ALL RESPONSES TO PHQ QUESTIONS 1-9: 0
SUM OF ALL RESPONSES TO PHQ QUESTIONS 1-9: 0
1. LITTLE INTEREST OR PLEASURE IN DOING THINGS: 0
2. FEELING DOWN, DEPRESSED OR HOPELESS: 0
SUM OF ALL RESPONSES TO PHQ QUESTIONS 1-9: 0
SUM OF ALL RESPONSES TO PHQ QUESTIONS 1-9: 0
SUM OF ALL RESPONSES TO PHQ9 QUESTIONS 1 & 2: 0

## 2024-01-08 ASSESSMENT — ENCOUNTER SYMPTOMS
BACK PAIN: 0
COLOR CHANGE: 0
DIARRHEA: 0
COUGH: 0
ABDOMINAL PAIN: 0
CONSTIPATION: 0
SHORTNESS OF BREATH: 0
SORE THROAT: 0
RHINORRHEA: 0
CHEST TIGHTNESS: 0
EYE DISCHARGE: 0

## 2024-01-08 NOTE — PROGRESS NOTES
Health Decision Maker has been checked with the patient   Primary Decision Maker: Charlie Elliott - Spouse - [phone]    Secondary Decision Maker: Lena Ly - Child - [phone]    Secondary Decision Maker: Leonel Sims - Child - [phone]   Refused Flu shot in office   Patient has stated that the scribe can come in room    Chief Complaint   Patient presents with    Follow-up     Itching scalp reports to have eczema      Depression: Not at risk (6/9/2023)    PHQ-2     PHQ-2 Score: 0      BP (!) 136/90 (Site: Left Upper Arm)   Pulse 70   Temp 97.5 °F (36.4 °C)   Resp 16   Ht 1.422 m (4' 8\")   Wt 69.4 kg (153 lb)   SpO2 97%   BMI 34.30 kg/m²     \"Have you been to the ER, urgent care clinic since your last visit?  Hospitalized since your last visit?\"    NO    “Have you seen or consulted any other health care providers outside of [facility] since your last visit?”    NO             
  Transportation Needs: Unknown (6/9/2023)    PRAPARE - Transportation     Lack of Transportation (Medical): Not on file     Lack of Transportation (Non-Medical): No   Physical Activity: Not on file   Stress: Not on file   Social Connections: Not on file   Intimate Partner Violence: Not on file   Housing Stability: Unknown (6/9/2023)    Housing Stability Vital Sign     Unable to Pay for Housing in the Last Year: Not on file     Number of Places Lived in the Last Year: Not on file     Unstable Housing in the Last Year: No       No visits with results within 3 Month(s) from this visit.   Latest known visit with results is:   Telephone on 06/09/2023   Component Date Value Ref Range Status    Glucose 06/09/2023 104 (H)  70 - 99 mg/dL Final    BUN 06/09/2023 19  8 - 27 mg/dL Final    Creatinine 06/09/2023 1.24 (H)  0.57 - 1.00 mg/dL Final    Est, Glomerular Filtration Rate 06/09/2023 46 (L)  >59 mL/min/1.73 Final    BUN/Creatinine Ratio 06/09/2023 15  12 - 28 Final    Sodium 06/09/2023 140  134 - 144 mmol/L Final    Potassium 06/09/2023 4.2  3.5 - 5.2 mmol/L Final    Chloride 06/09/2023 103  96 - 106 mmol/L Final    CO2 06/09/2023 22  20 - 29 mmol/L Final    Calcium 06/09/2023 9.6  8.7 - 10.3 mg/dL Final    Total Protein 06/09/2023 7.6  6.0 - 8.5 g/dL Final    Albumin 06/09/2023 4.3  3.7 - 4.7 g/dL Final    Globulin, Total 06/09/2023 3.3  1.5 - 4.5 g/dL Final    Albumin/Globulin Ratio 06/09/2023 1.3  1.2 - 2.2 Final    Total Bilirubin 06/09/2023 0.6  0.0 - 1.2 mg/dL Final    Alkaline Phosphatase 06/09/2023 87  44 - 121 IU/L Final    AST 06/09/2023 17  0 - 40 IU/L Final    ALT 06/09/2023 14  0 - 32 IU/L Final         Review of Systems   Constitutional:  Negative for activity change, fatigue and unexpected weight change.   HENT:  Negative for congestion, hearing loss, rhinorrhea and sore throat.         Dandruff   Eyes:  Negative for discharge and visual disturbance.        Dry eyes (bilaterally)   Respiratory:  Negative

## 2024-01-11 ENCOUNTER — HOSPITAL ENCOUNTER (OUTPATIENT)
Facility: HOSPITAL | Age: 74
Discharge: HOME | End: 2024-01-11
Attending: INTERNAL MEDICINE
Payer: MEDICARE

## 2024-01-11 DIAGNOSIS — M85.89 OSTEOPENIA OF MULTIPLE SITES: ICD-10-CM

## 2024-01-11 DIAGNOSIS — M85.89 OSTEOPENIA OF MULTIPLE SITES: Primary | ICD-10-CM

## 2024-01-11 PROCEDURE — 77080 DXA BONE DENSITY AXIAL: CPT

## 2024-01-11 RX ORDER — ALENDRONATE SODIUM 70 MG/1
70 TABLET ORAL
Qty: 12 TABLET | Refills: 1 | Status: SHIPPED | OUTPATIENT
Start: 2024-01-11

## 2024-03-20 ENCOUNTER — TRANSCRIBE ORDERS (OUTPATIENT)
Facility: HOSPITAL | Age: 74
End: 2024-03-20

## 2024-03-20 DIAGNOSIS — Z12.31 ENCOUNTER FOR MAMMOGRAM TO ESTABLISH BASELINE MAMMOGRAM: Primary | ICD-10-CM

## 2024-03-25 ENCOUNTER — HOSPITAL ENCOUNTER (OUTPATIENT)
Facility: HOSPITAL | Age: 74
Discharge: HOME | End: 2024-03-28
Attending: INTERNAL MEDICINE
Payer: MEDICARE

## 2024-03-25 DIAGNOSIS — Z12.31 ENCOUNTER FOR MAMMOGRAM TO ESTABLISH BASELINE MAMMOGRAM: ICD-10-CM

## 2024-03-25 PROCEDURE — 77063 BREAST TOMOSYNTHESIS BI: CPT

## 2024-04-10 DIAGNOSIS — I10 ESSENTIAL HYPERTENSION: ICD-10-CM

## 2024-04-11 RX ORDER — LISINOPRIL 10 MG/1
10 TABLET ORAL DAILY
Qty: 90 TABLET | Refills: 0 | Status: SHIPPED | OUTPATIENT
Start: 2024-04-11

## 2024-04-11 RX ORDER — AMLODIPINE BESYLATE 10 MG/1
10 TABLET ORAL DAILY
Qty: 90 TABLET | Refills: 0 | Status: SHIPPED | OUTPATIENT
Start: 2024-04-11

## 2024-07-01 SDOH — ECONOMIC STABILITY: FOOD INSECURITY: WITHIN THE PAST 12 MONTHS, YOU WORRIED THAT YOUR FOOD WOULD RUN OUT BEFORE YOU GOT MONEY TO BUY MORE.: NEVER TRUE

## 2024-07-01 SDOH — ECONOMIC STABILITY: FOOD INSECURITY: WITHIN THE PAST 12 MONTHS, THE FOOD YOU BOUGHT JUST DIDN'T LAST AND YOU DIDN'T HAVE MONEY TO GET MORE.: NEVER TRUE

## 2024-07-01 SDOH — ECONOMIC STABILITY: INCOME INSECURITY: HOW HARD IS IT FOR YOU TO PAY FOR THE VERY BASICS LIKE FOOD, HOUSING, MEDICAL CARE, AND HEATING?: NOT VERY HARD

## 2024-07-01 SDOH — ECONOMIC STABILITY: TRANSPORTATION INSECURITY
IN THE PAST 12 MONTHS, HAS LACK OF TRANSPORTATION KEPT YOU FROM MEETINGS, WORK, OR FROM GETTING THINGS NEEDED FOR DAILY LIVING?: NO

## 2024-07-01 SDOH — HEALTH STABILITY: PHYSICAL HEALTH: ON AVERAGE, HOW MANY MINUTES DO YOU ENGAGE IN EXERCISE AT THIS LEVEL?: 0 MIN

## 2024-07-01 SDOH — HEALTH STABILITY: PHYSICAL HEALTH: ON AVERAGE, HOW MANY DAYS PER WEEK DO YOU ENGAGE IN MODERATE TO STRENUOUS EXERCISE (LIKE A BRISK WALK)?: 0 DAYS

## 2024-07-01 ASSESSMENT — PATIENT HEALTH QUESTIONNAIRE - PHQ9
2. FEELING DOWN, DEPRESSED OR HOPELESS: NOT AT ALL
SUM OF ALL RESPONSES TO PHQ QUESTIONS 1-9: 0
SUM OF ALL RESPONSES TO PHQ9 QUESTIONS 1 & 2: 0
1. LITTLE INTEREST OR PLEASURE IN DOING THINGS: NOT AT ALL

## 2024-07-01 ASSESSMENT — LIFESTYLE VARIABLES
HOW MANY STANDARD DRINKS CONTAINING ALCOHOL DO YOU HAVE ON A TYPICAL DAY: 0
HOW OFTEN DO YOU HAVE A DRINK CONTAINING ALCOHOL: 1
HOW OFTEN DO YOU HAVE SIX OR MORE DRINKS ON ONE OCCASION: 1
HOW OFTEN DO YOU HAVE A DRINK CONTAINING ALCOHOL: NEVER
HOW MANY STANDARD DRINKS CONTAINING ALCOHOL DO YOU HAVE ON A TYPICAL DAY: PATIENT DOES NOT DRINK

## 2024-07-02 ENCOUNTER — OFFICE VISIT (OUTPATIENT)
Dept: PRIMARY CARE CLINIC | Facility: CLINIC | Age: 74
End: 2024-07-02
Payer: MEDICARE

## 2024-07-02 VITALS
HEIGHT: 56 IN | WEIGHT: 148 LBS | BODY MASS INDEX: 33.29 KG/M2 | DIASTOLIC BLOOD PRESSURE: 76 MMHG | HEART RATE: 76 BPM | TEMPERATURE: 97.8 F | SYSTOLIC BLOOD PRESSURE: 126 MMHG | OXYGEN SATURATION: 99 % | RESPIRATION RATE: 16 BRPM

## 2024-07-02 DIAGNOSIS — I10 PRIMARY HYPERTENSION: ICD-10-CM

## 2024-07-02 DIAGNOSIS — E78.2 MIXED HYPERLIPIDEMIA: ICD-10-CM

## 2024-07-02 DIAGNOSIS — G47.33 OSA (OBSTRUCTIVE SLEEP APNEA): ICD-10-CM

## 2024-07-02 DIAGNOSIS — R79.89 ELEVATED SERUM CREATININE: ICD-10-CM

## 2024-07-02 DIAGNOSIS — M85.89 OSTEOPENIA OF MULTIPLE SITES: ICD-10-CM

## 2024-07-02 DIAGNOSIS — Z00.00 MEDICARE ANNUAL WELLNESS VISIT, SUBSEQUENT: Primary | ICD-10-CM

## 2024-07-02 PROCEDURE — 3074F SYST BP LT 130 MM HG: CPT | Performed by: INTERNAL MEDICINE

## 2024-07-02 PROCEDURE — 1123F ACP DISCUSS/DSCN MKR DOCD: CPT | Performed by: INTERNAL MEDICINE

## 2024-07-02 PROCEDURE — 3078F DIAST BP <80 MM HG: CPT | Performed by: INTERNAL MEDICINE

## 2024-07-02 PROCEDURE — 99214 OFFICE O/P EST MOD 30 MIN: CPT | Performed by: INTERNAL MEDICINE

## 2024-07-02 PROCEDURE — G0439 PPPS, SUBSEQ VISIT: HCPCS | Performed by: INTERNAL MEDICINE

## 2024-07-02 RX ORDER — LISINOPRIL 10 MG/1
10 TABLET ORAL DAILY
Qty: 90 TABLET | Refills: 0 | Status: SHIPPED | OUTPATIENT
Start: 2024-07-02

## 2024-07-02 RX ORDER — AMLODIPINE BESYLATE 10 MG/1
10 TABLET ORAL DAILY
Qty: 90 TABLET | Refills: 0 | Status: SHIPPED | OUTPATIENT
Start: 2024-07-02

## 2024-07-02 ASSESSMENT — ENCOUNTER SYMPTOMS
CONSTIPATION: 0
COLOR CHANGE: 0
RHINORRHEA: 0
ABDOMINAL PAIN: 0
BACK PAIN: 0
SORE THROAT: 0
SHORTNESS OF BREATH: 0
CHEST TIGHTNESS: 0
COUGH: 0
EYE DISCHARGE: 0
DIARRHEA: 0

## 2024-07-02 NOTE — PROGRESS NOTES
Medicare Annual Wellness Visit    Rosalinda Elliott is here for Medicare AWV (PT WOULD LIKE BETAMETHASONE DIPROPIONATE- CREAM- 0.05) and Medication Refill (BLOOD PRESSURE MED)    Assessment & Plan   Medicare annual wellness visit, subsequent  .Age appropriate Health screening and immunization discussed with patient.    Declined all the immunizations. Consequences discussed.      Subjective       Patient's complete Health Risk Assessment and screening values have been reviewed and are found in Flowsheets. The following problems were reviewed today and where indicated follow up appointments were made and/or referrals ordered.    Positive Risk Factor Screenings with Interventions:                Activity, Diet, and Weight:  On average, how many days per week do you engage in moderate to strenuous exercise (like a brisk walk)?: 0 days  On average, how many minutes do you engage in exercise at this level?: 0 min    Do you eat balanced/healthy meals regularly?: Yes    Body mass index is 33.18 kg/m². (!) Abnormal        Inactivity Interventions:  Recommendations: patient agrees to increase physical activity as follows: 30 minutes daily   Obesity Interventions:  low carbohydrate diet, increase physical activity as follows: 30 minutes walk daily                Vision Screen:  Do you have difficulty driving, watching TV, or doing any of your daily activities because of your eyesight?: No  Have you had an eye exam within the past year?: (!) No  No results found.    Interventions:   Patient encouraged to make appointment with their eye specialist      Advanced Directives:  Do you have a Living Will?: (!) No    Intervention:  has NO advanced directive - information provided                     Objective   Vitals:    07/02/24 1301   BP: 126/76   Site: Left Upper Arm   Position: Sitting   Pulse: 76   Resp: 16   Temp: 97.8 °F (36.6 °C)   TempSrc: Temporal   SpO2: 99%   Weight: 67.1 kg (148 lb)   Height: 1.422 m (4' 8\")      Body mass 
Patient provided with most updated VIS prior to administration. Opportunity given for questions and concerns provided. No concerns at this time    Immunizations administered to patient 7/2/2024 by Yuki Garrido MA with consent.Patient tolerated procedure well. No reactions noted.    Health Decision Maker has been checked with the patient   Primary Decision Maker: Charlie Elliott - Spouse - [phone]    Secondary Decision Maker: LeviLeonel - Child - [phone]    Secondary Decision Maker: Lena Ly - Child - [phone]         Chief Complaint   Patient presents with    Medicare AWV     PT WOULD LIKE BETAMETHASONE DIPROPIONATE- CREAM- 0.05       \"Have you been to the ER, urgent care clinic since your last visit?  Hospitalized since your last visit?\"    NO    “Have you seen or consulted any other health care providers outside of [facility] since your last visit?”    NO      There were no vitals filed for this visit.      PT GIVES PERMISSION FOR SCRIBE.          Click Here for Release of Records Request  
01/08/2024 12.6  11.5 - 16.0 g/dL Final    Hematocrit 01/08/2024 41.0  35.0 - 47.0 % Final    MCV 01/08/2024 90.9  80.0 - 99.0 FL Final    MCH 01/08/2024 27.9  26.0 - 34.0 PG Final    MCHC 01/08/2024 30.7  30.0 - 36.5 g/dL Final    RDW 01/08/2024 12.6  11.5 - 14.5 % Final    Platelets 01/08/2024 310  150 - 400 K/uL Final    MPV 01/08/2024 10.6  8.9 - 12.9 FL Final    Nucleated RBCs 01/08/2024 0.0  0  WBC Final    nRBC 01/08/2024 0.00  0.00 - 0.01 K/uL Final      Review of Systems   Constitutional:  Negative for activity change, fatigue and unexpected weight change.   HENT:  Negative for congestion, hearing loss, rhinorrhea and sore throat.    Eyes:  Negative for discharge.   Respiratory:  Negative for cough, chest tightness and shortness of breath.    Gastrointestinal:  Negative for abdominal pain, constipation and diarrhea.   Genitourinary:  Negative for dysuria, flank pain, frequency and urgency.   Musculoskeletal:  Negative for arthralgias, back pain and myalgias.   Skin:  Negative for color change and rash.   Neurological:  Negative for dizziness, light-headedness and headaches.   Psychiatric/Behavioral:  Negative for dysphoric mood and sleep disturbance. The patient is not nervous/anxious.        Objective     /76 (Site: Left Upper Arm, Position: Sitting)   Pulse 76   Temp 97.8 °F (36.6 °C) (Temporal)   Resp 16   Ht 1.422 m (4' 8\")   Wt 67.1 kg (148 lb)   SpO2 99%   BMI 33.18 kg/m²     Physical Exam  Vitals and nursing note reviewed.   Constitutional:       General: She is not in acute distress.     Appearance: Normal appearance. She is normal weight. She is not diaphoretic.   HENT:      Right Ear: External ear normal.      Left Ear: External ear normal.   Eyes:      General: No scleral icterus.        Right eye: No discharge.         Left eye: No discharge.      Extraocular Movements: Extraocular movements intact.      Conjunctiva/sclera: Conjunctivae normal.   Cardiovascular:      Rate and

## 2024-07-08 DIAGNOSIS — R79.89 ELEVATED SERUM CREATININE: ICD-10-CM

## 2024-07-08 DIAGNOSIS — I10 PRIMARY HYPERTENSION: ICD-10-CM

## 2024-07-08 DIAGNOSIS — E78.2 MIXED HYPERLIPIDEMIA: ICD-10-CM

## 2024-07-08 LAB
ALBUMIN SERPL-MCNC: 3.9 G/DL (ref 3.5–5)
ALBUMIN/GLOB SERPL: 1 (ref 1.1–2.2)
ALP SERPL-CCNC: 73 U/L (ref 45–117)
ALT SERPL-CCNC: 27 U/L (ref 12–78)
ANION GAP SERPL CALC-SCNC: 3 MMOL/L (ref 5–15)
AST SERPL-CCNC: 15 U/L (ref 15–37)
BILIRUB SERPL-MCNC: 0.8 MG/DL (ref 0.2–1)
BUN SERPL-MCNC: 14 MG/DL (ref 6–20)
BUN/CREAT SERPL: 12 (ref 12–20)
CALCIUM SERPL-MCNC: 9.6 MG/DL (ref 8.5–10.1)
CHLORIDE SERPL-SCNC: 107 MMOL/L (ref 97–108)
CHOLEST SERPL-MCNC: 211 MG/DL
CO2 SERPL-SCNC: 29 MMOL/L (ref 21–32)
CREAT SERPL-MCNC: 1.16 MG/DL (ref 0.55–1.02)
ERYTHROCYTE [DISTWIDTH] IN BLOOD BY AUTOMATED COUNT: 12.6 % (ref 11.5–14.5)
GLOBULIN SER CALC-MCNC: 4.1 G/DL (ref 2–4)
GLUCOSE SERPL-MCNC: 94 MG/DL (ref 65–100)
HCT VFR BLD AUTO: 38.2 % (ref 35–47)
HDLC SERPL-MCNC: 52 MG/DL
HDLC SERPL: 4.1 (ref 0–5)
HGB BLD-MCNC: 11.8 G/DL (ref 11.5–16)
LDLC SERPL CALC-MCNC: 133.4 MG/DL (ref 0–100)
MCH RBC QN AUTO: 28 PG (ref 26–34)
MCHC RBC AUTO-ENTMCNC: 30.9 G/DL (ref 30–36.5)
MCV RBC AUTO: 90.7 FL (ref 80–99)
NRBC # BLD: 0 K/UL (ref 0–0.01)
NRBC BLD-RTO: 0 PER 100 WBC
PLATELET # BLD AUTO: 280 K/UL (ref 150–400)
PMV BLD AUTO: 10.5 FL (ref 8.9–12.9)
POTASSIUM SERPL-SCNC: 3.9 MMOL/L (ref 3.5–5.1)
PROT SERPL-MCNC: 8 G/DL (ref 6.4–8.2)
RBC # BLD AUTO: 4.21 M/UL (ref 3.8–5.2)
SODIUM SERPL-SCNC: 139 MMOL/L (ref 136–145)
TRIGL SERPL-MCNC: 128 MG/DL
VLDLC SERPL CALC-MCNC: 25.6 MG/DL
WBC # BLD AUTO: 5.4 K/UL (ref 3.6–11)

## 2024-09-25 ENCOUNTER — OFFICE VISIT (OUTPATIENT)
Dept: PRIMARY CARE CLINIC | Facility: CLINIC | Age: 74
End: 2024-09-25
Payer: MEDICARE

## 2024-09-25 VITALS
HEIGHT: 56 IN | SYSTOLIC BLOOD PRESSURE: 131 MMHG | WEIGHT: 150.2 LBS | BODY MASS INDEX: 33.79 KG/M2 | DIASTOLIC BLOOD PRESSURE: 80 MMHG | HEART RATE: 71 BPM | RESPIRATION RATE: 17 BRPM | OXYGEN SATURATION: 100 % | TEMPERATURE: 97.2 F

## 2024-09-25 DIAGNOSIS — M75.01 ADHESIVE CAPSULITIS OF RIGHT SHOULDER: Primary | ICD-10-CM

## 2024-09-25 DIAGNOSIS — I10 ESSENTIAL HYPERTENSION: ICD-10-CM

## 2024-09-25 PROCEDURE — 3079F DIAST BP 80-89 MM HG: CPT | Performed by: INTERNAL MEDICINE

## 2024-09-25 PROCEDURE — 1123F ACP DISCUSS/DSCN MKR DOCD: CPT | Performed by: INTERNAL MEDICINE

## 2024-09-25 PROCEDURE — 3075F SYST BP GE 130 - 139MM HG: CPT | Performed by: INTERNAL MEDICINE

## 2024-09-25 PROCEDURE — 99214 OFFICE O/P EST MOD 30 MIN: CPT | Performed by: INTERNAL MEDICINE

## 2024-09-25 RX ORDER — PREDNISONE 20 MG/1
20 TABLET ORAL DAILY
Qty: 12 TABLET | Refills: 0 | Status: SHIPPED | OUTPATIENT
Start: 2024-09-25 | End: 2024-10-07

## 2024-09-25 RX ORDER — CALCIUM CARBONATE 500(1250)
500 TABLET ORAL DAILY
COMMUNITY

## 2024-09-25 ASSESSMENT — PATIENT HEALTH QUESTIONNAIRE - PHQ9
1. LITTLE INTEREST OR PLEASURE IN DOING THINGS: NOT AT ALL
2. FEELING DOWN, DEPRESSED OR HOPELESS: NOT AT ALL
SUM OF ALL RESPONSES TO PHQ QUESTIONS 1-9: 0
SUM OF ALL RESPONSES TO PHQ9 QUESTIONS 1 & 2: 0

## 2024-10-03 ENCOUNTER — APPOINTMENT (OUTPATIENT)
Dept: PHYSICAL THERAPY | Facility: HOSPITAL | Age: 74
End: 2024-10-03
Attending: INTERNAL MEDICINE
Payer: MEDICARE

## 2024-10-08 DIAGNOSIS — I10 PRIMARY HYPERTENSION: ICD-10-CM

## 2024-10-09 RX ORDER — AMLODIPINE BESYLATE 10 MG/1
10 TABLET ORAL DAILY
Qty: 90 TABLET | Refills: 0 | Status: SHIPPED | OUTPATIENT
Start: 2024-10-09

## 2024-10-09 RX ORDER — LISINOPRIL 10 MG/1
10 TABLET ORAL DAILY
Qty: 90 TABLET | Refills: 0 | Status: SHIPPED | OUTPATIENT
Start: 2024-10-09

## 2024-10-11 ENCOUNTER — HOSPITAL ENCOUNTER (OUTPATIENT)
Dept: PHYSICAL THERAPY | Facility: HOSPITAL | Age: 74
Setting detail: RECURRING SERIES
Discharge: HOME | End: 2024-10-14
Attending: INTERNAL MEDICINE
Payer: MEDICARE

## 2024-10-11 PROCEDURE — 97161 PT EVAL LOW COMPLEX 20 MIN: CPT

## 2024-10-11 NOTE — THERAPY EVALUATION
Physical Therapy at [facility],   a part of [address], [address]  [place] [address]  Phone: [phone]  Fax: [phone]           PHYSICAL THERAPY - MEDICARE EVALUATION/PLAN OF CARE NOTE (updated 3/23)      Date: 10/11/2024          Patient Name:  Rosalinda Elliott :  1950   Medical   Diagnosis:  Adhesive capsulitis of right shoulder [M75.01] Treatment Diagnosis:  M25.511  RIGHT SHOULDER PAIN    Referral Source:  Lizette Victor MD Provider #:  [id number]                Insurance: [facility] / Plan: [facility] COMPLETE HMO / Product Type: *No Product type* /      Patient  verified yes     Visit #   Current  / Total 1 Auth req   Time   In / Out 10:48A 11:45A   Total Treatment Time 57   Total Timed Codes 7   1:1 Treatment Time 7      [facility] Totals Reminder:  bill using total billable   min of TIMED therapeutic procedures and modalities.   8-22 min = 1 unit; 23-37 min = 2 units; 38-52 min = 3 units;  53-67 min = 4 units; 68-82 min = 5 units           SUBJECTIVE  Pain Level (0-10 scale): 5/10  []constant []intermittent []improving []worsening []no change since onset    Any medication changes, allergies to medications, adverse drug reactions, diagnosis change, or new procedure performed?: [x] No    [] Yes (see summary sheet for update)  Medications: Verified on Patient Summary List    Subjective functional status/changes:     The pt presents with cc of  right Shoulder pain. The pt reports taht pain started over a month ago for no reason. She is unable to lift her arm.   She reports that it is not as bad as it was. Problem laying on it. The pain hits mostly at night when she is laying down.     Start of Care: 10/11/2024  Onset Date: 1 month  Current symptoms/Complaints: pain right shoulder with moving and lifting  Mechanism of Injury: unknown  PLOF: cleaning, cooking lifting furniture.   Limitations to PLOF/Activity or

## 2024-10-14 ENCOUNTER — APPOINTMENT (OUTPATIENT)
Dept: PHYSICAL THERAPY | Facility: HOSPITAL | Age: 74
End: 2024-10-14
Attending: INTERNAL MEDICINE
Payer: MEDICARE

## 2024-10-18 ENCOUNTER — APPOINTMENT (OUTPATIENT)
Dept: PHYSICAL THERAPY | Facility: HOSPITAL | Age: 74
End: 2024-10-18
Attending: INTERNAL MEDICINE
Payer: MEDICARE

## 2024-10-23 ENCOUNTER — HOSPITAL ENCOUNTER (OUTPATIENT)
Dept: PHYSICAL THERAPY | Facility: HOSPITAL | Age: 74
Setting detail: RECURRING SERIES
Discharge: HOME | End: 2024-10-26
Attending: INTERNAL MEDICINE
Payer: MEDICARE

## 2024-10-23 PROCEDURE — 97110 THERAPEUTIC EXERCISES: CPT

## 2024-10-23 PROCEDURE — 97112 NEUROMUSCULAR REEDUCATION: CPT

## 2024-10-23 PROCEDURE — 97140 MANUAL THERAPY 1/> REGIONS: CPT

## 2024-10-23 NOTE — PROGRESS NOTES
PHYSICAL THERAPY - MEDICARE DAILY TREATMENT NOTE (updated 3/23)      Date: 10/23/2024          Patient Name:  Rosalinda Elliott :  1950   Medical   Diagnosis:  Adhesive capsulitis of right shoulder [M75.01] Treatment Diagnosis:  M25.511  RIGHT SHOULDER PAIN    Referral Source:  Lizette Victor MD Insurance:   Payor: [facility] MEDIC[employer] / Plan: [facility] COMPLETE HMO / Product Type: *No Product type* /                     Patient  verified yes     Visit #   Current  / Total 2 20   Time   In / Out 9:18A 10:00   Total Treatment Time 42   Total Timed Codes 42   1:1 Treatment Time 38      [facility] Totals Reminder:  bill using total billable   min of TIMED therapeutic procedures and modalities.   8-22 min = 1 unit; 23-37 min = 2 units; 38-52 min = 3 units; 53-67 min = 4 units; 68-82 min = 5 units            SUBJECTIVE    Pain Level (0-10 scale): 0/10    Any medication changes, allergies to medications, adverse drug reactions, diagnosis change, or new procedure performed?: [x] No    [] Yes (see summary sheet for update)  Medications: Verified on Patient Summary List    Subjective functional status/changes:     Pt reported some pain in shoulder last night she tried to raise her arm out to the side and had some pain. HEP has been helping.     OBJECTIVE      Therapeutic Procedures:  Tx Min Billable or 1:1 Min (if diff from Tx Min) Procedure, Rationale, Specifics   17 12 46774 Therapeutic Exercise (timed):  increase ROM, strength, coordination, balance, and proprioception to improve patient's ability to progress to PLOF and address remaining functional goals. (see flow sheet as applicable)     Details if applicable:  PROM R shoulder flexion and ER   10 10 07208 Neuromuscular Re-Education (timed):  improve balance, coordination, kinesthetic sense, posture, core stability and proprioception to improve patient's ability to develop conscious control of individual muscles and awareness of position of extremities in

## 2024-10-25 ENCOUNTER — HOSPITAL ENCOUNTER (OUTPATIENT)
Dept: PHYSICAL THERAPY | Facility: HOSPITAL | Age: 74
Setting detail: RECURRING SERIES
Discharge: HOME | End: 2024-10-28
Attending: INTERNAL MEDICINE
Payer: MEDICARE

## 2024-10-25 PROCEDURE — 97112 NEUROMUSCULAR REEDUCATION: CPT

## 2024-10-25 PROCEDURE — 97140 MANUAL THERAPY 1/> REGIONS: CPT

## 2024-10-25 PROCEDURE — 97110 THERAPEUTIC EXERCISES: CPT

## 2024-10-25 NOTE — PROGRESS NOTES
PHYSICAL THERAPY - MEDICARE DAILY TREATMENT NOTE (updated 3/23)      Date: 10/25/2024          Patient Name:  Rosalinda Elliott :  1950   Medical   Diagnosis:  Adhesive capsulitis of right shoulder [M75.01] Treatment Diagnosis:  M25.511  RIGHT SHOULDER PAIN    Referral Source:  Lizette Victor MD Insurance:   Payor: [facility] [employer] / Plan: [facility] COMPLETE HMO / Product Type: *No Product type* /                     Patient  verified yes     Visit #   Current  / Total 3 20   Time   In / Out 8:35A 9:30A   Total Treatment Time 55   Total Timed Codes 45   1:1 Treatment Time 40      [facility] Totals Reminder:  bill using total billable   min of TIMED therapeutic procedures and modalities.   8-22 min = 1 unit; 23-37 min = 2 units; 38-52 min = 3 units; 53-67 min = 4 units; 68-82 min = 5 units            SUBJECTIVE    Pain Level (0-10 scale): 0/10    Any medication changes, allergies to medications, adverse drug reactions, diagnosis change, or new procedure performed?: [x] No    [] Yes (see summary sheet for update)  Medications: Verified on Patient Summary List    Subjective functional status/changes:     Pt reported some pain in shoulder last night.    OBJECTIVE      Therapeutic Procedures:  Tx Min Billable or 1:1 Min (if diff from Tx Min) Procedure, Rationale, Specifics   20 15 26050 Therapeutic Exercise (timed):  increase ROM, strength, coordination, balance, and proprioception to improve patient's ability to progress to PLOF and address remaining functional goals. (see flow sheet as applicable)     Details if applicable:  PROM R shoulder flexion and ER   10 10 88929 Neuromuscular Re-Education (timed):  improve balance, coordination, kinesthetic sense, posture, core stability and proprioception to improve patient's ability to develop conscious control of individual muscles and awareness of position of extremities in order to progress to PLOF and address remaining functional goals. (see flow sheet

## 2024-10-30 ENCOUNTER — HOSPITAL ENCOUNTER (OUTPATIENT)
Dept: PHYSICAL THERAPY | Facility: HOSPITAL | Age: 74
Setting detail: RECURRING SERIES
Discharge: HOME | End: 2024-11-02
Attending: INTERNAL MEDICINE
Payer: MEDICARE

## 2024-10-30 PROCEDURE — 97112 NEUROMUSCULAR REEDUCATION: CPT

## 2024-10-30 PROCEDURE — 97140 MANUAL THERAPY 1/> REGIONS: CPT

## 2024-10-30 NOTE — PROGRESS NOTES
of session (0-10 scale): 0/10      Assessment   Pt tolerated session well.  No pain noted with exercises. Able to add TB extension and open books today. ROM is improving.    Patient will continue to benefit from skilled PT / OT services to modify and progress therapeutic interventions, analyze and address functional mobility deficits, analyze and address ROM deficits, analyze and address strength deficits, analyze and address soft tissue restrictions, analyze and cue for proper movement patterns, analyze and modify for postural abnormalities, and instruct in home and community integration to address functional deficits and attain remaining goals.    Progress toward goals / Updated goals:  []  See Progress Note/Recertification    Short Term Goals: To be accomplished in 10 treatments.  The patient will be independent with introductory HEP with no v.c. or tactile cuing by PT needed   The patient will improve right shoulder flexion AROM to 155 deg to improve ease with overhead reaching tasks  The patient will demonstrate pain free right shoulder IR AROM T12 to allow for doning and doffing of clothing without assistance needed       Long Term Goals: To be accomplished in 20 treatments.  The patient will demonstrate pain free shoulder AROM multidirectionally to improve ease with household chores such as dishes, laundry and cleaning tasks  The patient will report ability to unload groceries from car into home without an increase in pain within the last 5 days  The patient will demonstrate a minimum of 4+/5 strength of right shoulder to allow for UE endurance for fulling loading and transferring clothing from washer and dryer without rest breaks and without an increase in pain  The patient will demonstrate ability to lift 2 pounds overhead with pain level of 1/10 or less to improve ease of putting away dishes in kitchen      Frequency / Duration: Patient to be seen 2 times per week for 20 treatments.      PLAN  Yes

## 2024-11-01 ENCOUNTER — HOSPITAL ENCOUNTER (OUTPATIENT)
Dept: PHYSICAL THERAPY | Facility: HOSPITAL | Age: 74
Setting detail: RECURRING SERIES
Discharge: HOME | End: 2024-11-04
Attending: INTERNAL MEDICINE
Payer: MEDICARE

## 2024-11-01 PROCEDURE — 97110 THERAPEUTIC EXERCISES: CPT

## 2024-11-01 PROCEDURE — 97112 NEUROMUSCULAR REEDUCATION: CPT

## 2024-11-01 PROCEDURE — 97140 MANUAL THERAPY 1/> REGIONS: CPT

## 2024-11-01 NOTE — PROGRESS NOTES
applicable)     Details if applicable:  S/L ER B ER with emphasis on proper scapular engagement and avoidance of UT compensation   15 [phone] [id number] Manual Therapy (timed):  decrease pain, increase ROM, increase tissue extensibility, decrease trigger points, and increase postural awareness to improve patient's ability to progress to PLOF and address remaining functional goals.  The manual therapy interventions were performed at a separate and distinct time from the therapeutic activities interventions . (see flow sheet as applicable)    Details if applicable: UT STM,R pec release, Gd II/III R GHJ inferior and Superior mobs,               43 38    Total Total     Modalities Rationale:     decrease pain and increase tissue extensibility to improve patient's ability to progress to PLOF and address remaining functional goals.       min [] Estim Unattended,             type/location:       []  w/ice    []  w/heat        min [] Estim Attended,             type/location:       []  w/ice   []  w/heat         []  w/US   []  TENS insruct            min []  Mechanical Traction,        type/lbs:        []  pro      []  sup           []  int       []  cont            []  before manual           []  after manual     min []  Ultrasound,         settings/location:     10 min  unbilled []  Ice     [x]  Heat            location/position:         min []  Vasopneumatic Device,      press/temp:   pre-treatment girth :    post-treatment girth :    measured at (landmark       location) :   If using vaso (only need to measure limb vaso being performed on)        min []  Other:        Skin assessment post-treatment (if applicable):    [x]  intact    []  redness- no adverse reaction                 []redness - adverse reaction:              [x]  Patient Education billed concurrently with other procedures   [x] Review HEP    [] Progressed/Changed HEP, detail:    [] Other detail:         Other Objective/Functional Measures  --    Pain Level at end

## 2024-11-06 ENCOUNTER — HOSPITAL ENCOUNTER (OUTPATIENT)
Dept: PHYSICAL THERAPY | Facility: HOSPITAL | Age: 74
Setting detail: RECURRING SERIES
Discharge: HOME | End: 2024-11-09
Attending: INTERNAL MEDICINE
Payer: MEDICARE

## 2024-11-06 PROCEDURE — 97112 NEUROMUSCULAR REEDUCATION: CPT

## 2024-11-06 PROCEDURE — 97110 THERAPEUTIC EXERCISES: CPT

## 2024-11-06 PROCEDURE — 97140 MANUAL THERAPY 1/> REGIONS: CPT

## 2024-11-06 NOTE — PROGRESS NOTES
PHYSICAL THERAPY - MEDICARE DAILY TREATMENT NOTE (updated 3/23)      Date: 2024          Patient Name:  Rosalinda Elliott :  1950   Medical   Diagnosis:  Adhesive capsulitis of right shoulder [M75.01] Treatment Diagnosis:  M25.511  RIGHT SHOULDER PAIN    Referral Source:  Lizette Victor MD Insurance:   Payor: [employer] / Plan: [facility] COMPLETE HMO / Product Type: *No Product type* /                     Patient  verified yes     Visit #   Current  / Total 6 20   Time   In / Out 10:42A 11:45A   Total Treatment Time 63   Total Timed Codes 53   1:1 Treatment Time 45      [facility] Totals Reminder:  bill using total billable   min of TIMED therapeutic procedures and modalities.   8-22 min = 1 unit; 23-37 min = 2 units; 38-52 min = 3 units; 53-67 min = 4 units; 68-82 min = 5 units            SUBJECTIVE    Pain Level (0-10 scale): 2-3/10    Any medication changes, allergies to medications, adverse drug reactions, diagnosis change, or new procedure performed?: [x] No    [] Yes (see summary sheet for update)  Medications: Verified on Patient Summary List    Subjective functional status/changes:     Pt reported that her shoulder woke her up last night with a lot of pain and she is still painful this morning. There is not specific spot of pain just more global. Did not have increase in activities      OBJECTIVE      Therapeutic Procedures:  Tx Min Billable or 1:1 Min (if diff from Tx Min) Procedure, Rationale, Specifics   23 15 [id number] Therapeutic Exercise (timed):  increase ROM, strength, coordination, balance, and proprioception to improve patient's ability to progress to PLOF and address remaining functional goals. (see flow sheet as applicable)     Details if applicable:  PROM R shoulder flexion and ER   15 15 [id number] Neuromuscular Re-Education (timed):  improve balance, coordination, kinesthetic sense, posture, core stability and proprioception to improve patient's ability to develop

## 2024-11-08 ENCOUNTER — HOSPITAL ENCOUNTER (OUTPATIENT)
Dept: PHYSICAL THERAPY | Facility: HOSPITAL | Age: 74
Setting detail: RECURRING SERIES
Discharge: HOME | End: 2024-11-11
Attending: INTERNAL MEDICINE
Payer: MEDICARE

## 2024-11-08 PROCEDURE — 97140 MANUAL THERAPY 1/> REGIONS: CPT

## 2024-11-08 PROCEDURE — 97110 THERAPEUTIC EXERCISES: CPT

## 2024-11-08 PROCEDURE — 97112 NEUROMUSCULAR REEDUCATION: CPT

## 2024-11-08 NOTE — PROGRESS NOTES
Objective/Functional Measures  --    Pain Level at end of session (0-10 scale): 0/10      Assessment   Pt advised to try heat prior to going to bed and utilizing it in the middle of the night if she wakes up in the middle of the night to ease pain. If symptoms persist or get worse pt advised to reach out to MD.  Patient will continue to benefit from skilled PT / OT services to modify and progress therapeutic interventions, analyze and address functional mobility deficits, analyze and address ROM deficits, analyze and address strength deficits, analyze and address soft tissue restrictions, analyze and cue for proper movement patterns, analyze and modify for postural abnormalities, and instruct in home and community integration to address functional deficits and attain remaining goals.    Progress toward goals / Updated goals:  []  See Progress Note/Recertification    Short Term Goals: To be accomplished in 10 treatments.  The patient will be independent with introductory HEP with no v.c. or tactile cuing by PT needed   The patient will improve right shoulder flexion AROM to 155 deg to improve ease with overhead reaching tasks  The patient will demonstrate pain free right shoulder IR AROM T12 to allow for doning and doffing of clothing without assistance needed       Long Term Goals: To be accomplished in 20 treatments.  The patient will demonstrate pain free shoulder AROM multidirectionally to improve ease with household chores such as dishes, laundry and cleaning tasks  The patient will report ability to unload groceries from car into home without an increase in pain within the last 5 days  The patient will demonstrate a minimum of 4+/5 strength of right shoulder to allow for UE endurance for fulling loading and transferring clothing from washer and dryer without rest breaks and without an increase in pain  The patient will demonstrate ability to lift 2 pounds overhead with pain level of 1/10 or less to improve ease

## 2024-11-11 ENCOUNTER — HOSPITAL ENCOUNTER (OUTPATIENT)
Dept: PHYSICAL THERAPY | Facility: HOSPITAL | Age: 74
Setting detail: RECURRING SERIES
Discharge: HOME | End: 2024-11-14
Attending: INTERNAL MEDICINE
Payer: MEDICARE

## 2024-11-11 ENCOUNTER — TELEPHONE (OUTPATIENT)
Dept: PRIMARY CARE CLINIC | Facility: CLINIC | Age: 74
End: 2024-11-11

## 2024-11-11 DIAGNOSIS — G89.29 CHRONIC RIGHT SHOULDER PAIN: Primary | ICD-10-CM

## 2024-11-11 DIAGNOSIS — M25.511 CHRONIC RIGHT SHOULDER PAIN: Primary | ICD-10-CM

## 2024-11-11 PROCEDURE — 97110 THERAPEUTIC EXERCISES: CPT

## 2024-11-11 PROCEDURE — 97112 NEUROMUSCULAR REEDUCATION: CPT

## 2024-11-11 NOTE — TELEPHONE ENCOUNTER
Patient calling wanting a referral for orthopedics for steroid shot  because physical therapy is not helping patient stated.

## 2024-11-11 NOTE — TELEPHONE ENCOUNTER
Called the patient, spoke with the patient she has stated she does not think PT is helping and that she wants to do the steroid injection.  Patient reports that she is still doing PT and that the notes are on file.   Patient is asking for a referral

## 2024-11-13 ENCOUNTER — APPOINTMENT (OUTPATIENT)
Dept: PHYSICAL THERAPY | Facility: HOSPITAL | Age: 74
End: 2024-11-13
Attending: INTERNAL MEDICINE
Payer: MEDICARE

## 2024-11-15 NOTE — PROGRESS NOTES
Physical Therapy at [facility],   a part of [address], [address]  [place] [address]  Phone: [phone]  Fax: [phone]      PHYSICAL THERAPY PROGRESS NOTE  Patient Name:  Rosalinda Elliott :  1950   Treatment/Medical Diagnosis: Adhesive capsulitis of right shoulder [M75.01]   Referral Source:  Lizette Victor MD     Date of Initial Visit:  10/11/24 Attended Visits:  8 Missed Visits:  0     SUMMARY OF TREATMENT/ASSESSMENT:  Pt has been seen for 8 sessions s/p R shoulder pain. Treatment has inlcuded therapuetic exercises, neuromuscular reeducation, manual therapy, modalities, HEP and pt education.     CURRENT STATUS/GOALS:    Pt has demonstrated increase in strength since starting PT but continues to report increase in pain at night limiting her sleep. Pt is planning to report back to MD for a FU and will hold on PT until then.    () Indicates on Initial Evaluation     FOTO 48 (66)     Right Shoulder AROM:         Rt                                              Flexion                      170 p! On retrun motion (145)                                            Abduction                 115 p!   (128)                                           IR                             T12 p!   (Hand to L3)                                ER                            T1 p!   (Hand to T3)                          UPPER QUARTER                             MUSCLE STRENGTH  KEY                                                                             R                       0 - No Contraction                               Flexion           4+ p! (4)                       1 - Trace                                              Extension       4+ (4)                        2 - Poor                                               Abduction      4+ p! (4)                        3 - Fair                                                 IR                  4+ (5)

## 2024-11-18 ENCOUNTER — APPOINTMENT (OUTPATIENT)
Dept: PHYSICAL THERAPY | Facility: HOSPITAL | Age: 74
End: 2024-11-18
Attending: INTERNAL MEDICINE
Payer: MEDICARE

## 2024-12-11 ENCOUNTER — OFFICE VISIT (OUTPATIENT)
Dept: PRIMARY CARE CLINIC | Facility: CLINIC | Age: 74
End: 2024-12-11
Payer: MEDICARE

## 2024-12-11 VITALS
RESPIRATION RATE: 16 BRPM | OXYGEN SATURATION: 97 % | BODY MASS INDEX: 34.19 KG/M2 | WEIGHT: 152 LBS | SYSTOLIC BLOOD PRESSURE: 119 MMHG | DIASTOLIC BLOOD PRESSURE: 74 MMHG | HEART RATE: 84 BPM | HEIGHT: 56 IN | TEMPERATURE: 97.3 F

## 2024-12-11 DIAGNOSIS — I10 PRIMARY HYPERTENSION: ICD-10-CM

## 2024-12-11 DIAGNOSIS — M75.111 INCOMPLETE ROTATR-CUFF TEAR/RUPTR OF R SHOULDER, NOT TRAUMA: Primary | ICD-10-CM

## 2024-12-11 DIAGNOSIS — Z01.818 PRE-OP EVALUATION: ICD-10-CM

## 2024-12-11 DIAGNOSIS — R79.89 ELEVATED SERUM CREATININE: ICD-10-CM

## 2024-12-11 DIAGNOSIS — K59.09 OTHER CONSTIPATION: ICD-10-CM

## 2024-12-11 PROCEDURE — 3078F DIAST BP <80 MM HG: CPT | Performed by: INTERNAL MEDICINE

## 2024-12-11 PROCEDURE — 1126F AMNT PAIN NOTED NONE PRSNT: CPT | Performed by: INTERNAL MEDICINE

## 2024-12-11 PROCEDURE — 3074F SYST BP LT 130 MM HG: CPT | Performed by: INTERNAL MEDICINE

## 2024-12-11 PROCEDURE — 99214 OFFICE O/P EST MOD 30 MIN: CPT | Performed by: INTERNAL MEDICINE

## 2024-12-11 PROCEDURE — 93000 ELECTROCARDIOGRAM COMPLETE: CPT | Performed by: INTERNAL MEDICINE

## 2024-12-11 PROCEDURE — 1160F RVW MEDS BY RX/DR IN RCRD: CPT | Performed by: INTERNAL MEDICINE

## 2024-12-11 PROCEDURE — 1123F ACP DISCUSS/DSCN MKR DOCD: CPT | Performed by: INTERNAL MEDICINE

## 2024-12-11 PROCEDURE — 1159F MED LIST DOCD IN RCRD: CPT | Performed by: INTERNAL MEDICINE

## 2024-12-11 RX ORDER — SENNA AND DOCUSATE SODIUM 50; 8.6 MG/1; MG/1
2 TABLET, FILM COATED ORAL DAILY
Qty: 60 TABLET | Refills: 0 | Status: SHIPPED | OUTPATIENT
Start: 2024-12-11 | End: 2025-01-10

## 2024-12-11 NOTE — PROGRESS NOTES
Health Decision Maker has been checked with the patient   Primary Decision Maker: Charile Elliott - Spouse - [phone]    Secondary Decision Maker: Leonel Sims - Child - [phone]    Secondary Decision Maker: Lena Ly - Child - [phone]     Patient has stated that the scribe can come in room    Chief Complaint   Patient presents with    Pre-op Exam     Shoulder surgery unknown date. Dr Zepeda       \"Have you been to the ER, urgent care clinic since your last visit?  Hospitalized since your last visit?\"    NO    “Have you seen or consulted any other health care providers outside of [facility] since your last visit?”    YES - When: approximately 1  weeks ago.  Where and Why: Ortho.      Vitals:    12/11/24 0952   BP: 119/74   Site: Left Upper Arm   Pulse: 84   Resp: 16   Temp: 97.3 °F (36.3 °C)   SpO2: 97%   Weight: 68.9 kg (152 lb)   Height: 1.422 m (4' 8\")      Depression: Not at risk (9/25/2024)    PHQ-2     PHQ-2 Score: 0              Click Here for Release of Records Request    Specialist patient sees: ortho    Chart reviewed: immunizations are documented.   Immunization History   Administered Date(s) Administered    COVID-19, PFIZER PURPLE top, DILUTE for use, (age 12 y+), 30mcg/0.3mL 04/12/2021, 05/03/2021, 12/10/2021      
Sister     Anesth Problems Neg Hx      Social History     Socioeconomic History    Marital status:      Spouse name: None    Number of children: None    Years of education: None    Highest education level: None   Tobacco Use    Smoking status: Never    Smokeless tobacco: Never   Vaping Use    Vaping status: Never Used   Substance and Sexual Activity    Alcohol use: No    Drug use: No    Sexual activity: Yes     Partners: Male     Comment: My      Social Determinants of Health     Financial Resource Strain: Low Risk  (6/9/2023)    Overall Financial Resource Strain (CARDIA)     Difficulty of Paying Living Expenses: Not hard at all   Transportation Needs: Unknown (6/9/2023)    PRAPARE - Transportation     Lack of Transportation (Non-Medical): No   Physical Activity: Inactive (7/1/2024)    Exercise Vital Sign     Days of Exercise per Week: 0 days     Minutes of Exercise per Session: 0 min   Housing Stability: Unknown (6/9/2023)    Housing Stability Vital Sign     Unstable Housing in the Last Year: No     Current Outpatient Medications   Medication Sig Dispense Refill    sennosides-docusate sodium (SENOKOT-S) 8.6-50 MG tablet Take 2 tablets by mouth daily 60 tablet 0    ibuprofen (ADVIL;MOTRIN) 800 MG tablet Take 1 tablet by mouth every 8 hours as needed for Pain 90 tablet 3    cyclobenzaprine (FLEXERIL) 10 MG tablet Take 1 tablet by mouth 3 times daily as needed for Muscle spasms 30 tablet 0    amLODIPine (NORVASC) 10 MG tablet Take 1 tablet by mouth daily 90 tablet 0    lisinopril (PRINIVIL;ZESTRIL) 10 MG tablet Take 1 tablet by mouth daily 90 tablet 0    calcium carbonate (OSCAL) 500 MG TABS tablet Take 1 tablet by mouth daily      vitamin D 25 MCG (1000 UT) CAPS Take by mouth      vitamin B-12 (CYANOCOBALAMIN) 100 MCG tablet Take 0.5 tablets by mouth daily      ketoconazole (NIZORAL) 2 % shampoo Apply topically daily as needed. 100 mL 1     No current facility-administered medications for this visit.

## 2024-12-12 LAB
ALBUMIN SERPL-MCNC: 4.1 G/DL (ref 3.5–5)
ALBUMIN/GLOB SERPL: 1 (ref 1.1–2.2)
ALP SERPL-CCNC: 76 U/L (ref 45–117)
ALT SERPL-CCNC: 17 U/L (ref 12–78)
ANION GAP SERPL CALC-SCNC: 6 MMOL/L (ref 2–12)
APPEARANCE UR: CLEAR
AST SERPL-CCNC: 15 U/L (ref 15–37)
BACTERIA URNS QL MICRO: NEGATIVE /HPF
BILIRUB SERPL-MCNC: 0.9 MG/DL (ref 0.2–1)
BILIRUB UR QL: NEGATIVE
BUN SERPL-MCNC: 12 MG/DL (ref 6–20)
BUN/CREAT SERPL: 9 (ref 12–20)
CALCIUM SERPL-MCNC: 10 MG/DL (ref 8.5–10.1)
CHLORIDE SERPL-SCNC: 107 MMOL/L (ref 97–108)
CO2 SERPL-SCNC: 26 MMOL/L (ref 21–32)
COLOR UR: ABNORMAL
CREAT SERPL-MCNC: 1.27 MG/DL (ref 0.55–1.02)
EPITH CASTS URNS QL MICRO: ABNORMAL /LPF
GLOBULIN SER CALC-MCNC: 4 G/DL (ref 2–4)
GLUCOSE SERPL-MCNC: 99 MG/DL (ref 65–100)
GLUCOSE UR STRIP.AUTO-MCNC: NEGATIVE MG/DL
HGB UR QL STRIP: NEGATIVE
HYALINE CASTS URNS QL MICRO: ABNORMAL /LPF (ref 0–5)
KETONES UR QL STRIP.AUTO: NEGATIVE MG/DL
LEUKOCYTE ESTERASE UR QL STRIP.AUTO: ABNORMAL
NITRITE UR QL STRIP.AUTO: NEGATIVE
PH UR STRIP: 5.5 (ref 5–8)
POTASSIUM SERPL-SCNC: 3.9 MMOL/L (ref 3.5–5.1)
PROT SERPL-MCNC: 8.1 G/DL (ref 6.4–8.2)
PROT UR STRIP-MCNC: NEGATIVE MG/DL
RBC #/AREA URNS HPF: ABNORMAL /HPF (ref 0–5)
SODIUM SERPL-SCNC: 139 MMOL/L (ref 136–145)
SP GR UR REFRACTOMETRY: 1.01 (ref 1–1.03)
SPECIMEN HOLD: NORMAL
UROBILINOGEN UR QL STRIP.AUTO: 0.2 EU/DL (ref 0.2–1)
WBC URNS QL MICRO: ABNORMAL /HPF (ref 0–4)

## 2024-12-16 ENCOUNTER — TELEPHONE (OUTPATIENT)
Dept: PRIMARY CARE CLINIC | Facility: CLINIC | Age: 74
End: 2024-12-16

## 2024-12-16 NOTE — TELEPHONE ENCOUNTER
Patient called to let Dr Victor know that her surgery date is 1/2/2025.  If you need any other information please give her a call back at #[phone]

## 2025-01-15 ENCOUNTER — HOSPITAL ENCOUNTER (OUTPATIENT)
Dept: PHYSICAL THERAPY | Facility: HOSPITAL | Age: 75
Setting detail: RECURRING SERIES
Discharge: HOME | End: 2025-01-18
Attending: INTERNAL MEDICINE
Payer: MEDICARE

## 2025-01-15 PROCEDURE — 97161 PT EVAL LOW COMPLEX 20 MIN: CPT

## 2025-01-15 PROCEDURE — 97110 THERAPEUTIC EXERCISES: CPT

## 2025-01-15 NOTE — THERAPY EVALUATION
consistent with post op healing in the acute phase. The pt would benefit from skilled physical therapy in order to address these impairments and to return her to maximal level of function pain free.      Evaluation Complexity:  History:  MEDIUM  Complexity : 1-2 comorbidities / personal factors will impact the outcome/ POC ; Examination:  LOW Complexity : 1-2 Standardized tests and measures addressing body structure, function, activity limitation and / or participation in recreation  ;Presentation:  LOW Complexity : Stable, uncomplicated  ;Clinical Decision Making:  MEDIUM Complexity : FOTO score of 26-74 Overall Complexity Rating: LOW   Problem List: pain affecting function, decrease ROM, decrease strength, decrease ADL/functional abilities, decrease activity tolerance, decrease flexibility/joint mobility, and decrease transfer abilities    Treatment Plan may include any combination of the followin Therapeutic Exercise, 86306 Neuromuscular Re-Education, 94857 Manual Therapy, 47491 Self Care/Home Management, and 38116 Vasopneumatic Device  (Vasopnuematic compression justification:  Per bilateral girth measures taken and listed above the edema is considered significant and having an impact on the patient's strength, transfers, self care, and ADL's)  Patient / Family readiness to learn indicated by: asking questions, trying to perform skills, interest, return verbalization , and return demonstration   Persons(s) to be included in education: patient (P)  Barriers to Learning/Limitations: none  Measures taken if barriers to learning present: -  Patient Self Reported Health Status: good  Rehabilitation Potential: excellent    Short Term Goals: To be accomplished in 12 treatments.  The patient will be independent with introductory HEP with no v.c. or tactile cuing by PT needed   The patient will improve AROM Right shoulder flexion AROM to 130 deg to improve ease with overhead reaching tasks  The patient will

## 2025-01-17 DIAGNOSIS — I10 PRIMARY HYPERTENSION: ICD-10-CM

## 2025-01-18 RX ORDER — LISINOPRIL 10 MG/1
10 TABLET ORAL DAILY
Qty: 90 TABLET | Refills: 0 | Status: SHIPPED | OUTPATIENT
Start: 2025-01-18

## 2025-01-18 RX ORDER — AMLODIPINE BESYLATE 10 MG/1
10 TABLET ORAL DAILY
Qty: 90 TABLET | Refills: 0 | Status: SHIPPED | OUTPATIENT
Start: 2025-01-18

## 2025-01-23 ENCOUNTER — HOSPITAL ENCOUNTER (OUTPATIENT)
Dept: PHYSICAL THERAPY | Facility: HOSPITAL | Age: 75
Setting detail: RECURRING SERIES
Discharge: HOME | End: 2025-01-26
Attending: INTERNAL MEDICINE
Payer: MEDICARE

## 2025-01-23 PROCEDURE — 97110 THERAPEUTIC EXERCISES: CPT

## 2025-01-23 PROCEDURE — 97016 VASOPNEUMATIC DEVICE THERAPY: CPT

## 2025-01-23 NOTE — PROGRESS NOTES
w/ice    []  w/heat        min [] Estim Attended,             type/location:       []  w/ice   []  w/heat         []  w/US   []  TENS insruct            min []  Mechanical Traction,        type/lbs:        []  pro      []  sup           []  int       []  cont            []  before manual           []  after manual     min []  Ultrasound,         settings/location:     8 pre min  unbilled []  Ice     [x]  Heat            location/position:sitting     10 post     min [x]  Vasopneumatic Device,      press/temp: 34 low     If using vaso (only need to measure limb vaso being performed on)        min []  Other:      Skin assessment post-treatment (if applicable):    [x]  intact    []  redness- no adverse reaction                 []redness - adverse reaction:          [x]  Patient Education billed concurrently with other procedures   [x] Review HEP    [] Progressed/Changed HEP, detail:    [] Other detail:         Other Objective/Functional Measures  Flexion 140, ER 60    Pain Level at end of session (0-10 scale): 0/10      Assessment   The pt tolerated session well. Focused on PROM. Felt sore following bc of movement but no pain.   Patient will continue to benefit from skilled PT / OT services to modify and progress therapeutic interventions, analyze and address functional mobility deficits, analyze and address ROM deficits, analyze and address strength deficits, analyze and address soft tissue restrictions, analyze and cue for proper movement patterns, analyze and modify for postural abnormalities, and instruct in home and community integration to address functional deficits and attain remaining goals.    Progress toward goals / Updated goals:  []  See Progress Note/Recertification    Short Term Goals: To be accomplished in 12 treatments.  The patient will be independent with introductory HEP with no v.c. or tactile cuing by PT needed   The patient will improve AROM Right shoulder flexion AROM to 130 deg to improve ease

## 2025-01-27 ENCOUNTER — HOSPITAL ENCOUNTER (OUTPATIENT)
Dept: PHYSICAL THERAPY | Facility: HOSPITAL | Age: 75
Setting detail: RECURRING SERIES
Discharge: HOME | End: 2025-01-30
Attending: INTERNAL MEDICINE
Payer: MEDICARE

## 2025-01-27 PROCEDURE — 97016 VASOPNEUMATIC DEVICE THERAPY: CPT

## 2025-01-27 PROCEDURE — 97110 THERAPEUTIC EXERCISES: CPT

## 2025-01-27 NOTE — PROGRESS NOTES
PHYSICAL THERAPY - MEDICARE DAILY TREATMENT NOTE (updated 3/23)      Date: 2025          Patient Name:  Rosalinda Elliott :  1950   Medical   Diagnosis:  Nontraumatic tear of right rotator cuff, unspecified tear extent [M75.101] Treatment Diagnosis:  M25.511  RIGHT SHOULDER PAIN    Referral Source:  Mark Weiss III, MD Insurance:   Payor: [employer] / Plan: [facility] COMPLETE HMO / Product Type: *No Product type* /                     Patient  verified yes     Visit #   Current  / Total 3 20   Time   In / Out 8:32A 9:25A   Total Treatment Time 53   Total Timed Codes 35   1:1 Treatment Time 35      [facility] Totals Reminder:  bill using total billable   min of TIMED therapeutic procedures and modalities.   8-22 min = 1 unit; 23-37 min = 2 units; 38-52 min = 3 units; 53-67 min = 4 units; 68-82 min = 5 units            SUBJECTIVE    Pain Level (0-10 scale): 7/10    Any medication changes, allergies to medications, adverse drug reactions, diagnosis change, or new procedure performed?: [x] No    [] Yes (see summary sheet for update)  Medications: Verified on Patient Summary List    Subjective functional status/changes:     Pt reports she did wear her sling for a few hours on Saturday and has been sore since.     OBJECTIVE      Therapeutic Procedures:  Tx Min Billable or 1:1 Min (if diff from Tx Min) Procedure, Rationale, Specifics   35 35 [id number] Therapeutic Exercise (timed):  increase ROM, strength, coordination, balance, and proprioception to improve patient's ability to progress to PLOF and address remaining functional goals. (see flow sheet as applicable)     Details if applicable:  PROM into flexion, abduction and ER.                        35 35    Total Total         Modalities Rationale:     decrease inflammation, decrease pain, and increase tissue extensibility to improve patient's ability to progress to PLOF and address remaining functional goals.       min [] Estim

## 2025-01-29 ENCOUNTER — HOSPITAL ENCOUNTER (OUTPATIENT)
Dept: PHYSICAL THERAPY | Facility: HOSPITAL | Age: 75
Setting detail: RECURRING SERIES
Discharge: HOME | End: 2025-02-01
Attending: INTERNAL MEDICINE
Payer: MEDICARE

## 2025-01-29 PROCEDURE — 97016 VASOPNEUMATIC DEVICE THERAPY: CPT

## 2025-01-29 PROCEDURE — 97110 THERAPEUTIC EXERCISES: CPT

## 2025-01-29 NOTE — PROGRESS NOTES
PHYSICAL THERAPY - MEDICARE DAILY TREATMENT NOTE (updated 3/23)      Date: 2025          Patient Name:  Rosalinda Elliott :  1950   Medical   Diagnosis:  Nontraumatic tear of right rotator cuff, unspecified tear extent [M75.101] Treatment Diagnosis:  M25.511  RIGHT SHOULDER PAIN    Referral Source:  Mark Weiss III, MD Insurance:   Payor: [employer] / Plan: [facility] COMPLETE HMO / Product Type: *No Product type* /                     Patient  verified yes     Visit #   Current  / Total 4 20   Time   In / Out 8:22A 9:20A   Total Treatment Time 58   Total Timed Codes 40   1:1 Treatment Time 40      [facility] Totals Reminder:  bill using total billable   min of TIMED therapeutic procedures and modalities.   8-22 min = 1 unit; 23-37 min = 2 units; 38-52 min = 3 units; 53-67 min = 4 units; 68-82 min = 5 units            SUBJECTIVE    Pain Level (0-10 scale): 0/10    Any medication changes, allergies to medications, adverse drug reactions, diagnosis change, or new procedure performed?: [x] No    [] Yes (see summary sheet for update)  Medications: Verified on Patient Summary List    Subjective functional status/changes:     Pt reports she slept well last night and shoulder is feeling better.     OBJECTIVE      Therapeutic Procedures:  Tx Min Billable or 1:1 Min (if diff from Tx Min) Procedure, Rationale, Specifics   40 40 40535 Therapeutic Exercise (timed):  increase ROM, strength, coordination, balance, and proprioception to improve patient's ability to progress to PLOF and address remaining functional goals. (see flow sheet as applicable)     Details if applicable:  PROM into flexion, abduction and ER.                        40 40    Total Total         Modalities Rationale:     decrease inflammation, decrease pain, and increase tissue extensibility to improve patient's ability to progress to PLOF and address remaining functional goals.       min [] Estim Unattended,

## 2025-02-03 ENCOUNTER — HOSPITAL ENCOUNTER (OUTPATIENT)
Dept: PHYSICAL THERAPY | Facility: HOSPITAL | Age: 75
Setting detail: RECURRING SERIES
Discharge: HOME | End: 2025-02-06
Attending: INTERNAL MEDICINE
Payer: MEDICARE

## 2025-02-03 PROCEDURE — 97016 VASOPNEUMATIC DEVICE THERAPY: CPT

## 2025-02-03 PROCEDURE — 97110 THERAPEUTIC EXERCISES: CPT

## 2025-02-03 NOTE — PROGRESS NOTES
PHYSICAL THERAPY - MEDICARE DAILY TREATMENT NOTE (updated 3/23)      Date: 2/3/2025          Patient Name:  Rosalinda Elliott :  1950   Medical   Diagnosis:  Nontraumatic tear of right rotator cuff, unspecified tear extent [M75.101] Treatment Diagnosis:  M25.511  RIGHT SHOULDER PAIN    Referral Source:  Mark Weiss III, MD Insurance:   Payor: [employer] / Plan: [facility] COMPLETE HMO / Product Type: *No Product type* /                     Patient  verified yes     Visit #   Current  / Total 5 20   Time   In / Out 8:30A 9:28A   Total Treatment Time 58   Total Timed Codes 38   1:1 Treatment Time 38      [facility] Totals Reminder:  bill using total billable   min of TIMED therapeutic procedures and modalities.   8-22 min = 1 unit; 23-37 min = 2 units; 38-52 min = 3 units; 53-67 min = 4 units; 68-82 min = 5 units            SUBJECTIVE    Pain Level (0-10 scale): 0/10    Any medication changes, allergies to medications, adverse drug reactions, diagnosis change, or new procedure performed?: [x] No    [] Yes (see summary sheet for update)  Medications: Verified on Patient Summary List    Subjective functional status/changes:     Pt reports she slept well last night and shoulder is feeling better.     OBJECTIVE      Therapeutic Procedures:  Tx Min Billable or 1:1 Min (if diff from Tx Min) Procedure, Rationale, Specifics   38 38 [id number] Therapeutic Exercise (timed):  increase ROM, strength, coordination, balance, and proprioception to improve patient's ability to progress to PLOF and address remaining functional goals. (see flow sheet as applicable)     Details if applicable:  PROM into flexion, abduction and ER.                        38 38    Total Total         Modalities Rationale:     decrease inflammation, decrease pain, and increase tissue extensibility to improve patient's ability to progress to PLOF and address remaining functional goals.       min [] Estim Unattended,

## 2025-02-05 ENCOUNTER — HOSPITAL ENCOUNTER (OUTPATIENT)
Dept: PHYSICAL THERAPY | Facility: HOSPITAL | Age: 75
Setting detail: RECURRING SERIES
Discharge: HOME | End: 2025-02-08
Attending: INTERNAL MEDICINE
Payer: MEDICARE

## 2025-02-05 PROCEDURE — 97016 VASOPNEUMATIC DEVICE THERAPY: CPT

## 2025-02-05 PROCEDURE — 97110 THERAPEUTIC EXERCISES: CPT

## 2025-02-05 NOTE — PROGRESS NOTES
PHYSICAL THERAPY - MEDICARE DAILY TREATMENT NOTE (updated 3/23)      Date: 2025          Patient Name:  Rosalinda Elliott :  1950   Medical   Diagnosis:  Nontraumatic tear of right rotator cuff, unspecified tear extent [M75.101] Treatment Diagnosis:  M25.511  RIGHT SHOULDER PAIN    Referral Source:  Mark Weiss III, MD Insurance:   Payor: [employer] / Plan: [facility] COMPLETE HMO / Product Type: *No Product type* /                     Patient  verified yes     Visit #   Current  / Total 6 20   Time   In / Out 8:30A 9:25A   Total Treatment Time 55   Total Timed Codes 35   1:1 Treatment Time 35      [facility] Totals Reminder:  bill using total billable   min of TIMED therapeutic procedures and modalities.   8-22 min = 1 unit; 23-37 min = 2 units; 38-52 min = 3 units; 53-67 min = 4 units; 68-82 min = 5 units            SUBJECTIVE    Pain Level (0-10 scale): 0/10    Any medication changes, allergies to medications, adverse drug reactions, diagnosis change, or new procedure performed?: [x] No    [] Yes (see summary sheet for update)  Medications: Verified on Patient Summary List    Subjective functional status/changes:     Pt reports she slept well last night and shoulder is feeling better.     OBJECTIVE      Therapeutic Procedures:    Tx Min Billable or 1:1 Min (if diff from Tx Min) Procedure, Rationale, Specifics   35 35 [id number] Therapeutic Exercise (timed):  increase ROM, strength, coordination, balance, and proprioception to improve patient's ability to progress to PLOF and address remaining functional goals. (see flow sheet as applicable)     Details if applicable:  PROM into flexion, abduction and ER.                        35 35    Total Total         Modalities Rationale:     decrease inflammation, decrease pain, and increase tissue extensibility to improve patient's ability to progress to PLOF and address remaining functional goals.       min [] Estim Unattended,

## 2025-02-10 ENCOUNTER — HOSPITAL ENCOUNTER (OUTPATIENT)
Dept: PHYSICAL THERAPY | Facility: HOSPITAL | Age: 75
Setting detail: RECURRING SERIES
Discharge: HOME | End: 2025-02-13
Attending: INTERNAL MEDICINE
Payer: MEDICARE

## 2025-02-10 PROCEDURE — 97016 VASOPNEUMATIC DEVICE THERAPY: CPT

## 2025-02-10 PROCEDURE — 97110 THERAPEUTIC EXERCISES: CPT

## 2025-02-10 NOTE — PROGRESS NOTES
PHYSICAL THERAPY - MEDICARE DAILY TREATMENT NOTE (updated 3/23)      Date: 2/10/2025          Patient Name:  Rosalinda Elliott :  1950   Medical   Diagnosis:  Nontraumatic tear of right rotator cuff, unspecified tear extent [M75.101] Treatment Diagnosis:  M25.511  RIGHT SHOULDER PAIN    Referral Source:  Mark Weiss III, MD Insurance:   Payor: [employer] / Plan: [facility] COMPLETE HMO / Product Type: *No Product type* /                     Patient  verified yes     Visit #   Current  / Total 6 20   Time   In / Out 8:30A 9:28A   Total Treatment Time 58   Total Timed Codes 38   1:1 Treatment Time 38      [facility] Totals Reminder:  bill using total billable   min of TIMED therapeutic procedures and modalities.   8-22 min = 1 unit; 23-37 min = 2 units; 38-52 min = 3 units; 53-67 min = 4 units; 68-82 min = 5 units            SUBJECTIVE    Pain Level (0-10 scale):7/10    Any medication changes, allergies to medications, adverse drug reactions, diagnosis change, or new procedure performed?: [x] No    [] Yes (see summary sheet for update)  Medications: Verified on Patient Summary List    Subjective functional status/changes:     Pt reports she has pain sleeping at night.     OBJECTIVE      Therapeutic Procedures:  Tx Min Billable or 1:1 Min (if diff from Tx Min) Procedure, Rationale, Specifics   38 38 [id number] Therapeutic Exercise (timed):  increase ROM, strength, coordination, balance, and proprioception to improve patient's ability to progress to PLOF and address remaining functional goals. (see flow sheet as applicable)     Details if applicable:  PROM into flexion, abduction and ER.                        38 38    Total Total         Modalities Rationale:     decrease inflammation, decrease pain, and increase tissue extensibility to improve patient's ability to progress to PLOF and address remaining functional goals.       min [] Estim Unattended,             type/location:       []  w/ice

## 2025-02-12 ENCOUNTER — APPOINTMENT (OUTPATIENT)
Dept: PHYSICAL THERAPY | Facility: HOSPITAL | Age: 75
End: 2025-02-12
Attending: INTERNAL MEDICINE
Payer: MEDICARE

## 2025-02-19 ENCOUNTER — HOSPITAL ENCOUNTER (OUTPATIENT)
Dept: PHYSICAL THERAPY | Facility: HOSPITAL | Age: 75
Setting detail: RECURRING SERIES
Discharge: HOME | End: 2025-02-22
Attending: INTERNAL MEDICINE
Payer: MEDICARE

## 2025-02-19 PROCEDURE — 97110 THERAPEUTIC EXERCISES: CPT

## 2025-02-19 NOTE — PROGRESS NOTES
PHYSICAL THERAPY - MEDICARE DAILY TREATMENT NOTE (updated 3/23)      Date: 2025          Patient Name:  Rosalinda Elliott :  1950   Medical   Diagnosis:  Nontraumatic tear of right rotator cuff, unspecified tear extent [M75.101] Treatment Diagnosis:  M25.511  RIGHT SHOULDER PAIN    Referral Source:  Mark Weiss III, MD Insurance:   Payor: [employer] / Plan: [facility] COMPLETE HMO / Product Type: *No Product type* /                     Patient  verified yes     Visit #   Current  / Total 7 20   Time   In / Out 8:30A 9:30A   Total Treatment Time 60   Total Timed Codes 40   1:1 Treatment Time 40      [facility] Totals Reminder:  bill using total billable   min of TIMED therapeutic procedures and modalities.   8-22 min = 1 unit; 23-37 min = 2 units; 38-52 min = 3 units; 53-67 min = 4 units; 68-82 min = 5 units            SUBJECTIVE    Pain Level (0-10 scale):3/10    Any medication changes, allergies to medications, adverse drug reactions, diagnosis change, or new procedure performed?: [x] No    [] Yes (see summary sheet for update)  Medications: Verified on Patient Summary List    Subjective functional status/changes:     Pt reports she is feeling better.     OBJECTIVE      Therapeutic Procedures:    Tx Min Billable or 1:1 Min (if diff from Tx Min) Procedure, Rationale, Specifics   40 40 [id number] Therapeutic Exercise (timed):  increase ROM, strength, coordination, balance, and proprioception to improve patient's ability to progress to PLOF and address remaining functional goals. (see flow sheet as applicable)     Details if applicable:  PROM into flexion, abduction and ER.                        40 40    Total Total         Modalities Rationale:     decrease inflammation, decrease pain, and increase tissue extensibility to improve patient's ability to progress to PLOF and address remaining functional goals.       min [] Estim Unattended,             type/location:       []  w/ice    []

## 2025-02-25 ENCOUNTER — TRANSCRIBE ORDERS (OUTPATIENT)
Facility: HOSPITAL | Age: 75
End: 2025-02-25

## 2025-02-25 DIAGNOSIS — Z12.31 ENCOUNTER FOR SCREENING MAMMOGRAM FOR MALIGNANT NEOPLASM OF BREAST: Primary | ICD-10-CM

## 2025-02-28 ENCOUNTER — HOSPITAL ENCOUNTER (OUTPATIENT)
Dept: PHYSICAL THERAPY | Facility: HOSPITAL | Age: 75
Setting detail: RECURRING SERIES
End: 2025-02-28
Attending: INTERNAL MEDICINE
Payer: MEDICARE

## 2025-02-28 PROCEDURE — 97110 THERAPEUTIC EXERCISES: CPT

## 2025-02-28 NOTE — PROGRESS NOTES
PHYSICAL THERAPY - MEDICARE DAILY TREATMENT NOTE (updated 3/23)      Date: 2025          Patient Name:  Rosalinda Elliott :  1950   Medical   Diagnosis:  Nontraumatic tear of right rotator cuff, unspecified tear extent [M75.101] Treatment Diagnosis:  M25.511  RIGHT SHOULDER PAIN    Referral Source:  Mark Weiss III, MD Insurance:   Payor: [employer] / Plan: [facility] HMO / Product Type: *No Product type* /                     Patient  verified yes     Visit #   Current  / Total 8 20   Time   In / Out 9:16A 10:10A   Total Treatment Time 54   Total Timed Codes 38   1:1 Treatment Time 38      [facility] Totals Reminder:  bill using total billable   min of TIMED therapeutic procedures and modalities.   8-22 min = 1 unit; 23-37 min = 2 units; 38-52 min = 3 units; 53-67 min = 4 units; 68-82 min = 5 units            SUBJECTIVE    Pain Level (0-10 scale):0/10    Any medication changes, allergies to medications, adverse drug reactions, diagnosis change, or new procedure performed?: [x] No    [] Yes (see summary sheet for update)  Medications: Verified on Patient Summary List    Subjective functional status/changes:     Pt reports she is feeling better.     OBJECTIVE      Therapeutic Procedures:    Tx Min Billable or 1:1 Min (if diff from Tx Min) Procedure, Rationale, Specifics   38 38 [id number] Therapeutic Exercise (timed):  increase ROM, strength, coordination, balance, and proprioception to improve patient's ability to progress to PLOF and address remaining functional goals. (see flow sheet as applicable)     Details if applicable:  PROM into flexion, abduction and ER.                        38 38    Total Total         Modalities Rationale:     decrease inflammation, decrease pain, and increase tissue extensibility to improve patient's ability to progress to PLOF and address remaining functional goals.       min [] Estim Unattended,             type/location:       []  w/ice    []   w/heat        min [] Estim Attended,             type/location:       []  w/ice   []  w/heat         []  w/US   []  TENS insruct            min []  Mechanical Traction,        type/lbs:        []  pro      []  sup           []  int       []  cont            []  before manual           []  after manual     min []  Ultrasound,         settings/location:     8 pre/8 post min  unbilled [x]  Ice     [x]  Heat            location/position:sitting          min []  Vasopneumatic Device,      press/temp: 34 low     If using vaso (only need to measure limb vaso being performed on)        min []  Other:      Skin assessment post-treatment (if applicable):    [x]  intact    []  redness- no adverse reaction                 []redness - adverse reaction:          [x]  Patient Education billed concurrently with other procedures   [x] Review HEP    [] Progressed/Changed HEP, detail:    [] Other detail:         Other Objective/Functional Measures      Pain Level at end of session (0-10 scale): 0/10      Assessment:   The pt tolerated session well. Able to add finger ladder into flexion and abduction without pain.   Patient will continue to benefit from skilled PT / OT services to modify and progress therapeutic interventions, analyze and address functional mobility deficits, analyze and address ROM deficits, analyze and address strength deficits, analyze and address soft tissue restrictions, analyze and cue for proper movement patterns, analyze and modify for postural abnormalities, and instruct in home and community integration to address functional deficits and attain remaining goals.    Progress toward goals / Updated goals:  []  See Progress Note/Recertification    Short Term Goals: To be accomplished in 12 treatments.  The patient will be independent with introductory HEP with no v.c. or tactile cuing by PT needed Progressing   The patient will improve AROM Right shoulder flexion AROM to 130 deg to improve ease with overhead

## 2025-03-03 ENCOUNTER — HOSPITAL ENCOUNTER (OUTPATIENT)
Dept: PHYSICAL THERAPY | Facility: HOSPITAL | Age: 75
Setting detail: RECURRING SERIES
Discharge: HOME | End: 2025-03-06
Attending: INTERNAL MEDICINE
Payer: MEDICARE

## 2025-03-03 PROCEDURE — 97110 THERAPEUTIC EXERCISES: CPT

## 2025-03-03 NOTE — PROGRESS NOTES
to improve ease with overhead reaching tasks Progressing  The patient will demonstrate full PROM in all planes of motion in order to progress to strengthening per protocol. Progressing well  The pt is able to sleep without pain for > 4 hours Progressing well     Long Term Goals: To be accomplished in 24 treatments.  The patient will demonstrate pain free shoulder AROM multidirectionally to improve ease with household chores such as dishes, laundry and cleaning tasks  The patient will report ability to unload groceries from car into home without an increase in pain within the last 5 days  The patient will demonstrate a minimum of 4/5 strength of right shoulder to allow for UE endurance for fulling loading and transferring clothing from washer and dryer without rest breaks and without an increase in pain  The patient will demonstrate ability to lift 2 pounds overhead with pain level of 2/10 or less to improve ease of putting away dishes in kitchen         Frequency / Duration: Patient to be seen 2 times per week for 24 treatments.      PLAN  Yes  Continue plan of care  Re-Cert Due: 64 units allowed per insurance, reassess after 10th sesssion  []  Upgrade activities as tolerated  []  Discharge due to:  []  Other:      Flor Rutherford, PT       3/3/2025       8:03 AM

## 2025-03-05 ENCOUNTER — HOSPITAL ENCOUNTER (OUTPATIENT)
Dept: PHYSICAL THERAPY | Facility: HOSPITAL | Age: 75
Setting detail: RECURRING SERIES
Discharge: HOME | End: 2025-03-08
Attending: INTERNAL MEDICINE
Payer: MEDICARE

## 2025-03-05 PROCEDURE — 97110 THERAPEUTIC EXERCISES: CPT

## 2025-03-05 NOTE — PROGRESS NOTES
AROM to 130 deg to improve ease with overhead reaching tasks Progressing  The patient will demonstrate full PROM in all planes of motion in order to progress to strengthening per protocol. Progressing well  The pt is able to sleep without pain for > 4 hours Progressing well     Long Term Goals: To be accomplished in 24 treatments.  The patient will demonstrate pain free shoulder AROM multidirectionally to improve ease with household chores such as dishes, laundry and cleaning tasks  The patient will report ability to unload groceries from car into home without an increase in pain within the last 5 days  The patient will demonstrate a minimum of 4/5 strength of right shoulder to allow for UE endurance for fulling loading and transferring clothing from washer and dryer without rest breaks and without an increase in pain  The patient will demonstrate ability to lift 2 pounds overhead with pain level of 2/10 or less to improve ease of putting away dishes in kitchen         Frequency / Duration: Patient to be seen 2 times per week for 24 treatments.      PLAN  Yes  Continue plan of care  Re-Cert Due: 64 units allowed per insurance, reassess after 10th sesssion  []  Upgrade activities as tolerated  []  Discharge due to:  []  Other:      Flor Rutherford, PT       3/5/2025       8:10 AM

## 2025-03-10 ENCOUNTER — HOSPITAL ENCOUNTER (OUTPATIENT)
Dept: PHYSICAL THERAPY | Facility: HOSPITAL | Age: 75
Setting detail: RECURRING SERIES
Discharge: HOME | End: 2025-03-13
Attending: INTERNAL MEDICINE
Payer: MEDICARE

## 2025-03-10 PROCEDURE — 97110 THERAPEUTIC EXERCISES: CPT

## 2025-03-10 NOTE — PROGRESS NOTES
PHYSICAL THERAPY - MEDICARE DAILY TREATMENT NOTE (updated 3/23)      Date: 3/10/2025          Patient Name:  Rosalinda Elliott :  1950   Medical   Diagnosis:  Nontraumatic tear of right rotator cuff, unspecified tear extent [M75.101] Treatment Diagnosis:  M25.511  RIGHT SHOULDER PAIN    Referral Source:  Mark Weiss III, MD Insurance:   Payor: [employer] / Plan: [facility] COMPLETE HMO / Product Type: *No Product type* /                     Patient  verified yes     Visit #   Current  / Total 12 20   Time   In / Out 8:30A 9:35A   Total Treatment Time 65   Total Timed Codes 49   1:1 Treatment Time 49      [facility] Totals Reminder:  bill using total billable   min of TIMED therapeutic procedures and modalities.   8-22 min = 1 unit; 23-37 min = 2 units; 38-52 min = 3 units; 53-67 min = 4 units; 68-82 min = 5 units            SUBJECTIVE    Pain Level (0-10 scale): 0/10    Any medication changes, allergies to medications, adverse drug reactions, diagnosis change, or new procedure performed?: [x] No    [] Yes (see summary sheet for update)  Medications: Verified on Patient Summary List    Subjective functional status/changes:     Pt reports she is feeling good.     OBJECTIVE      Therapeutic Procedures:    Tx Min Billable or 1:1 Min (if diff from Tx Min) Procedure, Rationale, Specifics   49 49 [id number] Therapeutic Exercise (timed):  increase ROM, strength, coordination, balance, and proprioception to improve patient's ability to progress to PLOF and address remaining functional goals. (see flow sheet as applicable)     Details if applicable:  PROM into flexion, abduction and ER.                        34 34    Total Total         Modalities Rationale:     decrease inflammation, decrease pain, and increase tissue extensibility to improve patient's ability to progress to PLOF and address remaining functional goals.       min [] Estim Unattended,             type/location:       []  w/ice    []

## 2025-03-14 ENCOUNTER — HOSPITAL ENCOUNTER (OUTPATIENT)
Dept: PHYSICAL THERAPY | Facility: HOSPITAL | Age: 75
Setting detail: RECURRING SERIES
Discharge: HOME | End: 2025-03-17
Attending: INTERNAL MEDICINE
Payer: MEDICARE

## 2025-03-14 PROCEDURE — 97110 THERAPEUTIC EXERCISES: CPT

## 2025-03-14 NOTE — PROGRESS NOTES
Right shoulder flexion AROM to 130 deg to improve ease with overhead reaching tasks Progressing  The patient will demonstrate full PROM in all planes of motion in order to progress to strengthening per protocol. Progressing well  The pt is able to sleep without pain for > 4 hours Progressing well     Long Term Goals: To be accomplished in 24 treatments.  The patient will demonstrate pain free shoulder AROM multidirectionally to improve ease with household chores such as dishes, laundry and cleaning tasks  The patient will report ability to unload groceries from car into home without an increase in pain within the last 5 days  The patient will demonstrate a minimum of 4/5 strength of right shoulder to allow for UE endurance for fulling loading and transferring clothing from washer and dryer without rest breaks and without an increase in pain  The patient will demonstrate ability to lift 2 pounds overhead with pain level of 2/10 or less to improve ease of putting away dishes in kitchen         Frequency / Duration: Patient to be seen 2 times per week for 24 treatments.      PLAN  Yes  Continue plan of care  Re-Cert Due: 64 units allowed per insurance, reassess after 10th sesssion  []  Upgrade activities as tolerated  []  Discharge due to:  []  Other:      Flor Rutherford, PT       3/14/2025       8:54 AM

## 2025-03-17 ENCOUNTER — HOSPITAL ENCOUNTER (OUTPATIENT)
Dept: PHYSICAL THERAPY | Facility: HOSPITAL | Age: 75
Setting detail: RECURRING SERIES
Discharge: HOME | End: 2025-03-20
Attending: INTERNAL MEDICINE
Payer: MEDICARE

## 2025-03-17 PROCEDURE — 97110 THERAPEUTIC EXERCISES: CPT

## 2025-03-17 NOTE — PROGRESS NOTES
[facility], [facility],   a part of [address], [address]  [place] [address]  Phone: [phone]  Fax: [phone]      PHYSICAL THERAPY PROGRESS NOTE  Patient Name:  Rosalinda Elliott :  1950   Treatment/Medical Diagnosis: Nontraumatic tear of right rotator cuff, unspecified tear extent [M75.101]   Referral Source:  Mark Weiss III, MD     Date of Initial Visit:  1/15/25 14  Missed Visits:  Weather related 3     SUMMARY OF TREATMENT/ASSESSMENT:  The pt has been seen for 14 sessions in PT and is gradually making progress with improvement in pain levels, full PROM, pain free AROM and progression into gradual scapular, biceps and triceps strengthening as appropriate.  Plan to progress as she tolerates progressing to RTC strengthening per protocol in next phase of rehabilitation. Thank you for this referral, it has been a pleasure assisting in her care.     Right Shoulder ROM:           PROM      AROM              Flexion                         170(120)          168                    Abduction                    175(90)            170                                                                                           ER                                 90 (60)            T4  IR   60                Back pocket      CURRENT STATUS/GOALS:  Short Term Goals: To be accomplished in 12 treatments.  The patient will be independent with introductory HEP with no v.c. or tactile cuing by PT needed MET  The patient will improve AROM Right shoulder flexion AROM to 130 deg to improve ease with overhead reaching tasks MET  The patient will demonstrate full PROM in all planes of motion in order to progress to strengthening per protocol.  MET  The pt is able to sleep without pain for > 4 hours MET     Long Term Goals: To be accomplished in 24 treatments.  The patient will demonstrate pain free shoulder AROM multidirectionally to improve ease with household 
deficits, analyze and address strength deficits, analyze and address soft tissue restrictions, analyze and cue for proper movement patterns, analyze and modify for postural abnormalities, and instruct in home and community integration to address functional deficits and attain remaining goals.       CURRENT STATUS/GOALS:  Short Term Goals: To be accomplished in 12 treatments.  The patient will be independent with introductory HEP with no v.c. or tactile cuing by PT needed MET  The patient will improve AROM Right shoulder flexion AROM to 130 deg to improve ease with overhead reaching tasks MET  The patient will demonstrate full PROM in all planes of motion in order to progress to strengthening per protocol.  MET  The pt is able to sleep without pain for > 4 hours MET     Long Term Goals: To be accomplished in 24 treatments.  The patient will demonstrate pain free shoulder AROM multidirectionally to improve ease with household chores such as dishes, laundry and cleaning tasks Progressing WEll  The patient will report ability to unload groceries from car into home without an increase in pain within the last 5 days progressing well  The patient will demonstrate a minimum of 4/5 strength of right shoulder to allow for UE endurance for fulling loading and transferring clothing from washer and dryer without rest breaks and without an increase in pain progressing well  The patient will demonstrate ability to lift 2 pounds overhead with pain level of 2/10 or less to improve ease of putting away dishes in kitchen progressing               Frequency / Duration: Patient to be seen 2 times per week for 24 treatments.      PLAN  Yes  Continue plan of care  Re-Cert Due: 64 units allowed per insurance, reassess after 10th sesssion  []  Upgrade activities as tolerated  []  Discharge due to:  []  Other:      Flor Rutherford, PT       3/17/2025       9:11 AM

## 2025-03-19 ENCOUNTER — HOSPITAL ENCOUNTER (OUTPATIENT)
Dept: PHYSICAL THERAPY | Facility: HOSPITAL | Age: 75
Setting detail: RECURRING SERIES
Discharge: HOME | End: 2025-03-22
Attending: INTERNAL MEDICINE
Payer: MEDICARE

## 2025-03-19 PROCEDURE — 97110 THERAPEUTIC EXERCISES: CPT

## 2025-03-19 NOTE — PROGRESS NOTES
PHYSICAL THERAPY - MEDICARE DAILY TREATMENT NOTE (updated 3/23)      Date: 3/19/2025          Patient Name:  Rosalinda Elliott :  1950   Medical   Diagnosis:  Nontraumatic tear of right rotator cuff, unspecified tear extent [M75.101] Treatment Diagnosis:  M25.511  RIGHT SHOULDER PAIN    Referral Source:  Mark Weiss III, MD Insurance:   Payor: [employer] / Plan: [facility] COMPLETE HMO / Product Type: *No Product type* /                     Patient  verified yes     Visit #   Current  / Total 15 20   Time   In / Out 8:30A 9:30A   Total Treatment Time 60   Total Timed Codes 42   1:1 Treatment Time 42       [facility] Totals Reminder:  bill using total billable   min of TIMED therapeutic procedures and modalities.   8-22 min = 1 unit; 23-37 min = 2 units; 38-52 min = 3 units; 53-67 min = 4 units; 68-82 min = 5 units            SUBJECTIVE    Pain Level (0-10 scale): 0/10    Any medication changes, allergies to medications, adverse drug reactions, diagnosis change, or new procedure performed?: [x] No    [] Yes (see summary sheet for update)  Medications: Verified on Patient Summary List    Subjective functional status/changes:     Pt reports she is a little sore, but not bad. Just from exercising. Saw the MD and he was pleased with progress. Returns in 4 weeks.     OBJECTIVE      Therapeutic Procedures:    Tx Min Billable or 1:1 Min (if diff from Tx Min) Procedure, Rationale, Specifics   42 42 [id number] Therapeutic Exercise (timed):  increase ROM, strength, coordination, balance, and proprioception to improve patient's ability to progress to PLOF and address remaining functional goals. (see flow sheet as applicable)     Details if applicable:  PROM into flexion, abduction and ER.                        42 42    Total Total         Modalities Rationale:     decrease inflammation, decrease pain, and increase tissue extensibility to improve patient's ability to progress to PLOF and address

## 2025-03-25 ENCOUNTER — HOSPITAL ENCOUNTER (OUTPATIENT)
Dept: PHYSICAL THERAPY | Facility: HOSPITAL | Age: 75
Setting detail: RECURRING SERIES
Discharge: HOME | End: 2025-03-28
Attending: INTERNAL MEDICINE
Payer: MEDICARE

## 2025-03-25 PROCEDURE — 97110 THERAPEUTIC EXERCISES: CPT

## 2025-03-25 NOTE — PROGRESS NOTES
PHYSICAL THERAPY - MEDICARE DAILY TREATMENT NOTE (updated 3/23)      Date: 3/25/2025          Patient Name:  Rosalinda Elliott :  1950   Medical   Diagnosis:  Nontraumatic tear of right rotator cuff, unspecified tear extent [M75.101] Treatment Diagnosis:  M25.511  RIGHT SHOULDER PAIN    Referral Source:  Mark Weiss III, MD Insurance:   Payor: [employer] / Plan: [facility] COMPLETE HMO / Product Type: *No Product type* /                     Patient  verified yes     Visit #   Current  / Total 16 20   Time   In / Out 9:30A 10:30A   Total Treatment Time 60   Total Timed Codes 42   1:1 Treatment Time 42      [facility] Totals Reminder:  bill using total billable   min of TIMED therapeutic procedures and modalities.   8-22 min = 1 unit; 23-37 min = 2 units; 38-52 min = 3 units; 53-67 min = 4 units; 68-82 min = 5 units            SUBJECTIVE    Pain Level (0-10 scale): 0/10    Any medication changes, allergies to medications, adverse drug reactions, diagnosis change, or new procedure performed?: [x] No    [] Yes (see summary sheet for update)  Medications: Verified on Patient Summary List    Subjective functional status/changes:     Pt reports she is still having pain at night and sleeping. Unable to sleep past 2am without having to change positions or move.     OBJECTIVE      Therapeutic Procedures:    Tx Min Billable or 1:1 Min (if diff from Tx Min) Procedure, Rationale, Specifics   42 42 [id number] Therapeutic Exercise (timed):  increase ROM, strength, coordination, balance, and proprioception to improve patient's ability to progress to PLOF and address remaining functional goals. (see flow sheet as applicable)     Details if applicable:  PROM into flexion, abduction and ER.                        42 42    Total Total         Modalities Rationale:     decrease inflammation, decrease pain, and increase tissue extensibility to improve patient's ability to progress to PLOF and address remaining

## 2025-03-26 ENCOUNTER — HOSPITAL ENCOUNTER (OUTPATIENT)
Facility: HOSPITAL | Age: 75
Discharge: HOME | End: 2025-03-29
Attending: INTERNAL MEDICINE
Payer: MEDICARE

## 2025-03-26 ENCOUNTER — RESULTS FOLLOW-UP (OUTPATIENT)
Dept: PRIMARY CARE CLINIC | Facility: CLINIC | Age: 75
End: 2025-03-26

## 2025-03-26 VITALS — HEIGHT: 57 IN | WEIGHT: 145 LBS | BODY MASS INDEX: 31.28 KG/M2

## 2025-03-26 DIAGNOSIS — Z12.31 ENCOUNTER FOR SCREENING MAMMOGRAM FOR MALIGNANT NEOPLASM OF BREAST: ICD-10-CM

## 2025-03-26 PROCEDURE — 77063 BREAST TOMOSYNTHESIS BI: CPT

## 2025-04-03 ENCOUNTER — HOSPITAL ENCOUNTER (OUTPATIENT)
Dept: PHYSICAL THERAPY | Facility: HOSPITAL | Age: 75
Setting detail: RECURRING SERIES
Discharge: HOME | End: 2025-04-06
Attending: INTERNAL MEDICINE
Payer: MEDICARE

## 2025-04-03 PROCEDURE — 97110 THERAPEUTIC EXERCISES: CPT

## 2025-04-03 PROCEDURE — 97140 MANUAL THERAPY 1/> REGIONS: CPT

## 2025-04-03 NOTE — PROGRESS NOTES
PHYSICAL THERAPY - MEDICARE DAILY TREATMENT NOTE (updated 3/23)      Date: 4/3/2025          Patient Name:  Rosalinda Elliott :  1950   Medical   Diagnosis:  Nontraumatic tear of right rotator cuff, unspecified tear extent [M75.101] Treatment Diagnosis:  M25.511  RIGHT SHOULDER PAIN    Referral Source:  Mark Weiss III, MD Insurance:   Payor: [employer] / Plan: [facility] COMPLETE HMO / Product Type: *No Product type* /                     Patient  verified yes     Visit #   Current  / Total 17 20   Time   In / Out 8:44A 10:00A   Total Treatment Time 76   Total Timed Codes 58   1:1 Treatment Time 58      [facility] Totals Reminder:  bill using total billable   min of TIMED therapeutic procedures and modalities.   8-22 min = 1 unit; 23-37 min = 2 units; 38-52 min = 3 units; 53-67 min = 4 units; 68-82 min = 5 units            SUBJECTIVE    Pain Level (0-10 scale): 0/10    Any medication changes, allergies to medications, adverse drug reactions, diagnosis change, or new procedure performed?: [x] No    [] Yes (see summary sheet for update)  Medications: Verified on Patient Summary List    Subjective functional status/changes:     Pt reports she is still having pain at night and sleeping. Difficulty reaching behind her back, but all other daily function is going well. Feeling stronger.     OBJECTIVE      Therapeutic Procedures:    Tx Min Billable or 1:1 Min (if diff from Tx Min) Procedure, Rationale, Specifics   42 42 19682 Therapeutic Exercise (timed):  increase ROM, strength, coordination, balance, and proprioception to improve patient's ability to progress to PLOF and address remaining functional goals. (see flow sheet as applicable)     Details if applicable:  PROM into flexion, abduction and ER.    13 13 40772 Manual Therapy (timed):  decrease pain, increase ROM, increase tissue extensibility, decrease trigger points, and increase postural awareness to improve patient's ability to

## 2025-04-10 ENCOUNTER — HOSPITAL ENCOUNTER (OUTPATIENT)
Dept: PHYSICAL THERAPY | Facility: HOSPITAL | Age: 75
Setting detail: RECURRING SERIES
Discharge: HOME | End: 2025-04-13
Attending: INTERNAL MEDICINE
Payer: MEDICARE

## 2025-04-10 PROCEDURE — 97140 MANUAL THERAPY 1/> REGIONS: CPT

## 2025-04-10 PROCEDURE — 97110 THERAPEUTIC EXERCISES: CPT

## 2025-04-10 NOTE — PROGRESS NOTES
PHYSICAL THERAPY - MEDICARE DAILY TREATMENT NOTE (updated 3/23)      Date: 4/10/2025          Patient Name:  Rosalinda Elliott :  1950   Medical   Diagnosis:  Nontraumatic tear of right rotator cuff, unspecified tear extent [M75.101] Treatment Diagnosis:  M25.511  RIGHT SHOULDER PAIN    Referral Source:  Mark Weiss III, MD Insurance:   Payor: [employer] / Plan: [facility] COMPLETE HMO / Product Type: *No Product type* /                     Patient  verified yes     Visit #   Current  / Total 17 20   Time   In / Out 8:45A 9:45A   Total Treatment Time 60   Total Timed Codes 42   1:1 Treatment Time 42       [facility] Totals Reminder:  bill using total billable   min of TIMED therapeutic procedures and modalities.   8-22 min = 1 unit; 23-37 min = 2 units; 38-52 min = 3 units; 53-67 min = 4 units; 68-82 min = 5 units            SUBJECTIVE    Pain Level (0-10 scale): 0/10    Any medication changes, allergies to medications, adverse drug reactions, diagnosis change, or new procedure performed?: [x] No    [] Yes (see summary sheet for update)  Medications: Verified on Patient Summary List    Subjective functional status/changes:     Pt reports she is still having pain at night and sleeping. Difficulty reaching behind her back, but all other daily function is going well. Feeling stronger.     OBJECTIVE      Therapeutic Procedures:    Tx Min Billable or 1:1 Min (if diff from Tx Min) Procedure, Rationale, Specifics   32 32 72753 Therapeutic Exercise (timed):  increase ROM, strength, coordination, balance, and proprioception to improve patient's ability to progress to PLOF and address remaining functional goals. (see flow sheet as applicable)     Details if applicable:  PROM into flexion, abduction and ER.    10 10 40340 Manual Therapy (timed):  decrease pain, increase ROM, increase tissue extensibility, decrease trigger points, and increase postural awareness to improve patient's ability to

## 2025-04-16 ENCOUNTER — HOSPITAL ENCOUNTER (OUTPATIENT)
Dept: PHYSICAL THERAPY | Facility: HOSPITAL | Age: 75
Setting detail: RECURRING SERIES
Discharge: HOME | End: 2025-04-19
Attending: INTERNAL MEDICINE
Payer: MEDICARE

## 2025-04-16 PROCEDURE — 97140 MANUAL THERAPY 1/> REGIONS: CPT

## 2025-04-16 PROCEDURE — 97110 THERAPEUTIC EXERCISES: CPT

## 2025-04-16 NOTE — PROGRESS NOTES
PHYSICAL THERAPY - MEDICARE DAILY TREATMENT NOTE (updated 3/23)      Date: 2025          Patient Name:  Rosalinda Elliott :  1950   Medical   Diagnosis:  Nontraumatic tear of right rotator cuff, unspecified tear extent [M75.101] Treatment Diagnosis:  M25.511  RIGHT SHOULDER PAIN    Referral Source:  Mark Weiss III, MD Insurance:   Payor: [employer] / Plan: [facility] COMPLETE HMO / Product Type: *No Product type* /                     Patient  verified yes     Visit #   Current  / Total 18 20   Time   In / Out 8:30A 9:30A   Total Treatment Time 60   Total Timed Codes 42   1:1 Treatment Time 42       [facility] Totals Reminder:  bill using total billable   min of TIMED therapeutic procedures and modalities.   8-22 min = 1 unit; 23-37 min = 2 units; 38-52 min = 3 units; 53-67 min = 4 units; 68-82 min = 5 units            SUBJECTIVE    Pain Level (0-10 scale): 0/10    Any medication changes, allergies to medications, adverse drug reactions, diagnosis change, or new procedure performed?: [x] No    [] Yes (see summary sheet for update)  Medications: Verified on Patient Summary List    Subjective functional status/changes:     Pt reports she is still having pain at night and sleeping.   OBJECTIVE      Therapeutic Procedures:    Tx Min Billable or 1:1 Min (if diff from Tx Min) Procedure, Rationale, Specifics   32 68 [id number] Therapeutic Exercise (timed):  increase ROM, strength, coordination, balance, and proprioception to improve patient's ability to progress to PLOF and address remaining functional goals. (see flow sheet as applicable)     Details if applicable:  PROM into flexion, abduction and ER.    10 41 22780 Manual Therapy (timed):  decrease pain, increase ROM, increase tissue extensibility, decrease trigger points, and increase postural awareness to improve patient's ability to progress to PLOF and address remaining functional goals.  The manual therapy interventions were performed

## 2025-04-23 ENCOUNTER — HOSPITAL ENCOUNTER (OUTPATIENT)
Dept: PHYSICAL THERAPY | Facility: HOSPITAL | Age: 75
Setting detail: RECURRING SERIES
Discharge: HOME | End: 2025-04-26
Attending: INTERNAL MEDICINE
Payer: MEDICARE

## 2025-04-23 PROCEDURE — 97140 MANUAL THERAPY 1/> REGIONS: CPT

## 2025-04-23 PROCEDURE — 97110 THERAPEUTIC EXERCISES: CPT

## 2025-04-23 NOTE — PROGRESS NOTES
[facility], [facility],   a part of [address], [address]  [place] [address]  Phone: [phone]  Fax: [phone]      PHYSICAL THERAPY PROGRESS NOTE  Patient Name:  Rosalinda Elliott :  1950   Treatment/Medical Diagnosis: Nontraumatic tear of right rotator cuff, unspecified tear extent [M75.101]   Referral Source:  Mark Weiss III, MD     Date of Initial Visit:  1/15/25 14  Missed Visits:  Weather related 3     SUMMARY OF TREATMENT/ASSESSMENT:  The pt has been seen for 20 sessions in PT and is  making good progress with improvement in pain levels, full PROM, pain free AROM and progression into gradual scapular, biceps and triceps strengthening as appropriate. She is starting to have less pain at night and functional IR is improving to L2.  Plan to progress as she tolerates progressing to RTC strengthening to maximize return to daily activities.     Right Shoulder ROM:           PROM      AROM              Flexion                         175(120)          170 (168)                Abduction                    175(90)             175 (90)                                                                                            ER                                 90 (60)            T4  IR   70 (60)          L 2 (Back pocket)     Strength:   Flexion: 4/5  Abduction:4/5  IR:5/5  ER: 4/5    CURRENT STATUS/GOALS:  Short Term Goals: To be accomplished in 12 treatments.  The patient will be independent with introductory HEP with no v.c. or tactile cuing by PT needed MET  The patient will improve AROM Right shoulder flexion AROM to 130 deg to improve ease with overhead reaching tasks MET  The patient will demonstrate full PROM in all planes of motion in order to progress to strengthening per protocol.  MET  The pt is able to sleep without pain for > 4 hours MET     Long Term Goals: To be accomplished in 24 treatments.  The patient will demonstrate pain 
address remaining functional goals.  The manual therapy interventions were performed at a separate and distinct time from the therapeutic activities interventions . (see flow sheet as applicable)    Details if applicable:   right GH posterior mobilization with and without movement. STM to UT, pectoralis. Scapular mobilizations in sidelying.                   40 40    Total Total         Modalities Rationale:     decrease inflammation, decrease pain, and increase tissue extensibility to improve patient's ability to progress to PLOF and address remaining functional goals.       min [] Estim Unattended,             type/location:       []  w/ice    []  w/heat        min [] Estim Attended,             type/location:       []  w/ice   []  w/heat         []  w/US   []  TENS insruct            min []  Mechanical Traction,        type/lbs:        []  pro      []  sup           []  int       []  cont            []  before manual           []  after manual     min []  Ultrasound,         settings/location:     10 pre/10 post min  unbilled [x]  Ice     [x]  Heat            location/position:sitting          min []  Vasopneumatic Device,      press/temp: 34 low     If using vaso (only need to measure limb vaso being performed on)        min []  Other:      Skin assessment post-treatment (if applicable):    [x]  intact    []  redness- no adverse reaction                 []redness - adverse reaction:          [x]  Patient Education billed concurrently with other procedures   [x] Review HEP    [] Progressed/Changed HEP, detail:    [] Other detail:         Other Objective/Functional Measures     Right Shoulder ROM:           PROM            AROM              Flexion                         175(120)          170 (168)                Abduction                    175(90)             175 (90)                                                                                               ER                                 90 (60)

## 2025-05-01 ENCOUNTER — HOSPITAL ENCOUNTER (OUTPATIENT)
Dept: PHYSICAL THERAPY | Facility: HOSPITAL | Age: 75
Setting detail: RECURRING SERIES
Discharge: HOME | End: 2025-05-04
Attending: INTERNAL MEDICINE
Payer: MEDICARE

## 2025-05-01 PROCEDURE — 97110 THERAPEUTIC EXERCISES: CPT

## 2025-05-01 PROCEDURE — 97140 MANUAL THERAPY 1/> REGIONS: CPT

## 2025-05-01 NOTE — PROGRESS NOTES
PHYSICAL THERAPY - MEDICARE DAILY TREATMENT NOTE (updated 3/23)      Date: 2025          Patient Name:  Rosalinda Elliott :  1950   Medical   Diagnosis:  Nontraumatic tear of right rotator cuff, unspecified tear extent [M75.101] Treatment Diagnosis:  M25.511  RIGHT SHOULDER PAIN    Referral Source:  Mark Weiss III, MD Insurance:   Payor: [employer] / Plan: [facility] COMPLETE HMO / Product Type: *No Product type* /                     Patient  verified yes     Visit #   Current  / Total 20 20   Time   In / Out 12:15p 1:15p   Total Treatment Time 60   Total Timed Codes 40   1:1 Treatment Time 40       [facility] Totals Reminder:  bill using total billable   min of TIMED therapeutic procedures and modalities.   8-22 min = 1 unit; 23-37 min = 2 units; 38-52 min = 3 units; 53-67 min = 4 units; 68-82 min = 5 units            SUBJECTIVE    Pain Level (0-10 scale): /10    Any medication changes, allergies to medications, adverse drug reactions, diagnosis change, or new procedure performed?: [x] No    [] Yes (see summary sheet for update)  Medications: Verified on Patient Summary List    Subjective functional status/changes:     Pt reports she is doing a little better with ROM. Hasn't had time to do her exercises this week due to company. Aching and painful at night.     OBJECTIVE    Therapeutic Procedures:    Tx Min Billable or 1:1 Min (if diff from Tx Min) Procedure, Rationale, Specifics   30 30 55784 Therapeutic Exercise (timed):  increase ROM, strength, coordination, balance, and proprioception to improve patient's ability to progress to PLOF and address remaining functional goals. (see flow sheet as applicable)     Details if applicable:  PROM into flexion, abduction and ER.    10 10 07154 Manual Therapy (timed):  decrease pain, increase ROM, increase tissue extensibility, decrease trigger points, and increase postural awareness to improve patient's ability to progress to PLOF and

## 2025-05-09 ENCOUNTER — HOSPITAL ENCOUNTER (OUTPATIENT)
Dept: PHYSICAL THERAPY | Facility: HOSPITAL | Age: 75
Setting detail: RECURRING SERIES
Discharge: HOME | End: 2025-05-12
Attending: INTERNAL MEDICINE
Payer: MEDICARE

## 2025-05-09 PROCEDURE — 97110 THERAPEUTIC EXERCISES: CPT

## 2025-05-09 PROCEDURE — 97140 MANUAL THERAPY 1/> REGIONS: CPT

## 2025-05-09 NOTE — PROGRESS NOTES
PHYSICAL THERAPY - MEDICARE DAILY TREATMENT NOTE (updated 3/23)      Date: 2025          Patient Name:  Rosalinda Elliott :  1950   Medical   Diagnosis:  Nontraumatic tear of right rotator cuff, unspecified tear extent [M75.101] Treatment Diagnosis:  M25.511  RIGHT SHOULDER PAIN    Referral Source:  Mark Weiss III, MD Insurance:   Payor: [employer] / Plan: [facility] COMPLETE HMO / Product Type: *No Product type* /                     Patient  verified yes     Visit #   Current  / Total 22 30   Time   In / Out 7:41A 8:43A   Total Treatment Time 62   Total Timed Codes 42   1:1 Treatment Time 42       [facility] Totals Reminder:  bill using total billable   min of TIMED therapeutic procedures and modalities.   8-22 min = 1 unit; 23-37 min = 2 units; 38-52 min = 3 units; 53-67 min = 4 units; 68-82 min = 5 units            SUBJECTIVE    Pain Level (0-10 scale):0/10    Any medication changes, allergies to medications, adverse drug reactions, diagnosis change, or new procedure performed?: [x] No    [] Yes (see summary sheet for update)  Medications: Verified on Patient Summary List    Subjective functional status/changes:     Pt reports she is doing a little better each week.       OBJECTIVE    Therapeutic Procedures:    Tx Min Billable or 1:1 Min (if diff from Tx Min) Procedure, Rationale, Specifics   32 15 03030 Therapeutic Exercise (timed):  increase ROM, strength, coordination, balance, and proprioception to improve patient's ability to progress to PLOF and address remaining functional goals. (see flow sheet as applicable)     Details if applicable:  PROM into flexion, abduction and ER.    10 10 35891 Manual Therapy (timed):  decrease pain, increase ROM, increase tissue extensibility, decrease trigger points, and increase postural awareness to improve patient's ability to progress to PLOF and address remaining functional goals.  The manual therapy interventions were performed at a

## 2025-05-14 ENCOUNTER — HOSPITAL ENCOUNTER (OUTPATIENT)
Dept: PHYSICAL THERAPY | Facility: HOSPITAL | Age: 75
Setting detail: RECURRING SERIES
Discharge: HOME | End: 2025-05-17
Attending: INTERNAL MEDICINE
Payer: MEDICARE

## 2025-05-14 PROCEDURE — 97110 THERAPEUTIC EXERCISES: CPT

## 2025-05-14 PROCEDURE — 97140 MANUAL THERAPY 1/> REGIONS: CPT

## 2025-05-14 NOTE — PROGRESS NOTES
PHYSICAL THERAPY - MEDICARE DAILY TREATMENT NOTE (updated 3/23)      Date: 2025          Patient Name:  Rosalinda Elliott :  1950   Medical   Diagnosis:  Nontraumatic tear of right rotator cuff, unspecified tear extent [M75.101] Treatment Diagnosis:  M25.511  RIGHT SHOULDER PAIN    Referral Source:  Mark Weiss III, MD Insurance:   Payor: [employer] / Plan: [facility] COMPLETE HMO / Product Type: *No Product type* /                     Patient  verified yes     Visit #   Current  / Total 23 30   Time   In / Out 10:15A 11:15A   Total Treatment Time 62   Total Timed Codes 42   1:1 Treatment Time 42       BC Totals Reminder:  bill using total billable   min of TIMED therapeutic procedures and modalities.   8-22 min = 1 unit; 23-37 min = 2 units; 38-52 min = 3 units; 53-67 min = 4 units; 68-82 min = 5 units            SUBJECTIVE    Pain Level (0-10 scale): 0/10    Any medication changes, allergies to medications, adverse drug reactions, diagnosis change, or new procedure performed?: [x] No    [] Yes (see summary sheet for update)  Medications: Verified on Patient Summary List    Subjective functional status/changes:     Pt reports she is doing better. Still feeling some achiness in the shoulder at night.       OBJECTIVE    Therapeutic Procedures:    Tx Min Billable or 1:1 Min (if diff from Tx Min) Procedure, Rationale, Specifics   32 65 45607 Therapeutic Exercise (timed):  increase ROM, strength, coordination, balance, and proprioception to improve patient's ability to progress to PLOF and address remaining functional goals. (see flow sheet as applicable)     Details if applicable:  PROM into flexion, abduction and ER.    10 99 16763 Manual Therapy (timed):  decrease pain, increase ROM, increase tissue extensibility, decrease trigger points, and increase postural awareness to improve patient's ability to progress to PLOF and address remaining functional goals.  The manual

## 2025-05-14 NOTE — PROGRESS NOTES
PHYSICAL THERAPY - MEDICARE DAILY TREATMENT NOTE (updated 3/23)      Date: 2025          Patient Name:  Rosalinda Elliott :  1950   Medical   Diagnosis:  Nontraumatic tear of right rotator cuff, unspecified tear extent [M75.101] Treatment Diagnosis:  M25.511  RIGHT SHOULDER PAIN    Referral Source:  Mark Weiss III, MD Insurance:   Payor: [employer] / Plan: [facility] COMPLETE HMO / Product Type: *No Product type* /                     Patient  verified yes     Visit #   Current  / Total 23 30   Time   In / Out 10:15A 11:15A   Total Treatment Time 60   Total Timed Codes 40   1:1 Treatment Time 40      [facility] Totals Reminder:  bill using total billable   min of TIMED therapeutic procedures and modalities.   8-22 min = 1 unit; 23-37 min = 2 units; 38-52 min = 3 units; 53-67 min = 4 units; 68-82 min = 5 units            SUBJECTIVE    Pain Level (0-10 scale):0/10    Any medication changes, allergies to medications, adverse drug reactions, diagnosis change, or new procedure performed?: [x] No    [] Yes (see summary sheet for update)  Medications: Verified on Patient Summary List    Subjective functional status/changes:     Pt reports she is doing a little better each week.       OBJECTIVE    Therapeutic Procedures:    Tx Min Billable or 1:1 Min (if diff from Tx Min) Procedure, Rationale, Specifics   30 30 66095 Therapeutic Exercise (timed):  increase ROM, strength, coordination, balance, and proprioception to improve patient's ability to progress to PLOF and address remaining functional goals. (see flow sheet as applicable)     Details if applicable:  PROM into flexion, abduction and ER.    10 10 25699 Manual Therapy (timed):  decrease pain, increase ROM, increase tissue extensibility, decrease trigger points, and increase postural awareness to improve patient's ability to progress to PLOF and address remaining functional goals.  The manual therapy interventions were performed at a

## 2025-05-16 ENCOUNTER — HOSPITAL ENCOUNTER (OUTPATIENT)
Dept: PHYSICAL THERAPY | Facility: HOSPITAL | Age: 75
Setting detail: RECURRING SERIES
Discharge: HOME | End: 2025-05-19
Attending: INTERNAL MEDICINE
Payer: MEDICARE

## 2025-05-16 PROCEDURE — 97110 THERAPEUTIC EXERCISES: CPT

## 2025-05-16 PROCEDURE — 97140 MANUAL THERAPY 1/> REGIONS: CPT

## 2025-05-16 NOTE — PROGRESS NOTES
PHYSICAL THERAPY - MEDICARE DAILY TREATMENT NOTE (updated 3/23)      Date: 2025          Patient Name:  Rosalinda Elliott :  1950   Medical   Diagnosis:  Nontraumatic tear of right rotator cuff, unspecified tear extent [M75.101] Treatment Diagnosis:  M25.511  RIGHT SHOULDER PAIN    Referral Source:  Mark Weiss III, MD Insurance:   Payor: [employer] / Plan: [facility] COMPLETE HMO / Product Type: *No Product type* /                     Patient  verified yes     Visit #   Current  / Total 24 30   Time   In / Out 8:30A 9:30   Total Treatment Time 60   Total Timed Codes 40   1:1 Treatment Time 40      [facility] Totals Reminder:  bill using total billable   min of TIMED therapeutic procedures and modalities.   8-22 min = 1 unit; 23-37 min = 2 units; 38-52 min = 3 units; 53-67 min = 4 units; 68-82 min = 5 units            SUBJECTIVE    Pain Level (0-10 scale):0/10    Any medication changes, allergies to medications, adverse drug reactions, diagnosis change, or new procedure performed?: [x] No    [] Yes (see summary sheet for update)  Medications: Verified on Patient Summary List    Subjective functional status/changes:     Pt reports she had increased pain last night. Also reports she did not do any stretches yesterday.       OBJECTIVE    Therapeutic Procedures:    Tx Min Billable or 1:1 Min (if diff from Tx Min) Procedure, Rationale, Specifics   30 30 52003 Therapeutic Exercise (timed):  increase ROM, strength, coordination, balance, and proprioception to improve patient's ability to progress to PLOF and address remaining functional goals. (see flow sheet as applicable)     Details if applicable:  PROM into flexion, abduction and ER.    10 10 95503 Manual Therapy (timed):  decrease pain, increase ROM, increase tissue extensibility, decrease trigger points, and increase postural awareness to improve patient's ability to progress to PLOF and address remaining functional goals.  The

## 2025-05-21 ENCOUNTER — HOSPITAL ENCOUNTER (OUTPATIENT)
Dept: PHYSICAL THERAPY | Facility: HOSPITAL | Age: 75
Setting detail: RECURRING SERIES
Discharge: HOME | End: 2025-05-24
Attending: INTERNAL MEDICINE
Payer: MEDICARE

## 2025-05-21 PROCEDURE — 97140 MANUAL THERAPY 1/> REGIONS: CPT

## 2025-05-21 PROCEDURE — 97110 THERAPEUTIC EXERCISES: CPT

## 2025-05-21 PROCEDURE — 97112 NEUROMUSCULAR REEDUCATION: CPT

## 2025-05-21 NOTE — PROGRESS NOTES
progressing               Frequency / Duration: Patient to be seen 2 times per week for 24 treatments.      PLAN  Yes  Continue plan of care  Re-Cert Due: 64 units allowed per insurance, reassess after 10th sesssion  []  Upgrade activities as tolerated  []  Discharge due to:  []  Other:      Flor Rutherford, PT       5/21/2025       10:19 AM

## 2025-05-23 ENCOUNTER — HOSPITAL ENCOUNTER (OUTPATIENT)
Dept: PHYSICAL THERAPY | Facility: HOSPITAL | Age: 75
Setting detail: RECURRING SERIES
Discharge: HOME | End: 2025-05-26
Attending: INTERNAL MEDICINE
Payer: MEDICARE

## 2025-05-23 PROCEDURE — 97140 MANUAL THERAPY 1/> REGIONS: CPT

## 2025-05-23 PROCEDURE — 97110 THERAPEUTIC EXERCISES: CPT

## 2025-05-23 NOTE — PROGRESS NOTES
PHYSICAL THERAPY - MEDICARE DAILY TREATMENT NOTE (updated 3/23)      Date: 2025          Patient Name:  Rosalinda Elliott :  1950   Medical   Diagnosis:  Nontraumatic tear of right rotator cuff, unspecified tear extent [M75.101] Treatment Diagnosis:  M25.511  RIGHT SHOULDER PAIN    Referral Source:  Mark Weiss III, MD Insurance:   Payor: [employer] / Plan: [facility] COMPLETE HMO / Product Type: *No Product type* /                     Patient  verified yes     Visit #   Current  / Total 26 30   Time   In / Out 8:30A 9:28A   Total Treatment Time 53   Total Timed Codes 43   1:1 Treatment Time 38      [facility] Totals Reminder:  bill using total billable   min of TIMED therapeutic procedures and modalities.   8-22 min = 1 unit; 23-37 min = 2 units; 38-52 min = 3 units; 53-67 min = 4 units; 68-82 min = 5 units            SUBJECTIVE    Pain Level (0-10 scale): 0/10    Any medication changes, allergies to medications, adverse drug reactions, diagnosis change, or new procedure performed?: [x] No    [] Yes (see summary sheet for update)  Medications: Verified on Patient Summary List    Subjective functional status/changes:     Pt reports she has felt better since last session.     OBJECTIVE    Therapeutic Procedures:    Tx Min Billable or 1:1 Min (if diff from Tx Min) Procedure, Rationale, Specifics   33 41 17960 Therapeutic Exercise (timed):  increase ROM, strength, coordination, balance, and proprioception to improve patient's ability to progress to PLOF and address remaining functional goals. (see flow sheet as applicable)     Details if applicable:  PROM into flexion, abduction and ER.    10 10 73703 Manual Therapy (timed):  decrease pain, increase ROM, increase tissue extensibility, decrease trigger points, and increase postural awareness to improve patient's ability to progress to PLOF and address remaining functional goals.  The manual therapy interventions were performed at a

## 2025-05-28 ENCOUNTER — HOSPITAL ENCOUNTER (OUTPATIENT)
Dept: PHYSICAL THERAPY | Facility: HOSPITAL | Age: 75
Setting detail: RECURRING SERIES
Discharge: HOME | End: 2025-05-31
Attending: INTERNAL MEDICINE
Payer: MEDICARE

## 2025-05-28 PROCEDURE — 97110 THERAPEUTIC EXERCISES: CPT

## 2025-05-28 PROCEDURE — 97140 MANUAL THERAPY 1/> REGIONS: CPT

## 2025-05-28 NOTE — PROGRESS NOTES
PHYSICAL THERAPY - MEDICARE DAILY TREATMENT NOTE (updated 3/23)      Date: 2025          Patient Name:  Rosalinda Elliott :  1950   Medical   Diagnosis:  Nontraumatic tear of right rotator cuff, unspecified tear extent [M75.101] Treatment Diagnosis:  M25.511  RIGHT SHOULDER PAIN    Referral Source:  Mark Weiss III, MD Insurance:   Payor: [employer] / Plan: [facility] COMPLETE HMO / Product Type: *No Product type* /                     Patient  verified yes     Visit #   Current  / Total 27 30   Time   In / Out 8:30A 9:30A   Total Treatment Time 60   Total Timed Codes 50   1:1 Treatment Time 45      [facility] Totals Reminder:  bill using total billable   min of TIMED therapeutic procedures and modalities.   8-22 min = 1 unit; 23-37 min = 2 units; 38-52 min = 3 units; 53-67 min = 4 units; 68-82 min = 5 units            SUBJECTIVE    Pain Level (0-10 scale): 0/10    Any medication changes, allergies to medications, adverse drug reactions, diagnosis change, or new procedure performed?: [x] No    [] Yes (see summary sheet for update)  Medications: Verified on Patient Summary List    Subjective functional status/changes:     Pt reports she has been sore the past 2-3 days.     OBJECTIVE    Therapeutic Procedures:    Tx Min Billable or 1:1 Min (if diff from Tx Min) Procedure, Rationale, Specifics   40 [phone] [id number] Therapeutic Exercise (timed):  increase ROM, strength, coordination, balance, and proprioception to improve patient's ability to progress to PLOF and address remaining functional goals. (see flow sheet as applicable)     Details if applicable:  PROM into flexion, abduction and ER.    10 [phone] [id number] Manual Therapy (timed):  decrease pain, increase ROM, increase tissue extensibility, decrease trigger points, and increase postural awareness to improve patient's ability to progress to PLOF and address remaining functional goals.  The manual therapy interventions were performed at a

## 2025-05-30 ENCOUNTER — HOSPITAL ENCOUNTER (OUTPATIENT)
Dept: PHYSICAL THERAPY | Facility: HOSPITAL | Age: 75
Setting detail: RECURRING SERIES
End: 2025-05-30
Attending: INTERNAL MEDICINE
Payer: MEDICARE

## 2025-05-30 PROCEDURE — 97112 NEUROMUSCULAR REEDUCATION: CPT

## 2025-05-30 PROCEDURE — 97110 THERAPEUTIC EXERCISES: CPT

## 2025-05-30 PROCEDURE — 97140 MANUAL THERAPY 1/> REGIONS: CPT

## 2025-05-30 NOTE — PROGRESS NOTES
PHYSICAL THERAPY - MEDICARE DAILY TREATMENT NOTE (updated 3/23)      Date: 2025          Patient Name:  Rosalinda Elliott :  1950   Medical   Diagnosis:  Nontraumatic tear of right rotator cuff, unspecified tear extent [M75.101] Treatment Diagnosis:  M25.511  RIGHT SHOULDER PAIN    Referral Source:  Mark Weiss III, MD Insurance:   Payor: [employer] / Plan: [facility] COMPLETE HMO / Product Type: *No Product type* /                     Patient  verified yes     Visit #   Current  / Total 28 30   Time   In / Out 8:30A 9:30A   Total Treatment Time 60   Total Timed Codes 50   1:1 Treatment Time 45      [facility] Totals Reminder:  bill using total billable   min of TIMED therapeutic procedures and modalities.   8-22 min = 1 unit; 23-37 min = 2 units; 38-52 min = 3 units; 53-67 min = 4 units; 68-82 min = 5 units            SUBJECTIVE    Pain Level (0-10 scale): 0/10    Any medication changes, allergies to medications, adverse drug reactions, diagnosis change, or new procedure performed?: [x] No    [] Yes (see summary sheet for update)  Medications: Verified on Patient Summary List    Subjective functional status/changes:     Pt reports she has been sore the past 2-3 days.     OBJECTIVE    Therapeutic Procedures:    Tx Min Billable or 1:1 Min (if diff from Tx Min) Procedure, Rationale, Specifics   [phone] [id number] Therapeutic Exercise (timed):  increase ROM, strength, coordination, balance, and proprioception to improve patient's ability to progress to PLOF and address remaining functional goals. (see flow sheet as applicable)     Details if applicable:  PROM into flexion, abduction and ER.    10 [phone] [id number] Manual Therapy (timed):  decrease pain, increase ROM, increase tissue extensibility, decrease trigger points, and increase postural awareness to improve patient's ability to progress to PLOF and address remaining functional goals.  The manual therapy interventions were performed at a

## 2025-06-02 ENCOUNTER — HOSPITAL ENCOUNTER (OUTPATIENT)
Dept: PHYSICAL THERAPY | Facility: HOSPITAL | Age: 75
Setting detail: RECURRING SERIES
Discharge: HOME | End: 2025-06-05
Attending: INTERNAL MEDICINE
Payer: MEDICARE

## 2025-06-02 PROCEDURE — 97110 THERAPEUTIC EXERCISES: CPT

## 2025-06-02 PROCEDURE — 97140 MANUAL THERAPY 1/> REGIONS: CPT

## 2025-06-02 NOTE — PROGRESS NOTES
PHYSICAL THERAPY - MEDICARE DAILY TREATMENT NOTE (updated 3/23)      Date: 2025          Patient Name:  Rosalinda Elliott :  1950   Medical   Diagnosis:  Nontraumatic tear of right rotator cuff, unspecified tear extent [M75.101] Treatment Diagnosis:  M25.511  RIGHT SHOULDER PAIN    Referral Source:  Mark Weiss III, MD Insurance:   Payor: [employer] / Plan: [facility] COMPLETE HMO / Product Type: *No Product type* /                     Patient  verified yes     Visit #   Current  / Total 29 30   Time   In / Out 7:40A 8:38A   Total Treatment Time 58   Total Timed Codes 48   1:1 Treatment Time 43      [facility] Totals Reminder:  bill using total billable   min of TIMED therapeutic procedures and modalities.   8-22 min = 1 unit; 23-37 min = 2 units; 38-52 min = 3 units; 53-67 min = 4 units; 68-82 min = 5 units            SUBJECTIVE    Pain Level (0-10 scale): 0/10    Any medication changes, allergies to medications, adverse drug reactions, diagnosis change, or new procedure performed?: [x] No    [] Yes (see summary sheet for update)  Medications: Verified on Patient Summary List    Subjective functional status/changes:     Pt reports she is doing well.     OBJECTIVE    Therapeutic Procedures:    Tx Min Billable or 1:1 Min (if diff from Tx Min) Procedure, Rationale, Specifics   38 [phone] [id number] Therapeutic Exercise (timed):  increase ROM, strength, coordination, balance, and proprioception to improve patient's ability to progress to PLOF and address remaining functional goals. (see flow sheet as applicable)     Details if applicable:  PROM into flexion, abduction and ER.    10 [phone] [id number] Manual Therapy (timed):  decrease pain, increase ROM, increase tissue extensibility, decrease trigger points, and increase postural awareness to improve patient's ability to progress to PLOF and address remaining functional goals.  The manual therapy interventions were performed at a separate and distinct

## 2025-06-04 ENCOUNTER — HOSPITAL ENCOUNTER (OUTPATIENT)
Dept: PHYSICAL THERAPY | Facility: HOSPITAL | Age: 75
Setting detail: RECURRING SERIES
Discharge: HOME | End: 2025-06-07
Attending: INTERNAL MEDICINE
Payer: MEDICARE

## 2025-06-04 PROCEDURE — 97110 THERAPEUTIC EXERCISES: CPT

## 2025-06-04 PROCEDURE — 97140 MANUAL THERAPY 1/> REGIONS: CPT

## 2025-06-04 NOTE — PROGRESS NOTES
PHYSICAL THERAPY - MEDICARE DAILY TREATMENT NOTE (updated 3/23)      Date: 2025          Patient Name:  Rosalinda Elliott :  1950   Medical   Diagnosis:  Nontraumatic tear of right rotator cuff, unspecified tear extent [M75.101] Treatment Diagnosis:  M25.511  RIGHT SHOULDER PAIN    Referral Source:  Mark Weiss III, MD Insurance:   Payor: [employer] / Plan: [facility] COMPLETE HMO / Product Type: *No Product type* /                     Patient  verified yes     Visit #   Current  / Total 30 32   Time   In / Out 7:41A 8:42A   Total Treatment Time 61   Total Timed Codes 51   1:1 Treatment Time 46      [facility] Totals Reminder:  bill using total billable   min of TIMED therapeutic procedures and modalities.   8-22 min = 1 unit; 23-37 min = 2 units; 38-52 min = 3 units; 53-67 min = 4 units; 68-82 min = 5 units            SUBJECTIVE    Pain Level (0-10 scale): 0/10    Any medication changes, allergies to medications, adverse drug reactions, diagnosis change, or new procedure performed?: [x] No    [] Yes (see summary sheet for update)  Medications: Verified on Patient Summary List    Subjective functional status/changes:     Pt reports she is doing well. A little sore in the shoulder, no pain.     OBJECTIVE    Therapeutic Procedures:     Tx Min Billable or 1:1 Min (if diff from Tx Min) Procedure, Rationale, Specifics   [phone] [id number] Therapeutic Exercise (timed):  increase ROM, strength, coordination, balance, and proprioception to improve patient's ability to progress to PLOF and address remaining functional goals. (see flow sheet as applicable)     Details if applicable:  PROM into flexion, abduction and ER.    10 10 [id number] Manual Therapy (timed):  decrease pain, increase ROM, increase tissue extensibility, decrease trigger points, and increase postural awareness to improve patient's ability to progress to PLOF and address remaining functional goals.  The manual therapy interventions were

## 2025-06-09 ENCOUNTER — HOSPITAL ENCOUNTER (OUTPATIENT)
Dept: PHYSICAL THERAPY | Facility: HOSPITAL | Age: 75
Setting detail: RECURRING SERIES
Discharge: HOME | End: 2025-06-12
Attending: INTERNAL MEDICINE
Payer: MEDICARE

## 2025-06-09 PROCEDURE — 97140 MANUAL THERAPY 1/> REGIONS: CPT

## 2025-06-09 PROCEDURE — 97110 THERAPEUTIC EXERCISES: CPT

## 2025-06-09 NOTE — PROGRESS NOTES
4/5  Abduction:4/5  IR:5/5  ER: 4/5    Pain Level at end of session (0-10 scale): 0/10      Assessment:  The pt has 1 more session. Focused on form and encouraged her to slow down to ensure her posture and form is proper at home.   Patient will continue to benefit from skilled PT / OT services to modify and progress therapeutic interventions, analyze and address functional mobility deficits, analyze and address ROM deficits, analyze and address strength deficits, analyze and address soft tissue restrictions, analyze and cue for proper movement patterns, analyze and modify for postural abnormalities, and instruct in home and community integration to address functional deficits and attain remaining goals.       CURRENT STATUS/GOALS:  Short Term Goals: To be accomplished in 12 treatments.  The patient will be independent with introductory HEP with no v.c. or tactile cuing by PT needed MET  The patient will improve AROM Right shoulder flexion AROM to 130 deg to improve ease with overhead reaching tasks MET  The patient will demonstrate full PROM in all planes of motion in order to progress to strengthening per protocol.  MET  The pt is able to sleep without pain for > 4 hours MET     Long Term Goals: To be accomplished in 24 treatments.  The patient will demonstrate pain free shoulder AROM multidirectionally to improve ease with household chores such as dishes, laundry and cleaning tasks Progressing WEll  The patient will report ability to unload groceries from car into home without an increase in pain within the last 5 days progressing well  The patient will demonstrate a minimum of 4/5 strength of right shoulder to allow for UE endurance for fulling loading and transferring clothing from washer and dryer without rest breaks and without an increase in pain progressing well  The patient will demonstrate ability to lift 2 pounds overhead with pain level of 2/10 or less to improve ease of putting away dishes in kitchen

## 2025-06-11 ENCOUNTER — HOSPITAL ENCOUNTER (OUTPATIENT)
Dept: PHYSICAL THERAPY | Facility: HOSPITAL | Age: 75
Setting detail: RECURRING SERIES
Discharge: HOME | End: 2025-06-14
Attending: INTERNAL MEDICINE
Payer: MEDICARE

## 2025-06-11 PROCEDURE — 97110 THERAPEUTIC EXERCISES: CPT

## 2025-06-11 PROCEDURE — 97140 MANUAL THERAPY 1/> REGIONS: CPT

## 2025-06-11 PROCEDURE — 97112 NEUROMUSCULAR REEDUCATION: CPT

## 2025-06-11 NOTE — PROGRESS NOTES
PHYSICAL THERAPY - MEDICARE DAILY TREATMENT NOTE (updated 3/23)      Date: 2025          Patient Name:  Rosalinda Elliott :  1950   Medical   Diagnosis:  Nontraumatic tear of right rotator cuff, unspecified tear extent [M75.101] Treatment Diagnosis:  M25.511  RIGHT SHOULDER PAIN    Referral Source:  Mark Weiss III, MD Insurance:   Payor: [employer] / Plan: [facility] COMPLETE HMO / Product Type: *No Product type* /                     Patient  verified yes     Visit #   Current  / Total 32 32   Time   In / Out 7:45A 8:33A   Total Treatment Time 48   Total Timed Codes 48   1:1 Treatment Time 44      [facility] Totals Reminder:  bill using total billable   min of TIMED therapeutic procedures and modalities.   8-22 min = 1 unit; 23-37 min = 2 units; 38-52 min = 3 units; 53-67 min = 4 units; 68-82 min = 5 units            SUBJECTIVE    Pain Level (0-10 scale): 0/10    Any medication changes, allergies to medications, adverse drug reactions, diagnosis change, or new procedure performed?: [x] No    [] Yes (see summary sheet for update)  Medications: Verified on Patient Summary List    Subjective functional status/changes:     Pt reports she is able to do everything she needs to at home.  Still has some nights she has increased soreness in the shoulder.     OBJECTIVE:    Therapeutic Procedures:     Tx Min Billable or 1:1 Min (if diff from Tx Min) Procedure, Rationale, Specifics   40 [phone] [id number] Therapeutic Exercise (timed):  increase ROM, strength, coordination, balance, and proprioception to improve patient's ability to progress to PLOF and address remaining functional goals. (see flow sheet as applicable)     Details if applicable:  PROM into flexion, abduction and ER.    8 [phone] Manual Therapy (timed):  decrease pain, increase ROM, increase tissue extensibility, decrease trigger points, and increase postural awareness to improve patient's ability to progress to PLOF and address

## 2025-08-05 SDOH — HEALTH STABILITY: PHYSICAL HEALTH: ON AVERAGE, HOW MANY DAYS PER WEEK DO YOU ENGAGE IN MODERATE TO STRENUOUS EXERCISE (LIKE A BRISK WALK)?: 0 DAYS

## 2025-08-05 SDOH — ECONOMIC STABILITY: FOOD INSECURITY: WITHIN THE PAST 12 MONTHS, YOU WORRIED THAT YOUR FOOD WOULD RUN OUT BEFORE YOU GOT MONEY TO BUY MORE.: NEVER TRUE

## 2025-08-05 SDOH — HEALTH STABILITY: PHYSICAL HEALTH: ON AVERAGE, HOW MANY MINUTES DO YOU ENGAGE IN EXERCISE AT THIS LEVEL?: 10 MIN

## 2025-08-05 SDOH — ECONOMIC STABILITY: FOOD INSECURITY: WITHIN THE PAST 12 MONTHS, THE FOOD YOU BOUGHT JUST DIDN'T LAST AND YOU DIDN'T HAVE MONEY TO GET MORE.: NEVER TRUE

## 2025-08-05 SDOH — ECONOMIC STABILITY: INCOME INSECURITY: IN THE LAST 12 MONTHS, WAS THERE A TIME WHEN YOU WERE NOT ABLE TO PAY THE MORTGAGE OR RENT ON TIME?: NO

## 2025-08-05 SDOH — ECONOMIC STABILITY: TRANSPORTATION INSECURITY
IN THE PAST 12 MONTHS, HAS THE LACK OF TRANSPORTATION KEPT YOU FROM MEDICAL APPOINTMENTS OR FROM GETTING MEDICATIONS?: NO

## 2025-08-05 ASSESSMENT — LIFESTYLE VARIABLES
HOW OFTEN DO YOU HAVE SIX OR MORE DRINKS ON ONE OCCASION: 1
HOW OFTEN DO YOU HAVE A DRINK CONTAINING ALCOHOL: 1
HOW MANY STANDARD DRINKS CONTAINING ALCOHOL DO YOU HAVE ON A TYPICAL DAY: PATIENT DOES NOT DRINK
HOW MANY STANDARD DRINKS CONTAINING ALCOHOL DO YOU HAVE ON A TYPICAL DAY: 0
HOW OFTEN DO YOU HAVE A DRINK CONTAINING ALCOHOL: NEVER

## 2025-08-05 ASSESSMENT — PATIENT HEALTH QUESTIONNAIRE - PHQ9
SUM OF ALL RESPONSES TO PHQ QUESTIONS 1-9: 0
SUM OF ALL RESPONSES TO PHQ QUESTIONS 1-9: 0
2. FEELING DOWN, DEPRESSED OR HOPELESS: NOT AT ALL
SUM OF ALL RESPONSES TO PHQ QUESTIONS 1-9: 0
SUM OF ALL RESPONSES TO PHQ QUESTIONS 1-9: 0
1. LITTLE INTEREST OR PLEASURE IN DOING THINGS: NOT AT ALL

## 2025-08-06 ENCOUNTER — OFFICE VISIT (OUTPATIENT)
Dept: PRIMARY CARE CLINIC | Facility: CLINIC | Age: 75
End: 2025-08-06
Payer: MEDICARE

## 2025-08-06 VITALS
WEIGHT: 150.8 LBS | BODY MASS INDEX: 32.53 KG/M2 | HEIGHT: 57 IN | SYSTOLIC BLOOD PRESSURE: 136 MMHG | DIASTOLIC BLOOD PRESSURE: 85 MMHG | TEMPERATURE: 97.5 F | OXYGEN SATURATION: 100 % | RESPIRATION RATE: 17 BRPM | HEART RATE: 67 BPM

## 2025-08-06 DIAGNOSIS — M19.90 ARTHRITIS: ICD-10-CM

## 2025-08-06 DIAGNOSIS — M85.89 OSTEOPENIA OF MULTIPLE SITES: ICD-10-CM

## 2025-08-06 DIAGNOSIS — R79.89 ELEVATED SERUM CREATININE: ICD-10-CM

## 2025-08-06 DIAGNOSIS — E78.2 MIXED HYPERLIPIDEMIA: ICD-10-CM

## 2025-08-06 DIAGNOSIS — Z00.00 MEDICARE ANNUAL WELLNESS VISIT, SUBSEQUENT: Primary | ICD-10-CM

## 2025-08-06 DIAGNOSIS — K59.09 OTHER CONSTIPATION: ICD-10-CM

## 2025-08-06 DIAGNOSIS — M75.121 NONTRAUMATIC COMPLETE TEAR OF RIGHT ROTATOR CUFF: ICD-10-CM

## 2025-08-06 DIAGNOSIS — I10 PRIMARY HYPERTENSION: ICD-10-CM

## 2025-08-06 LAB
ALBUMIN SERPL-MCNC: 4.1 G/DL (ref 3.5–5.2)
ALBUMIN/GLOB SERPL: 1 (ref 1.1–2.2)
ALP SERPL-CCNC: 77 U/L (ref 35–104)
ALT SERPL-CCNC: 16 U/L (ref 10–35)
ANION GAP SERPL CALC-SCNC: 12 MMOL/L (ref 2–14)
AST SERPL-CCNC: 18 U/L (ref 10–35)
BILIRUB SERPL-MCNC: 0.7 MG/DL (ref 0–1.2)
BUN SERPL-MCNC: 17 MG/DL (ref 8–23)
BUN/CREAT SERPL: 17 (ref 12–20)
CALCIUM SERPL-MCNC: 10 MG/DL (ref 8.8–10.2)
CHLORIDE SERPL-SCNC: 102 MMOL/L (ref 98–107)
CHOLEST SERPL-MCNC: 200 MG/DL (ref 0–200)
CO2 SERPL-SCNC: 26 MMOL/L (ref 20–29)
CREAT SERPL-MCNC: 0.96 MG/DL (ref 0.6–1)
CRP SERPL HS-MCNC: 28.8 MG/L (ref 0–5)
ERYTHROCYTE [DISTWIDTH] IN BLOOD BY AUTOMATED COUNT: 11.9 % (ref 11.5–14.5)
GLOBULIN SER CALC-MCNC: 4.1 G/DL (ref 2–4)
GLUCOSE SERPL-MCNC: 97 MG/DL (ref 65–100)
HCT VFR BLD AUTO: 39.9 % (ref 35–47)
HDLC SERPL-MCNC: 50 MG/DL (ref 40–60)
HDLC SERPL: 4
HGB BLD-MCNC: 12.3 G/DL (ref 11.5–16)
LDLC SERPL CALC-MCNC: 129 MG/DL
MCH RBC QN AUTO: 27.6 PG (ref 26–34)
MCHC RBC AUTO-ENTMCNC: 30.8 G/DL (ref 30–36.5)
MCV RBC AUTO: 89.5 FL (ref 80–99)
NRBC # BLD: 0 K/UL (ref 0–0.01)
NRBC BLD-RTO: 0 PER 100 WBC
PLATELET # BLD AUTO: 373 K/UL (ref 150–400)
PMV BLD AUTO: 10.3 FL (ref 8.9–12.9)
POTASSIUM SERPL-SCNC: 4.1 MMOL/L (ref 3.5–5.1)
PROT SERPL-MCNC: 8.2 G/DL (ref 6.4–8.3)
RBC # BLD AUTO: 4.46 M/UL (ref 3.8–5.2)
SODIUM SERPL-SCNC: 140 MMOL/L (ref 136–145)
TRIGL SERPL-MCNC: 105 MG/DL (ref 0–150)
VLDLC SERPL CALC-MCNC: 21 MG/DL
WBC # BLD AUTO: 7.9 K/UL (ref 3.6–11)

## 2025-08-06 PROCEDURE — 99214 OFFICE O/P EST MOD 30 MIN: CPT | Performed by: INTERNAL MEDICINE

## 2025-08-06 PROCEDURE — 1159F MED LIST DOCD IN RCRD: CPT | Performed by: INTERNAL MEDICINE

## 2025-08-06 PROCEDURE — 1123F ACP DISCUSS/DSCN MKR DOCD: CPT | Performed by: INTERNAL MEDICINE

## 2025-08-06 PROCEDURE — 3079F DIAST BP 80-89 MM HG: CPT | Performed by: INTERNAL MEDICINE

## 2025-08-06 PROCEDURE — 3075F SYST BP GE 130 - 139MM HG: CPT | Performed by: INTERNAL MEDICINE

## 2025-08-06 PROCEDURE — G0439 PPPS, SUBSEQ VISIT: HCPCS | Performed by: INTERNAL MEDICINE

## 2025-08-06 PROCEDURE — 1160F RVW MEDS BY RX/DR IN RCRD: CPT | Performed by: INTERNAL MEDICINE

## 2025-08-06 RX ORDER — AMLODIPINE BESYLATE 10 MG/1
10 TABLET ORAL DAILY
Qty: 90 TABLET | Refills: 1 | Status: SHIPPED | OUTPATIENT
Start: 2025-08-06

## 2025-08-06 RX ORDER — PREDNISONE 20 MG/1
20 TABLET ORAL DAILY
Qty: 5 TABLET | Refills: 0 | Status: SHIPPED | OUTPATIENT
Start: 2025-08-06 | End: 2025-08-11

## 2025-08-06 RX ORDER — LISINOPRIL 10 MG/1
10 TABLET ORAL DAILY
Qty: 90 TABLET | Refills: 1 | Status: SHIPPED | OUTPATIENT
Start: 2025-08-06

## 2025-08-13 DIAGNOSIS — M35.3 POLYMYALGIA RHEUMATICA: Primary | ICD-10-CM

## 2025-08-13 RX ORDER — PREDNISONE 20 MG/1
20 TABLET ORAL DAILY
Qty: 10 TABLET | Refills: 0 | Status: SHIPPED | OUTPATIENT
Start: 2025-08-13 | End: 2025-08-23

## 2025-08-28 ENCOUNTER — TELEPHONE (OUTPATIENT)
Dept: PRIMARY CARE CLINIC | Facility: CLINIC | Age: 75
End: 2025-08-28

## 2025-09-02 DIAGNOSIS — M19.90 ARTHRITIS: Primary | ICD-10-CM

## 2025-09-02 DIAGNOSIS — M62.89 MUSCLE STIFFNESS: ICD-10-CM

## (undated) DEVICE — Device

## (undated) DEVICE — INFECTION CONTROL KIT SYS

## (undated) DEVICE — REM POLYHESIVE ADULT PATIENT RETURN ELECTRODE: Brand: VALLEYLAB

## (undated) DEVICE — SOLIDIFIER MEDC 1200ML -- CONVERT TO 356117

## (undated) DEVICE — TOWEL 4 PLY TISS 19X30 SUE WHT

## (undated) DEVICE — Z DISCONTINUEDSOLUTION PREP 2OZ 10% POVIDONE IOD SCR CAP BTL

## (undated) DEVICE — HANDLE LT SNAP ON ULT DURABLE LENS FOR TRUMPF ALC DISPOSABLE

## (undated) DEVICE — NEEDLE HYPO 25GA L1.5IN BVL ORIENTED ECLIPSE

## (undated) DEVICE — GAUZE SPONGES,12 PLY: Brand: CURITY

## (undated) DEVICE — SYRINGE MED 3ML NDL 22GA L1 1/2IN REG BVL SFGLDE

## (undated) DEVICE — CANNULA CUSH AD W/ 14FT TBG

## (undated) DEVICE — SUTURE VCRL SZ 4-0 L27IN ABSRB UD L19MM FS-2 3/8 CIR REV J422H

## (undated) DEVICE — ZIMMER® STERILE DISPOSABLE TOURNIQUET CUFF WITH PROTECTIVE SLEEVE AND PLC, DUAL PORT, SINGLE BLADDER, 18 IN. (46 CM)

## (undated) DEVICE — SOLUTION IV 1000ML 0.9% SOD CHL

## (undated) DEVICE — STRAP,POSITIONING,KNEE/BODY,FOAM,4X60": Brand: MEDLINE

## (undated) DEVICE — PENCIL SMK EVAC 10 FT BLADE ELECTRD ROCKER FOR TELSCP

## (undated) DEVICE — TRAY PREP DRY W/ PREM GLV 2 APPL 6 SPNG 2 UNDPD 1 OVERWRAP

## (undated) DEVICE — GARMENT,MEDLINE,DVT,INT,CALF,MED, GEN2: Brand: MEDLINE

## (undated) DEVICE — KERLIX BANDAGE ROLL: Brand: KERLIX

## (undated) DEVICE — LIGHT HANDLE: Brand: DEVON

## (undated) DEVICE — DBD-PACK,LAPAROTOMY,2 REINFORCED GOWNS: Brand: MEDLINE

## (undated) DEVICE — BANDAGE COMPR 9 FTX4 IN SMOOTH COMFORTABLE SYNTH ESMRK LF

## (undated) DEVICE — SMALL TEAR CROSS CUT RASP (11.0 X 5.0MM)

## (undated) DEVICE — SHEAR RMFG HARMONIC FOCUS 9CM -- OEM ITEM L#322125

## (undated) DEVICE — POSITIONER HD REST FOAM CMFRT TCH

## (undated) DEVICE — STERILE POLYISOPRENE POWDER-FREE SURGICAL GLOVES: Brand: PROTEXIS

## (undated) DEVICE — NEEDLE HYPO 18GA L1.5IN PNK S STL HUB POLYPR SHLD REG BVL

## (undated) DEVICE — TOWEL SURG W17XL27IN STD BLU COT NONFENESTRATED PREWASHED

## (undated) DEVICE — SUTURE MCRYL SZ 5-0 L18IN ABSRB UD PC-3 L16MM 3/8 CIR Y844G

## (undated) DEVICE — SPONGE GZ W4XL4IN COT 12 PLY TYP VII WVN C FLD DSGN

## (undated) DEVICE — SYR IRR BLB 2OZ DISP BLU STRL -- CONVERT TO ITEM 357637

## (undated) DEVICE — STRETCH BANDAGE ROLL: Brand: DERMACEA

## (undated) DEVICE — MASTISOL ADHESIVE LIQ 2/3ML

## (undated) DEVICE — ELECTRODE,RADIOTRANSLUCENT,FOAM,5PK: Brand: MEDLINE

## (undated) DEVICE — SYR 10ML LUER LOK 1/5ML GRAD --

## (undated) DEVICE — TAPE, MEDFIX EZ, SELF WOUND, 4"X2YD: Brand: MEDLINE

## (undated) DEVICE — DRAPE,EXTREMITY,89X128,STERILE: Brand: MEDLINE

## (undated) DEVICE — SUT CHRMC 3-0 27IN SH BRN --

## (undated) DEVICE — CURITY NON-ADHERENT STRIPS: Brand: CURITY

## (undated) DEVICE — PAD,ABDOMINAL,5"X9",ST,LF,25/BX: Brand: MEDLINE INDUSTRIES, INC.

## (undated) DEVICE — STERILE POLYISOPRENE POWDER-FREE SURGICAL GLOVES WITH EMOLLIENT COATING: Brand: PROTEXIS

## (undated) DEVICE — STOCKINETTE TUBE BLN 2PLY 6X72 -- MEDICHOICE CONVERT TO 363488

## (undated) DEVICE — PRECISION THIN (9.0 X 0.38 X 31.0MM)

## (undated) DEVICE — KIT,1200CC CANISTER,3/16"X6' TUBING: Brand: MEDLINE INDUSTRIES, INC.

## (undated) DEVICE — SURGICAL PROCEDURE PACK BASIN MAJ SET CUST NO CAUT

## (undated) DEVICE — (D)PREP SKN CHLRAPRP APPL 26ML -- CONVERT TO ITEM 371833